# Patient Record
Sex: FEMALE | Race: WHITE | NOT HISPANIC OR LATINO | Employment: FULL TIME | URBAN - METROPOLITAN AREA
[De-identification: names, ages, dates, MRNs, and addresses within clinical notes are randomized per-mention and may not be internally consistent; named-entity substitution may affect disease eponyms.]

---

## 2017-01-17 ENCOUNTER — PRENATAL OFFICE VISIT (OUTPATIENT)
Dept: MIDWIFE SERVICES | Facility: CLINIC | Age: 37
End: 2017-01-17
Payer: COMMERCIAL

## 2017-01-17 VITALS
HEIGHT: 67 IN | BODY MASS INDEX: 31.12 KG/M2 | DIASTOLIC BLOOD PRESSURE: 74 MMHG | TEMPERATURE: 96.7 F | WEIGHT: 198.3 LBS | HEART RATE: 101 BPM | SYSTOLIC BLOOD PRESSURE: 122 MMHG

## 2017-01-17 DIAGNOSIS — O09.521 ELDERLY MULTIGRAVIDA IN FIRST TRIMESTER: Primary | ICD-10-CM

## 2017-01-17 LAB
GLUCOSE 1H P 50 G GLC PO SERPL-MCNC: 140 MG/DL (ref 60–129)
HGB BLD-MCNC: 12.1 G/DL (ref 11.7–15.7)

## 2017-01-17 PROCEDURE — 82950 GLUCOSE TEST: CPT | Performed by: ADVANCED PRACTICE MIDWIFE

## 2017-01-17 PROCEDURE — 99207 ZZC PRENATAL VISIT: CPT | Performed by: ADVANCED PRACTICE MIDWIFE

## 2017-01-17 PROCEDURE — 00000218 ZZHCL STATISTIC OBHBG - HEMOGLOBIN: Performed by: ADVANCED PRACTICE MIDWIFE

## 2017-01-17 PROCEDURE — 36415 COLL VENOUS BLD VENIPUNCTURE: CPT | Performed by: ADVANCED PRACTICE MIDWIFE

## 2017-01-17 PROCEDURE — 86780 TREPONEMA PALLIDUM: CPT | Performed by: ADVANCED PRACTICE MIDWIFE

## 2017-01-17 NOTE — NURSING NOTE
Syphilis is a sexually transmitted disease that can cause birth defects in the babies of untreated mothers. Every pregnant patient is tested for syphilis early in each pregnancy as part of the routine lab work. The [place] Department of [facility] has seen an increase in the rate of syphilis in [place]. The [facility] now recommends testing for syphilis 3 times during a pregnancy, the new prenatal visit, 28 weeks and when admitted for delivery. Patient accepts lab testing for syphilis.

## 2017-01-17 NOTE — PROGRESS NOTES
Feeling well, no concerns. Many questions answered today about CBE classes, TDAP, moles (will make appointment with FP clinic in [place]) and medication ok to take during pregnancy. Given handout on medication and 24 week handout with CBE options. Feeling baby move. No contractions, LOF or bleeding. GCT, hgb today. RTC in 4 weeks. MML

## 2017-01-18 LAB — T PALLIDUM IGG+IGM SER QL: NEGATIVE

## 2017-01-18 ASSESSMENT — PATIENT HEALTH QUESTIONNAIRE - PHQ9: SUM OF ALL RESPONSES TO PHQ QUESTIONS 1-9: 2

## 2017-01-20 DIAGNOSIS — O09.521 ELDERLY MULTIGRAVIDA IN FIRST TRIMESTER: Primary | ICD-10-CM

## 2017-01-20 PROCEDURE — 36415 COLL VENOUS BLD VENIPUNCTURE: CPT | Performed by: ADVANCED PRACTICE MIDWIFE

## 2017-01-20 PROCEDURE — 82952 GTT-ADDED SAMPLES: CPT | Performed by: ADVANCED PRACTICE MIDWIFE

## 2017-01-20 PROCEDURE — 82951 GLUCOSE TOLERANCE TEST (GTT): CPT | Performed by: ADVANCED PRACTICE MIDWIFE

## 2017-01-21 LAB
GLUCOSE 1H P 100 G GLC PO SERPL-MCNC: 151 MG/DL (ref 60–179)
GLUCOSE 2H P 100 G GLC PO SERPL-MCNC: 130 MG/DL (ref 60–154)
GLUCOSE 3H P 100 G GLC PO SERPL-MCNC: 80 MG/DL (ref 60–139)
GLUCOSE P FAST SERPL-MCNC: 72 MG/DL (ref 60–94)

## 2017-02-03 ENCOUNTER — OFFICE VISIT (OUTPATIENT)
Dept: FAMILY MEDICINE | Facility: CLINIC | Age: 37
End: 2017-02-03
Payer: COMMERCIAL

## 2017-02-03 DIAGNOSIS — D22.9 MULTIPLE BENIGN MELANOCYTIC NEVI: ICD-10-CM

## 2017-02-03 DIAGNOSIS — Z80.8 FAMILY HISTORY OF MALIGNANT MELANOMA OF SKIN: ICD-10-CM

## 2017-02-03 DIAGNOSIS — L91.8 SKIN TAG: ICD-10-CM

## 2017-02-03 DIAGNOSIS — Z80.8 FAMILY HISTORY OF BASAL CELL CARCINOMA: ICD-10-CM

## 2017-02-03 DIAGNOSIS — D22.5 COMPOUND NEVUS OF CHEST: Primary | ICD-10-CM

## 2017-02-03 PROCEDURE — 99213 OFFICE O/P EST LOW 20 MIN: CPT | Performed by: FAMILY MEDICINE

## 2017-02-03 NOTE — PROGRESS NOTES
"[facility] - PRIMARY CARE SKIN    CC : Lesion(s)  SUBJECTIVE:                                                    Sakshi Parish is a 36 year old female who presents to clinic today because of mole changes on the trunk. Moles have changed during pregnancy, currently 26 weeks pregnant. Pregnancy has been otherwise uneventful. No bleeding of moles. Slight itchiness of one mole, but she reports itchiness whenever a new mole develops.    Mole on right chest had a \"black, charcoal-stephen, burnt spot\", but the spot has now fallen off.    Mole on right lower chest has developed a raised knob from a flat lesion previously.    Personal history of skin cancer : NO.  Family history of skin cancer : YES - melanoma in mother and basal cell carcinoma in maternal uncle.    Occupation : (indoor).    Refer to electronic medical record (EMR) for past medical history and medications.    INTEGUMENTARY/SKIN: POSITIVE for mole changes  ROS : 14 point review of systems was negative except the symptoms listed above in the HPI.    This document serves as a record of the services and decisions personally performed and made by Jessica Meeks MD. It was created on her behalf by Richy Gillespie, a trained medical scribe.  The creation of this document is based on the scribe's personal observations and the provider's statements to the medical scribe.  Richy Gillespie, February 3, 2017 3:17 PM      OBJECTIVE:                                                    GENERAL: healthy, alert and no distress  SKIN: Mendez Skin Type - I.  Face, Neck and Trunk were examined. The dermatoscope was used to help evaluate pigmented lesions.  Skin Pertinent Findings:  Right lateral chest wall : 6 mm x 3 mm in size, raised, smooth, brown lesion consistent with benign compound nevus. Dermoscopy - no suspicious characteristics.    Right lower chest wall : 3 mm in size, raised, brown, benign-appearing skin tag.    Back : 5 mm benign brown macule on the left lateral " "back    Diagnostic Test Results:  none           ASSESSMENT:                                                      Encounter Diagnoses   Name Primary?     Compound nevus of chest Yes     Multiple benign melanocytic nevi      Skin tag      Family history of malignant melanoma of skin      Family history of basal cell carcinoma            PLAN:                                                    Patient Instructions   FUTURE APPOINTMENTS  Follow up for an annual full-body skin cancer screening.    TIPS FOR AVOIDING SKIN CANCER AND PREMATURE SKIN AGING  DOs    Wear a wide-brimmed hat and sunglasses.     Wear sun-protective clothing.    [employer] and other [employer] make sun protective clothing that is stylish, comfortable and cool.    [employer] and other [employer] make UV arm sleeves suitable for golfing, gardening and other activities.      Wear sunscreen on your face every day, even in the winter. (UVA \"aging rays\" penetrates window glass and is just as strong in the winter as in the summer) Sunscreen with SPF > 30 is recommended.    Wear sunscreen on your body and re-apply every 2 hours when exposed to sun. Sunscreen with SPF > 50 is recommended.    You should use about 3 tablespoons of sunscreen to protect your whole body. Thus a typical eight ounce bottle of sunscreen should last 4 applications. Remember, that the SPF rating is compromised if you don t apply enough. Most people only apply 1/2 - 1/3 of the amount they need. Also don t forget areas such as your ears, feet, upper back and harder to reach places. Keep in mind that these amounts should be increased for larger body sizes.    Note that spray sunscreens are only for touch-up application, not as a base layer. Also, use spray sunscreens with caution around small children due to inhalation risk.    Product Recommendations:    Look for broad spectrum sunscreen (blocks both UVA and UVB).    Look for titanium dioxide and/or zinc oxide in the active " "ingredients, which are physical blockers as opposed to chemical blockers. Chemical-free sunscreens should not irritate the skin.    Good examples include: Blue Lizard, EltaMD, Vanicream, Solbar, CeraVe.     For sensitive skin, consider : SkinMedica Essential Defense Mineral Shield Broad Spectrum SPF 35  (You can also find these on amazon.com)      Avoid combination products that include both sunscreen and insect repellant, as sunscreen should be applied every 2 hours, but insect repellant should not be applied as frequently.    Avoid products that include oxybenzone or retinyl palmitate.    For more information:  http://www.skincancer.org/prevention/sun-protection/sunscreen/sunscreens-safe-and-effective    DON'Ts    All tanning damages the skin. Aim for ivory skin year round and you will have less trouble with your skin in years to come. There is no merit in getting \"a base tan\" before a warm weather vacation, as any tanning indicates your body's response to sun damage.    Never use tanning beds. Tanning beds are associated with much higher risks of skin cancer.    Avoid mid-day sunshine (10 AM to 3 PM), if possible.    Stop smoking. Smokers have higher rates of skin cancer and also have premature skin wrinkling.          PROCEDURES:                                                    None.    TT : 20 minutes.  CT : 15 minutes.      The information in this document, created by the medical scribe for me, accurately reflects the services I personally performed and the decisions made by me. I have reviewed and approved this document for accuracy prior to leaving the patient care area.  Jessica Meeks MD February 3, 2017 3:17 PM  [facility] - PRIMARY CARE SKIN  "

## 2017-02-03 NOTE — MR AVS SNAPSHOT
"              After Visit Summary   2/3/2017    Sakshi Parish    MRN: [id number]           Patient Information     Date Of Birth          1980        Visit Information        Provider Department      2/3/2017 3:20 PM Ledy Meeks MD [facility] - Primary Care Skin        Today's Diagnoses     Compound nevus of chest    -  1     Multiple benign melanocytic nevi         Skin tag         Family history of malignant melanoma of skin         Family history of basal cell carcinoma           Care Instructions    FUTURE APPOINTMENTS  Follow up for an annual full-body skin cancer screening.    TIPS FOR AVOIDING SKIN CANCER AND PREMATURE SKIN AGING  DOs    Wear a wide-brimmed hat and sunglasses.     Wear sun-protective clothing.    "[employer]" and other companies make sun protective clothing that is stylish, comfortable and cool.    [employer] and other companies make UV arm sleeves suitable for golfing, gardening and other activities.      Wear sunscreen on your face every day, even in the winter. (UVA \"aging rays\" penetrates window glass and is just as strong in the winter as in the summer) Sunscreen with SPF > 30 is recommended.    Wear sunscreen on your body and re-apply every 2 hours when exposed to sun. Sunscreen with SPF > 50 is recommended.    You should use about 3 tablespoons of sunscreen to protect your whole body. Thus a typical eight ounce bottle of sunscreen should last 4 applications. Remember, that the SPF rating is compromised if you don t apply enough. Most people only apply 1/2 - 1/3 of the amount they need. Also don t forget areas such as your ears, feet, upper back and harder to reach places. Keep in mind that these amounts should be increased for larger body sizes.    Note that spray sunscreens are only for touch-up application, not as a base layer. Also, use spray sunscreens with caution around small children due to inhalation risk.    Product " "Recommendations:    Look for broad spectrum sunscreen (blocks both UVA and UVB).    Look for titanium dioxide and/or zinc oxide in the active ingredients, which are physical blockers as opposed to chemical blockers. Chemical-free sunscreens should not irritate the skin.    Good examples include: Blue Lizard, EltaMD, Vanicream, Solbar, CeraVe.     For sensitive skin, consider : SkinMedica Essential Defense Mineral Shield Broad Spectrum SPF 35  (You can also find these on amazon.com)      Avoid combination products that include both sunscreen and insect repellant, as sunscreen should be applied every 2 hours, but insect repellant should not be applied as frequently.    Avoid products that include oxybenzone or retinyl palmitate.    For more information:  http://www.skincancer.org/prevention/sun-protection/sunscreen/sunscreens-safe-and-effective    DON'Ts    All tanning damages the skin. Aim for ivory skin year round and you will have less trouble with your skin in years to come. There is no merit in getting \"a base tan\" before a warm weather vacation, as any tanning indicates your body's response to sun damage.    Never use tanning beds. Tanning beds are associated with much higher risks of skin cancer.    Avoid mid-day sunshine (10 AM to 3 PM), if possible.    Stop smoking. Smokers have higher rates of skin cancer and also have premature skin wrinkling.        Follow-ups after your visit        Your next 10 appointments already scheduled     Feb 23, 2017  7:30 AM   Esteban Rucker with YUMIKO Davalos CNM   [facility] ([facility]    [address] [place] [id number] [phone]              Who to contact     If you have questions or need follow up information about today's clinic visit or your schedule please contact [facility] - PRIMARY CARE SKIN directly at [phone].  Normal or non-critical lab and imaging results will be communicated " to you by Zifyhart, letter or phone within 4 business days after the clinic has received the results. If you do not hear from us within 7 days, please contact the clinic through LapSpace or phone. If you have a critical or abnormal lab result, we will notify you by phone as soon as possible.  Submit refill requests through LapSpace or call your pharmacy and they will forward the refill request to us. Please allow 3 business days for your refill to be completed.          Additional Information About Your Visit        [facility] Information     LapSpace gives you secure access to your electronic health record. If you see a primary care provider, you can also send messages to your care team and make appointments. If you have questions, please call your primary care clinic.  If you do not have a primary care provider, please call [phone] and they will assist you.        Care EveryWhere ID     This is your Care EveryWhere ID. This could be used by other organizations to access your [place] medical records  [id number]        Your Vitals Were     Last Period                   07/28/2016 (Approximate)            Blood Pressure from Last 3 Encounters:   01/17/17 122/74   12/01/16 136/81   09/27/16 128/80    Weight from Last 3 Encounters:   01/17/17 198 lb 4.8 oz (89.948 kg)   12/01/16 193 lb (87.544 kg)   09/27/16 192 lb 1.6 oz (87.136 kg)              Today, you had the following     No orders found for display       Primary Care Provider Office Phone #    [place] [facility] [phone]       No address on file        Thank you!     Thank you for choosing [facility] PRIMARY CARE [facility]  for your care. Our goal is always to provide you with excellent care. Hearing back from our patients is one way we can continue to improve our services. Please take a few minutes to complete the written survey that you may receive in the mail after your visit with us. Thank you!             Your Updated Medication List - Protect  others around you: Learn how to safely use, store and throw away your medicines at www.disposemymeds.org.          This list is accurate as of: 2/3/17  3:27 PM.  Always use your most recent med list.                   Brand Name Dispense Instructions for use    fish oil-omega-3 fatty acids 1000 MG capsule      Take 2 g by mouth daily       fluticasone 50 MCG/ACT spray    FLONASE    1 Package    Spray 1-2 sprays into both nostrils daily       Multi-vitamin Tabs tablet      Take 1 tablet by mouth daily

## 2017-02-03 NOTE — PATIENT INSTRUCTIONS
"FUTURE APPOINTMENTS  Follow up for an annual full-body skin cancer screening.    TIPS FOR AVOIDING SKIN CANCER AND PREMATURE SKIN AGING  DOs    Wear a wide-brimmed hat and sunglasses.     Wear sun-protective clothing.    [employer] and other [employer] make sun protective clothing that is stylish, comfortable and cool.    [employer] and other [employer] make UV arm sleeves suitable for golfing, gardening and other activities.      Wear sunscreen on your face every day, even in the winter. (UVA \"aging rays\" penetrates window glass and is just as strong in the winter as in the summer) Sunscreen with SPF > 30 is recommended.    Wear sunscreen on your body and re-apply every 2 hours when exposed to sun. Sunscreen with SPF > 50 is recommended.    You should use about 3 tablespoons of sunscreen to protect your whole body. Thus a typical eight ounce bottle of sunscreen should last 4 applications. Remember, that the SPF rating is compromised if you don t apply enough. Most people only apply 1/2 - 1/3 of the amount they need. Also don t forget areas such as your ears, feet, upper back and harder to reach places. Keep in mind that these amounts should be increased for larger body sizes.    Note that spray sunscreens are only for touch-up application, not as a base layer. Also, use spray sunscreens with caution around small children due to inhalation risk.    Product Recommendations:    Look for broad spectrum sunscreen (blocks both UVA and UVB).    Look for titanium dioxide and/or zinc oxide in the active ingredients, which are physical blockers as opposed to chemical blockers. Chemical-free sunscreens should not irritate the skin.    Good examples include: Blue Lizard, EltaMD, Vanicream, Solbar, CeraVe.     For sensitive skin, consider : SkinMedica Essential Defense Mineral Shield Broad Spectrum SPF 35  (You can also find these on amazon.com)      Avoid combination products that include both sunscreen and insect " "repellant, as sunscreen should be applied every 2 hours, but insect repellant should not be applied as frequently.    Avoid products that include oxybenzone or retinyl palmitate.    For more information:  http://www.skincancer.org/prevention/sun-protection/sunscreen/sunscreens-safe-and-effective    DON'Ts    All tanning damages the skin. Aim for ivory skin year round and you will have less trouble with your skin in years to come. There is no merit in getting \"a base tan\" before a warm weather vacation, as any tanning indicates your body's response to sun damage.    Never use tanning beds. Tanning beds are associated with much higher risks of skin cancer.    Avoid mid-day sunshine (10 AM to 3 PM), if possible.    Stop smoking. Smokers have higher rates of skin cancer and also have premature skin wrinkling.  "

## 2017-02-23 ENCOUNTER — PRENATAL OFFICE VISIT (OUTPATIENT)
Dept: MIDWIFE SERVICES | Facility: CLINIC | Age: 37
End: 2017-02-23
Payer: COMMERCIAL

## 2017-02-23 VITALS
SYSTOLIC BLOOD PRESSURE: 102 MMHG | WEIGHT: 198.9 LBS | HEART RATE: 91 BPM | BODY MASS INDEX: 31.15 KG/M2 | TEMPERATURE: 97.1 F | OXYGEN SATURATION: 97 % | DIASTOLIC BLOOD PRESSURE: 60 MMHG

## 2017-02-23 DIAGNOSIS — Z23 NEED FOR TDAP VACCINATION: ICD-10-CM

## 2017-02-23 DIAGNOSIS — O99.891 BACK PAIN IN PREGNANCY: ICD-10-CM

## 2017-02-23 DIAGNOSIS — O09.521 ELDERLY MULTIGRAVIDA IN FIRST TRIMESTER: Primary | ICD-10-CM

## 2017-02-23 DIAGNOSIS — M54.9 BACK PAIN IN PREGNANCY: ICD-10-CM

## 2017-02-23 PROCEDURE — 90471 IMMUNIZATION ADMIN: CPT | Performed by: ADVANCED PRACTICE MIDWIFE

## 2017-02-23 PROCEDURE — 99207 ZZC PRENATAL VISIT: CPT | Performed by: ADVANCED PRACTICE MIDWIFE

## 2017-02-23 PROCEDURE — 90715 TDAP VACCINE 7 YRS/> IM: CPT | Performed by: ADVANCED PRACTICE MIDWIFE

## 2017-02-23 NOTE — PROGRESS NOTES
Sakshi is a 36 year old  @ 30+0 who presents for a scheduled  appointment.  States that she is feeling well.  Good fetal movement.  Denies vaginal leaking, bleeding, or discharge.  No uterine contractions.  FHR 130s.  FH 30cm.  She is feeling some low/mid back pain that she characterizes as muscular.  Discussed comfort measures and provided rx for a maternity support belt.  Is excited about her pregnancy and has necessary baby care items.  Planning on breastfeeding.  TDAP given today.  Will return to clinic in two weeks for next appointment or sooner if needed.     SHANICE García, RN, SNM

## 2017-02-23 NOTE — PROGRESS NOTES
Pt here with . Feeling well today - has noticed increasing low/mid back pain. Discussed etiology and comfort measures, rx given for support belt. Baby is active. No contractions, LOF or bleeding. TDAP today. RTC in 2 weeks. MML

## 2017-02-23 NOTE — MR AVS SNAPSHOT
After Visit Summary   2/23/2017    Sakshi Parish    MRN: [id number]           Patient Information     Date Of Birth          1980        Visit Information        Provider Department      2/23/2017 7:30 AM Neris Prado APRN CNM [facility]        Today's Diagnoses     Elderly multigravida in first trimester    -  1    Need for Tdap vaccination        Back pain in pregnancy           Follow-ups after your visit        Your next 10 appointments already scheduled     Mar 10, 2017  3:30 PM CST   Esteban OB Short with YUMIKO Gill CNM   [facility] ([facility])    [address] [place] [id number] [phone]              Who to contact     If you have questions or need follow up information about today's clinic visit or your schedule please contact [facility] directly at [phone].  Normal or non-critical lab and imaging results will be communicated to you by PeerApphart, letter or phone within 4 business days after the clinic has received the results. If you do not hear from us within 7 days, please contact the clinic through PeerApphart or phone. If you have a critical or abnormal lab result, we will notify you by phone as soon as possible.  Submit refill requests through Zooomr or call your pharmacy and they will forward the refill request to us. Please allow 3 business days for your refill to be completed.          Additional Information About Your Visit        MyChart Information     Zooomr gives you secure access to your electronic health record. If you see a primary care provider, you can also send messages to your care team and make appointments. If you have questions, please call your primary care clinic.  If you do not have a primary care provider, please call [phone] and they will assist you.        Care EveryWhere ID     This is your Care EveryWhere ID. This could be used by  other organizations to access your [place] medical records  [id number]        Your Vitals Were     Pulse Temperature Last Period Pulse Oximetry BMI (Body Mass Index)       91 97.1  F (36.2  C) (Oral) 07/28/2016 (Approximate) 97% 31.15 kg/m2        Blood Pressure from Last 3 Encounters:   02/23/17 102/60   01/17/17 122/74   12/01/16 136/81    Weight from Last 3 Encounters:   02/23/17 198 lb 14.4 oz (90.2 kg)   01/17/17 198 lb 4.8 oz (89.9 kg)   12/01/16 193 lb (87.5 kg)              We Performed the Following     ADMIN 1st VACCINE     TDAP (ADACEL AGES 11-64)          Today's Medication Changes          These changes are accurate as of: 2/23/17  9:53 AM.  If you have any questions, ask your nurse or doctor.               Start taking these medicines.        Dose/Directions    order for DME   Used for:  Back pain in pregnancy   Started by:  Neris Prado APRN CNM        Equipment being ordered: Maternity support belt   Quantity:  1 Device   Refills:  0            Where to get your medicines      Some of these will need a paper prescription and others can be bought over the counter.  Ask your nurse if you have questions.     Bring a paper prescription for each of these medications     order for DME                Primary Care Provider Office Phone #    [facility] [phone]       No address on file        Thank you!     Thank you for choosing [facility]  for your care. Our goal is always to provide you with excellent care. Hearing back from our patients is one way we can continue to improve our services. Please take a few minutes to complete the written survey that you may receive in the mail after your visit with us. Thank you!             Your Updated Medication List - Protect others around you: Learn how to safely use, store and throw away your medicines at www.disposemymeds.org.          This list is accurate as of: 2/23/17  9:53 AM.  Always use your most recent med list.                    Brand Name Dispense Instructions for use    fish oil-omega-3 fatty acids 1000 MG capsule      Take 2 g by mouth daily       fluticasone 50 MCG/ACT spray    FLONASE    1 Package    Spray 1-2 sprays into both nostrils daily       Multi-vitamin Tabs tablet      Take 1 tablet by mouth daily       order for DME     1 Device    Equipment being ordered: Maternity support belt

## 2017-03-06 ENCOUNTER — TELEPHONE (OUTPATIENT)
Dept: FAMILY MEDICINE | Facility: CLINIC | Age: 37
End: 2017-03-06

## 2017-03-06 NOTE — TELEPHONE ENCOUNTER
Summary:    Patient is due/failing the following:   PAP    Type of outreach:  Sent letter.  Action needed: Patient needs office visit for pap smear.    If need for provider review:    Please indicate OV, lab, MTM, or nurse appt if needed.  Indicate fasting or not fasting.                                                                                                                                          Starla Allan MA  [facility]

## 2017-03-10 ENCOUNTER — PRENATAL OFFICE VISIT (OUTPATIENT)
Dept: MIDWIFE SERVICES | Facility: CLINIC | Age: 37
End: 2017-03-10
Payer: COMMERCIAL

## 2017-03-10 VITALS
TEMPERATURE: 97.6 F | HEART RATE: 87 BPM | DIASTOLIC BLOOD PRESSURE: 75 MMHG | SYSTOLIC BLOOD PRESSURE: 123 MMHG | OXYGEN SATURATION: 97 % | WEIGHT: 200.8 LBS | HEIGHT: 67 IN | BODY MASS INDEX: 31.52 KG/M2

## 2017-03-10 DIAGNOSIS — G44.219 EPISODIC TENSION-TYPE HEADACHE, NOT INTRACTABLE: Primary | ICD-10-CM

## 2017-03-10 PROCEDURE — 99207 ZZC PRENATAL VISIT: CPT | Performed by: ADVANCED PRACTICE MIDWIFE

## 2017-03-10 RX ORDER — CYCLOBENZAPRINE HCL 10 MG
10 TABLET ORAL 3 TIMES DAILY PRN
Qty: 30 TABLET | Refills: 1 | Status: SHIPPED | OUTPATIENT
Start: 2017-03-10 | End: 2017-03-10

## 2017-03-10 NOTE — MR AVS SNAPSHOT
"              After Visit Summary   3/10/2017    Sakshi Parish    MRN: [id number]           Patient Information     Date Of Birth          1980        Visit Information        Provider Department      3/10/2017 3:30 PM Capri Vazquez APRN CNM [facility] [place]        Today's Diagnoses     Episodic tension-type headache, not intractable    -  1       Follow-ups after your visit        Who to contact     If you have questions or need follow up information about today's clinic visit or your schedule please contact [facility] directly at [phone].  Normal or non-critical lab and imaging results will be communicated to you by iTwixiehart, letter or phone within 4 business days after the clinic has received the results. If you do not hear from us within 7 days, please contact the clinic through EUDOWEBt or phone. If you have a critical or abnormal lab result, we will notify you by phone as soon as possible.  Submit refill requests through [employer] or call your pharmacy and they will forward the refill request to us. Please allow 3 business days for your refill to be completed.          Additional Information About Your Visit        MyChart Information     [employer] gives you secure access to your electronic health record. If you see a primary care provider, you can also send messages to your care team and make appointments. If you have questions, please call your primary care clinic.  If you do not have a primary care provider, please call [phone] and they will assist you.        Care EveryWhere ID     This is your Care EveryWhere ID. This could be used by other organizations to access your [place] medical records  [id number]        Your Vitals Were     Pulse Temperature Height Last Period Pulse Oximetry BMI (Body Mass Index)    87 97.6  F (36.4  C) (Oral) 5' 7\" (1.702 m) 07/28/2016 (Approximate) 97% 31.45 kg/m2       Blood Pressure from Last 3 Encounters:   03/10/17 123/75 "   02/23/17 102/60   01/17/17 122/74    Weight from Last 3 Encounters:   03/10/17 200 lb 12.8 oz (91.1 kg)   02/23/17 198 lb 14.4 oz (90.2 kg)   01/17/17 198 lb 4.8 oz (89.9 kg)              Today, you had the following     No orders found for display         Today's Medication Changes          These changes are accurate as of: 3/10/17  3:53 PM.  If you have any questions, ask your nurse or doctor.               Stop taking these medicines if you haven't already. Please contact your care team if you have questions.     Multi-vitamin Tabs tablet   Stopped by:  Capri Vazquez APRN CNM                    Primary Care Provider Office Phone #    [facility] [phone]       No address on file        Thank you!     Thank you for [place] [facility] [place]. Our goal is always to provide you with excellent care. Hearing back from our patients is one way we can continue to improve our services. Please take a few minutes to complete the written survey that you may receive in the mail after your visit with us. Thank you!             Your Updated Medication List - Protect others around you: Learn how to safely use, store and throw away your medicines at www.disposemymeds.org.          This list is accurate as of: 3/10/17  3:53 PM.  Always use your most recent med list.                   Brand Name Dispense Instructions for use    fish oil-omega-3 fatty acids 1000 MG capsule      Take 2 g by mouth daily       fluticasone 50 MCG/ACT spray    FLONASE    1 Package    Spray 1-2 sprays into both nostrils daily       order for DME     1 Device    Equipment being ordered: Maternity support belt       PRENATAL VITAMIN PO

## 2017-03-10 NOTE — PROGRESS NOTES
Feeling well, no c/o.  Has not gotten pregnancy support belt but plans to this week, still has some back pain.  TUMS seems to be managing her heartburn.  RTC 2 wks JR

## 2017-03-31 ENCOUNTER — PRENATAL OFFICE VISIT (OUTPATIENT)
Dept: MIDWIFE SERVICES | Facility: CLINIC | Age: 37
End: 2017-03-31
Payer: COMMERCIAL

## 2017-03-31 VITALS
HEART RATE: 102 BPM | TEMPERATURE: 97.6 F | DIASTOLIC BLOOD PRESSURE: 78 MMHG | WEIGHT: 197 LBS | SYSTOLIC BLOOD PRESSURE: 121 MMHG | BODY MASS INDEX: 30.85 KG/M2

## 2017-03-31 DIAGNOSIS — Z34.93 PRENATAL CARE IN THIRD TRIMESTER: Primary | ICD-10-CM

## 2017-03-31 PROCEDURE — 99207 ZZC PRENATAL VISIT: CPT | Performed by: ADVANCED PRACTICE MIDWIFE

## 2017-03-31 NOTE — PROGRESS NOTES
Feeling well.  Baby is active. Denies any leaking of fluid, vaginal bleeding, regular uterine contractions, or headaches or other concerns.  Discussed birth control - they will think about it.  They are not sure what they want.  Plan on breastfeeding.  Discussed support at the hospital.  Discussed GBS at next visit.    Reviewed to call [phone] for contractions, loss of fluid, vaginal bleeding, decreased fetal movement or any other questions or concerns.    RTC in 1 weeks.  Nikky Pereira, JEFF, APRN, CNM

## 2017-03-31 NOTE — MR AVS SNAPSHOT
After Visit Summary   3/31/2017    Sakshi Parish    MRN: [id number]           Patient Information     Date Of Birth          1980        Visit Information        Provider Department      3/31/2017 1:00 PM Nikky Pereira CNM [facility]        Today's Diagnoses     Prenatal care in third trimester    -  1       Follow-ups after your visit        Follow-up notes from your care team     Return in about 1 week (around 4/7/2017) for Prenatal care with GARY.      Who to contact     If you have questions or need follow up information about today's clinic visit or your schedule please contact [facility] directly at [phone].  Normal or non-critical lab and imaging results will be communicated to you by MyChart, letter or phone within 4 business days after the clinic has received the results. If you do not hear from us within 7 days, please contact the clinic through [employer] or phone. If you have a critical or abnormal lab result, we will notify you by phone as soon as possible.  Submit refill requests through [employer] or call your pharmacy and they will forward the refill request to us. Please allow 3 business days for your refill to be completed.          Additional Information About Your Visit        MyChart Information     [employer] gives you secure access to your electronic health record. If you see a primary care provider, you can also send messages to your care team and make appointments. If you have questions, please call your primary care clinic.  If you do not have a primary care provider, please call [phone] and they will assist you.        Care EveryWhere ID     This is your Care EveryWhere ID. This could be used by other organizations to access your [place] medical records  [id number]        Your Vitals Were     Pulse Temperature Last Period BMI (Body Mass Index)          102 97.6  F (36.4  C) (Oral) 07/28/2016 (Approximate) 30.85 kg/m2          Blood Pressure from Last 3 Encounters:   03/31/17 121/78   03/10/17 123/75   02/23/17 102/60    Weight from Last 3 Encounters:   03/31/17 197 lb (89.4 kg)   03/10/17 200 lb 12.8 oz (91.1 kg)   02/23/17 198 lb 14.4 oz (90.2 kg)              Today, you had the following     No orders found for display       Primary Care Provider Office Phone #    [facility] [phone]       No address on file        Thank you!     Thank you for choosing [facility]  for your care. Our goal is always to provide you with excellent care. Hearing back from our patients is one way we can continue to improve our services. Please take a few minutes to complete the written survey that you may receive in the mail after your visit with us. Thank you!             Your Updated Medication List - Protect others around you: Learn how to safely use, store and throw away your medicines at www.disposemymeds.org.          This list is accurate as of: 3/31/17  1:49 PM.  Always use your most recent med list.                   Brand Name Dispense Instructions for use    fish oil-omega-3 fatty acids 1000 MG capsule      Take 2 g by mouth daily       fluticasone 50 MCG/ACT spray    FLONASE    1 Package    Spray 1-2 sprays into both nostrils daily       order for DME     1 Device    Equipment being ordered: Maternity support belt       PRENATAL VITAMIN PO

## 2017-04-06 ENCOUNTER — PRENATAL OFFICE VISIT (OUTPATIENT)
Dept: MIDWIFE SERVICES | Facility: CLINIC | Age: 37
End: 2017-04-06
Payer: COMMERCIAL

## 2017-04-06 VITALS
HEIGHT: 67 IN | BODY MASS INDEX: 31.53 KG/M2 | HEART RATE: 120 BPM | DIASTOLIC BLOOD PRESSURE: 61 MMHG | WEIGHT: 200.9 LBS | SYSTOLIC BLOOD PRESSURE: 124 MMHG | TEMPERATURE: 98.2 F

## 2017-04-06 DIAGNOSIS — O09.521 ELDERLY MULTIGRAVIDA IN FIRST TRIMESTER: Primary | ICD-10-CM

## 2017-04-06 LAB — HGB BLD-MCNC: 13 G/DL (ref 11.7–15.7)

## 2017-04-06 PROCEDURE — 36416 COLLJ CAPILLARY BLOOD SPEC: CPT | Performed by: ADVANCED PRACTICE MIDWIFE

## 2017-04-06 PROCEDURE — 99207 ZZC PRENATAL VISIT: CPT | Performed by: ADVANCED PRACTICE MIDWIFE

## 2017-04-06 PROCEDURE — 86803 HEPATITIS C AB TEST: CPT | Performed by: ADVANCED PRACTICE MIDWIFE

## 2017-04-06 PROCEDURE — 87653 STREP B DNA AMP PROBE: CPT | Performed by: ADVANCED PRACTICE MIDWIFE

## 2017-04-06 PROCEDURE — 00000218 ZZHCL STATISTIC OBHBG - HEMOGLOBIN: Performed by: ADVANCED PRACTICE MIDWIFE

## 2017-04-06 NOTE — MR AVS SNAPSHOT
After Visit Summary   4/6/2017    Sakshi Parish    MRN: [id number]           Patient Information     Date Of Birth          1980        Visit Information        Provider Department      4/6/2017 3:30 PM Neris Prado APRN [place] [facility]        Today's Diagnoses     Elderly multigravida in first trimester    -  1       Follow-ups after your visit        Your next 10 appointments already scheduled     Apr 12, 2017  4:30 PM CDT   MyChart OB Short with YUMIKO Mckeon CNM   [facility] [place]    [address] [place] [id number] [phone]            Apr 21, 2017  3:30 PM CDT   MyChart OB Short with YUMIKO Young CNM   [facility] [place]    [address] [place] [id number] [phone]              Who to contact     If you have questions or need follow up information about today's clinic visit or your schedule please contact [facility] directly at [phone].  Normal or non-critical lab and imaging results will be communicated to you by MyChart, letter or phone within 4 business days after the clinic has received the results. If you do not hear from us within 7 days, please contact the clinic through MyChart or phone. If you have a critical or abnormal lab result, we will notify you by phone as soon as possible.  Submit refill requests through [employer] or call your pharmacy and they will forward the refill request to us. Please allow 3 business days for your refill to be completed.          Additional Information About Your Visit        MyChart Information     [employer] gives you secure access to your electronic health record. If you see a primary care provider, you can also send messages to your care team and make appointments. If you have questions, please call your primary care clinic.  If you do not  "have a primary care provider, please call [phone] and they will assist you.        Care EveryWhere ID     This is your Care EveryWhere ID. This could be used by other organizations to access your [place] medical records  [id number]        Your Vitals Were     Pulse Temperature Height Last Period BMI (Body Mass Index)       120 98.2  F (36.8  C) (Oral) 5' 7\" (1.702 m) 07/28/2016 (Approximate) 31.47 kg/m2        Blood Pressure from Last 3 Encounters:   04/06/17 124/61   03/31/17 121/78   03/10/17 123/75    Weight from Last 3 Encounters:   04/06/17 200 lb 14.4 oz (91.1 kg)   03/31/17 197 lb (89.4 kg)   03/10/17 200 lb 12.8 oz (91.1 kg)              We Performed the Following     Group B strep PCR     Hepatitis C antibody     OB hemoglobin        Primary Care Provider Office Phone #    [facility] [phone]       No address on file        Thank you!     Thank you for choosing [facility]  for your care. Our goal is always to provide you with excellent care. Hearing back from our patients is one way we can continue to improve our services. Please take a few minutes to complete the written survey that you may receive in the mail after your visit with us. Thank you!             Your Updated Medication List - Protect others around you: Learn how to safely use, store and throw away your medicines at www.disposemymeds.org.          This list is accurate as of: 4/6/17  4:22 PM.  Always use your most recent med list.                   Brand Name Dispense Instructions for use    fish oil-omega-3 fatty acids 1000 MG capsule      Take 2 g by mouth daily       fluticasone 50 MCG/ACT spray    FLONASE    1 Package    Spray 1-2 sprays into both nostrils daily       order for DME     1 Device    Equipment being ordered: Maternity support belt       PRENATAL VITAMIN PO            "

## 2017-04-06 NOTE — PROGRESS NOTES
Feeling well, no concerns. Baby is active. No regular contractions, LOF or bleeding. Would like water birth as an option - will draw Hep C today. Please sign agreement at next visit. Looking into peds clinics, planning to breastfeed. Reviewed progesterone only birth control options - patient will consider. GBS, hgb and Hep C today. RTC weekly, already scheduled. MML

## 2017-04-07 ASSESSMENT — PATIENT HEALTH QUESTIONNAIRE - PHQ9: SUM OF ALL RESPONSES TO PHQ QUESTIONS 1-9: 2

## 2017-04-08 LAB
GP B STREP DNA SPEC QL NAA+PROBE: NORMAL
SPECIMEN SOURCE: NORMAL

## 2017-04-09 LAB — HCV AB SERPL QL IA: NORMAL

## 2017-04-12 ENCOUNTER — PRENATAL OFFICE VISIT (OUTPATIENT)
Dept: MIDWIFE SERVICES | Facility: CLINIC | Age: 37
End: 2017-04-12
Payer: COMMERCIAL

## 2017-04-12 VITALS
BODY MASS INDEX: 31.73 KG/M2 | HEART RATE: 89 BPM | WEIGHT: 202.6 LBS | SYSTOLIC BLOOD PRESSURE: 130 MMHG | TEMPERATURE: 98.2 F | DIASTOLIC BLOOD PRESSURE: 78 MMHG

## 2017-04-12 DIAGNOSIS — O09.521 ELDERLY MULTIGRAVIDA IN FIRST TRIMESTER: ICD-10-CM

## 2017-04-12 PROCEDURE — 99207 ZZC PRENATAL VISIT: CPT | Performed by: ADVANCED PRACTICE MIDWIFE

## 2017-04-12 NOTE — MR AVS SNAPSHOT
After Visit Summary   4/12/2017    Sakshi Parish    MRN: [id number]           Patient Information     Date Of Birth          1980        Visit Information        Provider Department      4/12/2017 4:30 PM Colleen Dean APRN CNM [facility]        Today's Diagnoses     Elderly multigravida in first trimester           Follow-ups after your visit        Follow-up notes from your care team     Return in about 1 week (around 4/19/2017) for pv.      Your next 10 appointments already scheduled     Apr 21, 2017  3:30 PM CDT   MyCs[facility]at OB Short with YUMIKO Young CNM   [facility] [place]    [address] [place] [address] [address]  [facility] [id number] [phone]            Apr 27, 2017  4:00 PM CDT   MyChart OB Short with YUMIKO Davalos CNM   [facility] [place]    [address] [place] [address] [address]  [facility] [id number] [phone]              Who to contact     If you have questions or need follow up information about today's clinic visit or your schedule please contact [facility] [place] at [phone].  Normal or non-critical lab and imaging results will be communicated to you by MyChart, letter or phone within 4 business days after the clinic has received the results. If you do not hear from us within 7 days, please contact the clinic through MyChart or phone. If you have a critical or abnormal lab result, we will notify you by phone as soon as possible.  Submit refill requests through [employer] or call your pharmacy and they will forward the refill request to us. Please allow 3 business days for your refill to be completed.          Additional Information About Your Visit        MyChart Information     [employer] gives you secure access to your electronic health record. If you see a primary care provider, you can also send messages to your care team  and make appointments. If you have questions, please call your primary care clinic.  If you do not have a primary care provider, please call [phone] and they will assist you.        Care EveryWhere ID     This is your Care EveryWhere ID. This could be used by other organizations to access your [place] medical records  [id number]        Your Vitals Were     Pulse Temperature Last Period BMI (Body Mass Index)          89 98.2  F (36.8  C) (Oral) 07/28/2016 (Approximate) 31.73 kg/m2         Blood Pressure from Last 3 Encounters:   04/12/17 130/78   04/06/17 124/61   03/31/17 121/78    Weight from Last 3 Encounters:   04/12/17 202 lb 9.6 oz (91.9 kg)   04/06/17 200 lb 14.4 oz (91.1 kg)   03/31/17 197 lb (89.4 kg)              Today, you had the following     No orders found for display       Primary Care Provider Office Phone #    [facility] [phone]       No address on file        Thank you!     Thank you for [place] [facility]  [place]. Our goal is always to provide you with excellent care. Hearing back from our patients is one way we can continue to improve our services. Please take a few minutes to complete the written survey that you may receive in the mail after your visit with us. Thank you!             Your Updated Medication List - Protect others around you: Learn how to safely use, store and throw away your medicines at www.disposemymeds.org.          This list is accurate as of: 4/12/17  4:32 PM.  Always use your most recent med list.                   Brand Name Dispense Instructions for use    fish oil-omega-3 fatty acids 1000 MG capsule      Take 2 g by mouth daily       fluticasone 50 MCG/ACT spray    FLONASE    1 Package    Spray 1-2 sprays into both nostrils daily       order for DME     1 Device    Equipment being ordered: Maternity support belt       PRENATAL VITAMIN PO

## 2017-04-12 NOTE — PROGRESS NOTES
36w6d  Reviewed labs GBS negative, hep C negative.  Pt did not get Waterbirth consent for review so will give now and PLEASE SIGN NEXT VISIT. Labor signs reviewed knows when to call. rtc in 1 week jeremiah

## 2017-04-16 ENCOUNTER — TELEPHONE (OUTPATIENT)
Dept: OBGYN | Facility: CLINIC | Age: 37
End: 2017-04-16

## 2017-04-16 ENCOUNTER — TELEPHONE (OUTPATIENT)
Dept: NURSING | Facility: CLINIC | Age: 37
End: 2017-04-16

## 2017-04-17 ENCOUNTER — HOSPITAL ENCOUNTER (INPATIENT)
Facility: CLINIC | Age: 37
LOS: 2 days | Discharge: HOME HEALTH | End: 2017-04-19
Attending: ADVANCED PRACTICE MIDWIFE | Admitting: ADVANCED PRACTICE MIDWIFE
Payer: COMMERCIAL

## 2017-04-17 PROBLEM — Z36.89 ENCOUNTER FOR TRIAGE IN PREGNANT PATIENT: Status: ACTIVE | Noted: 2017-04-17

## 2017-04-17 LAB
A1 MICROGLOB PLACENTAL VAG QL: POSITIVE
ABO + RH BLD: NORMAL
ABO + RH BLD: NORMAL
BASOPHILS # BLD AUTO: 0 10E9/L (ref 0–0.2)
BASOPHILS NFR BLD AUTO: 0.1 %
DIFFERENTIAL METHOD BLD: ABNORMAL
EOSINOPHIL # BLD AUTO: 0 10E9/L (ref 0–0.7)
EOSINOPHIL NFR BLD AUTO: 0.2 %
ERYTHROCYTE [DISTWIDTH] IN BLOOD BY AUTOMATED COUNT: 13.3 % (ref 10–15)
HCT VFR BLD AUTO: 40.9 % (ref 35–47)
HGB BLD-MCNC: 13.7 G/DL (ref 11.7–15.7)
IMM GRANULOCYTES # BLD: 0 10E9/L (ref 0–0.4)
IMM GRANULOCYTES NFR BLD: 0.3 %
LYMPHOCYTES # BLD AUTO: 1.8 10E9/L (ref 0.8–5.3)
LYMPHOCYTES NFR BLD AUTO: 15.8 %
MCH RBC QN AUTO: 30 PG (ref 26.5–33)
MCHC RBC AUTO-ENTMCNC: 33.5 G/DL (ref 31.5–36.5)
MCV RBC AUTO: 90 FL (ref 78–100)
MONOCYTES # BLD AUTO: 0.6 10E9/L (ref 0–1.3)
MONOCYTES NFR BLD AUTO: 5.2 %
NEUTROPHILS # BLD AUTO: 9.1 10E9/L (ref 1.6–8.3)
NEUTROPHILS NFR BLD AUTO: 78.4 %
NRBC # BLD AUTO: 0 10*3/UL
NRBC BLD AUTO-RTO: 0 /100
PLATELET # BLD AUTO: 246 10E9/L (ref 150–450)
RBC # BLD AUTO: 4.57 10E12/L (ref 3.8–5.2)
SPECIMEN EXP DATE BLD: NORMAL
WBC # BLD AUTO: 11.5 10E9/L (ref 4–11)

## 2017-04-17 PROCEDURE — 86901 BLOOD TYPING SEROLOGIC RH(D): CPT | Performed by: ADVANCED PRACTICE MIDWIFE

## 2017-04-17 PROCEDURE — 12000030 ZZH R&B OB INTERMEDIATE UMMC

## 2017-04-17 PROCEDURE — 84112 EVAL AMNIOTIC FLUID PROTEIN: CPT | Performed by: INTERNAL MEDICINE

## 2017-04-17 PROCEDURE — 59025 FETAL NON-STRESS TEST: CPT

## 2017-04-17 PROCEDURE — 99215 OFFICE O/P EST HI 40 MIN: CPT | Mod: 25

## 2017-04-17 PROCEDURE — 36415 COLL VENOUS BLD VENIPUNCTURE: CPT | Performed by: ADVANCED PRACTICE MIDWIFE

## 2017-04-17 PROCEDURE — 85025 COMPLETE CBC W/AUTO DIFF WBC: CPT | Performed by: ADVANCED PRACTICE MIDWIFE

## 2017-04-17 PROCEDURE — 86900 BLOOD TYPING SEROLOGIC ABO: CPT | Performed by: ADVANCED PRACTICE MIDWIFE

## 2017-04-17 PROCEDURE — 59400 OBSTETRICAL CARE: CPT | Performed by: ADVANCED PRACTICE MIDWIFE

## 2017-04-17 PROCEDURE — 72200001 ZZH LABOR CARE VAGINAL DELIVERY SINGLE

## 2017-04-17 PROCEDURE — 25000128 H RX IP 250 OP 636

## 2017-04-17 PROCEDURE — 25000132 ZZH RX MED GY IP 250 OP 250 PS 637: Performed by: ADVANCED PRACTICE MIDWIFE

## 2017-04-17 PROCEDURE — 86780 TREPONEMA PALLIDUM: CPT | Performed by: ADVANCED PRACTICE MIDWIFE

## 2017-04-17 RX ORDER — OXYCODONE HYDROCHLORIDE 5 MG/1
5-10 TABLET ORAL
Status: DISCONTINUED | OUTPATIENT
Start: 2017-04-17 | End: 2017-04-19 | Stop reason: HOSPADM

## 2017-04-17 RX ORDER — OXYCODONE AND ACETAMINOPHEN 5; 325 MG/1; MG/1
1 TABLET ORAL
Status: DISCONTINUED | OUTPATIENT
Start: 2017-04-17 | End: 2017-04-17

## 2017-04-17 RX ORDER — ACETAMINOPHEN 325 MG/1
650 TABLET ORAL EVERY 4 HOURS PRN
Status: DISCONTINUED | OUTPATIENT
Start: 2017-04-17 | End: 2017-04-17

## 2017-04-17 RX ORDER — OXYTOCIN/0.9 % SODIUM CHLORIDE 30/500 ML
100 PLASTIC BAG, INJECTION (ML) INTRAVENOUS CONTINUOUS
Status: DISCONTINUED | OUTPATIENT
Start: 2017-04-17 | End: 2017-04-19 | Stop reason: HOSPADM

## 2017-04-17 RX ORDER — BISACODYL 10 MG
10 SUPPOSITORY, RECTAL RECTAL DAILY PRN
Status: DISCONTINUED | OUTPATIENT
Start: 2017-04-19 | End: 2017-04-19 | Stop reason: HOSPADM

## 2017-04-17 RX ORDER — OXYTOCIN/0.9 % SODIUM CHLORIDE 30/500 ML
340 PLASTIC BAG, INJECTION (ML) INTRAVENOUS CONTINUOUS PRN
Status: DISCONTINUED | OUTPATIENT
Start: 2017-04-17 | End: 2017-04-19 | Stop reason: HOSPADM

## 2017-04-17 RX ORDER — FENTANYL CITRATE 50 UG/ML
50-100 INJECTION, SOLUTION INTRAMUSCULAR; INTRAVENOUS
Status: DISCONTINUED | OUTPATIENT
Start: 2017-04-17 | End: 2017-04-17

## 2017-04-17 RX ORDER — MISOPROSTOL 200 UG/1
400 TABLET ORAL
Status: DISCONTINUED | OUTPATIENT
Start: 2017-04-17 | End: 2017-04-19 | Stop reason: HOSPADM

## 2017-04-17 RX ORDER — OXYTOCIN 10 [USP'U]/ML
10 INJECTION, SOLUTION INTRAMUSCULAR; INTRAVENOUS
Status: DISCONTINUED | OUTPATIENT
Start: 2017-04-17 | End: 2017-04-19 | Stop reason: HOSPADM

## 2017-04-17 RX ORDER — NALOXONE HYDROCHLORIDE 0.4 MG/ML
.1-.4 INJECTION, SOLUTION INTRAMUSCULAR; INTRAVENOUS; SUBCUTANEOUS
Status: DISCONTINUED | OUTPATIENT
Start: 2017-04-17 | End: 2017-04-19 | Stop reason: HOSPADM

## 2017-04-17 RX ORDER — OXYTOCIN 10 [USP'U]/ML
INJECTION, SOLUTION INTRAMUSCULAR; INTRAVENOUS
Status: COMPLETED
Start: 2017-04-17 | End: 2017-04-17

## 2017-04-17 RX ORDER — ACETAMINOPHEN 325 MG/1
650 TABLET ORAL EVERY 4 HOURS PRN
Status: DISCONTINUED | OUTPATIENT
Start: 2017-04-17 | End: 2017-04-19 | Stop reason: HOSPADM

## 2017-04-17 RX ORDER — CARBOPROST TROMETHAMINE 250 UG/ML
250 INJECTION, SOLUTION INTRAMUSCULAR
Status: DISCONTINUED | OUTPATIENT
Start: 2017-04-17 | End: 2017-04-17

## 2017-04-17 RX ORDER — IBUPROFEN 800 MG/1
800 TABLET, FILM COATED ORAL
Status: DISCONTINUED | OUTPATIENT
Start: 2017-04-17 | End: 2017-04-17

## 2017-04-17 RX ORDER — SODIUM CHLORIDE, SODIUM LACTATE, POTASSIUM CHLORIDE, CALCIUM CHLORIDE 600; 310; 30; 20 MG/100ML; MG/100ML; MG/100ML; MG/100ML
INJECTION, SOLUTION INTRAVENOUS CONTINUOUS
Status: DISCONTINUED | OUTPATIENT
Start: 2017-04-17 | End: 2017-04-17

## 2017-04-17 RX ORDER — AMOXICILLIN 250 MG
1-2 CAPSULE ORAL 2 TIMES DAILY
Status: DISCONTINUED | OUTPATIENT
Start: 2017-04-17 | End: 2017-04-19 | Stop reason: HOSPADM

## 2017-04-17 RX ORDER — NALOXONE HYDROCHLORIDE 0.4 MG/ML
.1-.4 INJECTION, SOLUTION INTRAMUSCULAR; INTRAVENOUS; SUBCUTANEOUS
Status: DISCONTINUED | OUTPATIENT
Start: 2017-04-17 | End: 2017-04-17

## 2017-04-17 RX ORDER — METHYLERGONOVINE MALEATE 0.2 MG/ML
200 INJECTION INTRAVENOUS
Status: DISCONTINUED | OUTPATIENT
Start: 2017-04-17 | End: 2017-04-17

## 2017-04-17 RX ORDER — LIDOCAINE HYDROCHLORIDE 10 MG/ML
INJECTION, SOLUTION EPIDURAL; INFILTRATION; INTRACAUDAL; PERINEURAL
Status: DISCONTINUED
Start: 2017-04-17 | End: 2017-04-17 | Stop reason: HOSPADM

## 2017-04-17 RX ORDER — OXYTOCIN/0.9 % SODIUM CHLORIDE 30/500 ML
100-340 PLASTIC BAG, INJECTION (ML) INTRAVENOUS CONTINUOUS PRN
Status: DISCONTINUED | OUTPATIENT
Start: 2017-04-17 | End: 2017-04-17

## 2017-04-17 RX ORDER — IBUPROFEN 400 MG/1
400-800 TABLET, FILM COATED ORAL EVERY 6 HOURS PRN
Status: DISCONTINUED | OUTPATIENT
Start: 2017-04-17 | End: 2017-04-19 | Stop reason: HOSPADM

## 2017-04-17 RX ORDER — LANOLIN 100 %
OINTMENT (GRAM) TOPICAL
Status: DISCONTINUED | OUTPATIENT
Start: 2017-04-17 | End: 2017-04-19 | Stop reason: HOSPADM

## 2017-04-17 RX ORDER — ONDANSETRON 2 MG/ML
4 INJECTION INTRAMUSCULAR; INTRAVENOUS EVERY 6 HOURS PRN
Status: DISCONTINUED | OUTPATIENT
Start: 2017-04-17 | End: 2017-04-17

## 2017-04-17 RX ORDER — OXYTOCIN 10 [USP'U]/ML
10 INJECTION, SOLUTION INTRAMUSCULAR; INTRAVENOUS
Status: COMPLETED | OUTPATIENT
Start: 2017-04-17 | End: 2017-04-17

## 2017-04-17 RX ORDER — HYDROCORTISONE 2.5 %
CREAM (GRAM) TOPICAL 3 TIMES DAILY PRN
Status: DISCONTINUED | OUTPATIENT
Start: 2017-04-17 | End: 2017-04-19 | Stop reason: HOSPADM

## 2017-04-17 RX ADMIN — OXYTOCIN 10 UNITS: 10 INJECTION, SOLUTION INTRAMUSCULAR; INTRAVENOUS at 19:18

## 2017-04-17 RX ADMIN — OXYTOCIN 10 UNITS: 10 INJECTION INTRAVENOUS at 19:18

## 2017-04-17 RX ADMIN — IBUPROFEN 800 MG: 400 TABLET ORAL at 19:45

## 2017-04-17 NOTE — TELEPHONE ENCOUNTER
"Call Type: Triage Call    Presenting Problem: \"I am 37 + nweeks pregnant and I think my water  broke, around 9 PM.\" Pt. says that she is had a spot of blood, too.  Pt. also has tightening of her abdomen. Alma Mcwilliams CNM, was  then paged, to call pt., at: [phone], at: [phone], via smart web.  Triage Note:  Guideline Title: Pregnancy: Signs of Labor, 37 Weeks or Greater  Recommended Disposition: Call Provider Immediately  Original Inclination: Wanted to speak with a nurse  Override Disposition:  Intended Action: Call PCP/HCP  Physician Contacted: No  Sudden gush or trickle of fluid from the vagina ?  YES  First childbirth with regular contractions for 1 hour and contractions are  feeling stronger and closer together. ? NO  New or worsening signs and symptoms that may indicate shock ? NO  Feeling of baby coming or wanting to push (urge to bear down) ? NO  Umbilical cord or any part of the baby (head, bottom, arm or leg) at the opening  of the vagina ? NO  Gestation 20 to 37 weeks ? NO  Gush or leakage of green or green-tinged or port-wine colored fluid (reddish and  watery) from the vagina ? NO  Previous childbirth AND moderate intensity contractions less than 5 minutes apart  for 1 hour or history of rapid delivery (less than 6 hours of labor) ? NO  Decreased fetal movement (less than 10 kicks/movements within two hours or a  significant change in usual pattern compared to previous days) ? NO  No relief between contractions ? NO  Continuous bright red vaginal bleeding for more than 15 minutes (more than  spotting) ? NO  Physician Instructions:  Care Advice: Lie on left side, if possible.  CAUTIONS  SYMPTOM / CONDITION MANAGEMENT  Follow your provider's instructions for the management of these signs and  symptoms.  See another provider immediately if unable to talk with your provider  within 1 hour. Follow the directions from your provider's on call resource  if you are unable to speak to your provider directly. "  You may be directed  to go to the hospital's Labor & Delivery department for evaluation. Another  adult should drive.  May have clear liquids (such as water, clear fruit juices without pulp,  soda, tea or coffee without dairy or non-dairy creamer, clear broth or  bouillon, oral hydration solution, or plain gelatin, fruit ices/popsicles,  hard candy) but do not eat solid foods before talking with your provider.  Call  if any of these occur: profuse bright red vaginal bleeding  continuous (without relaxation) abdominal pain  the umbilical cord or any fetal part in vagina  bag of froeman coming through vagina  feeling of wanting to push or have a bowel movement.

## 2017-04-17 NOTE — PROGRESS NOTES
S: Natalya says contractions are much stronger.  She is sitting on the birth ball with her  massaging her back.  She ordered lunch, but was not able to eat.      O:  Blood pressure 119/72, temperature 98.2  F (36.8  C), temperature source Oral, last menstrual period 2016, not currently breastfeeding.  General appearance: uncomfortable with contractions    CONTRACTIONS: Contractions every 2 minutes.  Palpate: moderate  FETAL HEART TONES: baseline 135 with moderate FHR variability and    accelerations yes. Decelerations no.    NST: REACTIVE  CST: NOT DONE  ROM: clear fluid  PELVIC EXAM:deferred  Fetal Position:  Cephalic by BSUS done upon admission  Bloody show: No  Pitocin- none,  Antibiotics- none  Cervical ripening: N/A  ASSESSMENT:  ==============  IUP @ 37w4d early labor   Fetal Heart rate tracing Category category one  GBS- negative  AMA, Maternal obesity    PLAN:  ===========  comfort measures prn   Pain medication as needed/desired  Anticipate   reevaluate in 2-4 hours/PRN     Nikky Pereira CNM

## 2017-04-17 NOTE — TELEPHONE ENCOUNTER
Call back to Natalya.  She reports that she continues to leak clear, sometimes pinkish fluid.  Baby is active.  She is having some mild contractions that seem to be getting stronger.  We discussed risk of infection and possible need for induction, augmentation.  She verbalized understanding.  She is going to get ready and come in.  She plans to be in within the next couple of hours.  L&D notified.

## 2017-04-17 NOTE — IP AVS SNAPSHOT
[facility]    [address] [facility] [address]    Phone:  [phone]                                       After Visit Summary   4/17/2017    Sakshi Parish    MRN: [id number]           After Visit Summary Signature Page     I have received my discharge instructions, and my questions have been answered. I have discussed any challenges I see with this plan with the nurse or doctor.    ..........................................................................................................................................  Patient/Patient Representative Signature      ..........................................................................................................................................  Patient Representative Print Name and Relationship to Patient    ..................................................               ................................................  Date                                            Time    ..........................................................................................................................................  Reviewed by Signature/Title    ...................................................              ..............................................  Date                                                            Time

## 2017-04-17 NOTE — IP AVS SNAPSHOT
MRN:[id number]                      After Visit Summary   4/17/2017    Sakshi Parish    MRN: [id number]           Thank you!     Thank you for choosing [facility] for your care. Our goal is always to provide you with excellent care. Hearing back from our patients is one way we can continue to improve our services. Please take a few minutes to complete the written survey that you may receive in the mail after you visit with us. Thank you!        Patient Information     Date Of Birth          1980        Designated Caregiver       Most Recent Value    Caregiver    Will someone help with your care after discharge? no      About your hospital stay     You were admitted on:  April 17, 2017 You last received care in the:  [facility]    You were discharged on:  April 19, 2017       Who to Call     For medical emergencies, please call 911.  For non-urgent questions about your medical care, please call your primary care provider or clinic, [phone]          Attending Provider     Provider Specialty    Nikky Pereira CNM [facility]       Primary Care Provider Office Phone #    [facility] [phone]       No address on file        Your next 10 appointments already scheduled     Apr 21, 2017  3:30 PM CDT   MyChart OB Short with YUMIKO Young CNM   [facility] ([facility])    [address] [place] [place] [id number] [phone]            Apr 27, 2017  4:00 PM CDT   MyChart OB Short with YUMIKO Davalos CNM   [facility] ([facility])    [address] [place] [id number] [phone]              Further instructions from your care team       Postpartum Vaginal Delivery Instructions    Activity       Ask family and friends for help when you need it.    Do not place anything in your vagina for 6 weeks.    You are not restricted on other  activities, but take it easy for a few weeks to allow your body to recover from delivery.  You are able to do any activities you feel up to that point.    No driving until you have stopped taking your pain medications (usually two weeks after delivery).     Call your health care provider if you have any of these symptoms:       Increased pain, swelling, redness, or fluid around your stiches from an episiotomy or perineal tear.    A fever above 100.4 F (38 C) with or without chills when placing a thermometer under your tongue.    You soak a sanitary pad with blood within 1 hour, or you see blood clots larger than a golf ball.    Bleeding that lasts more than 6 weeks.    Vaginal discharge that smells bad.    Severe pain, cramping or tenderness in your lower belly area.    A need to urinate more frequently (use the toilet more often), more urgently (use the toilet very quickly), or it burns when you urinate.    Nausea and vomiting.    Redness, swelling or pain around a vein in your leg.    Problems breastfeeding or a red or painful area on your breast.    Chest pain and cough or are gasping for air.    Problems coping with sadness, anxiety, or depression.  If you have any concerns about hurting yourself or the baby, call your provider immediately.     You have questions or concerns after you return home.     Keep your hands clean:  Always wash your hands before touching your perineal area and stitches.  This helps reduce your risk of infection.  If your hands aren't dirty, you may use an alcohol hand-rub to clean your hands. Keep your nails clean and short.        Pending Results     No orders found from 4/15/2017 to 4/18/2017.            Statement of Approval     Ordered          04/19/17 1156  I have reviewed and agree with all the recommendations and orders detailed in this document.  EFFECTIVE NOW     Approved and electronically signed by:  Neris Prado APRN CNM             Admission Information     Date &  Time Provider Department Dept. Phone    4/17/2017 Nikky Pereira, CNKEITH [facility] [phone]      Your Vitals Were     Blood Pressure Pulse Temperature Respirations Last Period       117/65 85 98  F (36.7  C) (Oral) 16 07/28/2016 (Approximate)       MyChart Information     [employer] gives you secure access to your electronic health record. If you see a primary care provider, you can also send messages to your care team and make appointments. If you have questions, please call your primary care clinic.  If you do not have a primary care provider, please call [phone] and they will assist you.        Care EveryWhere ID     This is your Care EveryWhere ID. This could be used by other organizations to access your [facility] medical records  [id number]           Review of your medicines      START taking        Dose / Directions    ibuprofen 600 MG tablet   Commonly known as:  ADVIL/MOTRIN   Used for:  Routine postpartum follow-up        Dose:  600 mg   Take 1 tablet (600 mg) by mouth every 6 hours as needed for moderate pain   Quantity:  100 tablet   Refills:  0       senna 8.6 MG tablet   Commonly known as:  SENOKOT   Used for:  Routine postpartum follow-up        Dose:  1 tablet   Take 1 tablet by mouth daily   Quantity:  100 tablet   Refills:  0         CONTINUE these medicines which have NOT CHANGED        Dose / Directions    fish oil-omega-3 fatty acids 1000 MG capsule        Dose:  2 g   Take 2 g by mouth daily   Refills:  0       fluticasone 50 MCG/ACT spray   Commonly known as:  FLONASE   Used for:  Allergic rhinitis, cause unspecified        Dose:  1-2 spray   Spray 1-2 sprays into both nostrils daily   Quantity:  1 Package   Refills:  2       order for DME   Used for:  Back pain in pregnancy        Equipment being ordered: Maternity support belt   Quantity:  1 Device   Refills:  0       PRENATAL VITAMIN PO        Refills:  0       VITAMIN D (CHOLECALCIFEROL) PO        Dose:  1000 Units   Take 1,000  Units by mouth daily   Refills:  0            Where to get your medicines      These medications were sent to [facility] [place], [place] - [address]  [address], [facility] [address]     Phone:  [phone]     ibuprofen 600 MG tablet    senna 8.6 MG tablet                Protect others around you: Learn how to safely use, store and throw away your medicines at www.disposemymeds.org.             Medication List: This is a list of all your medications and when to take them. Check marks below indicate your daily home schedule. Keep this list as a reference.      Medications           Morning Afternoon Evening Bedtime As Needed    fish oil-omega-3 fatty acids 1000 MG capsule   Take 2 g by mouth daily                                fluticasone 50 MCG/ACT spray   Commonly known as:  FLONASE   Spray 1-2 sprays into both nostrils daily                                ibuprofen 600 MG tablet   Commonly known as:  ADVIL/MOTRIN   Take 1 tablet (600 mg) by mouth every 6 hours as needed for moderate pain   Last time this was given:  800 mg on 4/19/2017  8:13 AM                                order for DME   Equipment being ordered: Maternity support belt                                PRENATAL VITAMIN PO                                senna 8.6 MG tablet   Commonly known as:  SENOKOT   Take 1 tablet by mouth daily                                VITAMIN D (CHOLECALCIFEROL) PO   Take 1,000 Units by mouth daily

## 2017-04-17 NOTE — PLAN OF CARE
Data: Patient presented to [facility] at 1105.   Reason for maternal/fetal assessment per patient is Rule out rupture of membranes  .  Patient is a . Prenatal record reviewed.      Obstetric History       T0      TAB0   SAB0   E0   M0   L0       # Outcome Date GA Lbr Santiago/2nd Weight Sex Delivery Anes PTL Lv   1 Current               . Medical history:   Past Medical History:   Diagnosis Date     ADD (attention deficit disorder)      Family history of malignant melanoma of skin 2/3/2017     Goiter 2015   . Gestational Age 37w4d. VSS. Fetal movement present. Patient reports SROM of clear fluid last evening at 2100, she had been in contact with [facility] by phone, and since her contractions have been 5 minutes apart for a couple hours she decided to come in to triage.  She denies pelvic pressure, UTI symptoms, GI problems, bloody show, vaginal bleeding, edema, headache, visual disturbances, epigastric or URQ pain, abdominal pain.  Supportive  Duc present.  Action: Verbal consent for EFM. Triage assessment completed. EFM applied for NST. Reactive NST obtained,  Amnisure ordered and completed, results positive.  received orders for admission from Nikyk Lino [facility].  Response: Patient verbalized agreement with plan. Patient transferred to room 481 ambulatory, oriented to room and call light. Continue to care for patient.

## 2017-04-17 NOTE — TELEPHONE ENCOUNTER
"Returned call to Natalya. Reports small gush of fluid at 0900 that is clear. Has had to change her pad 3x since, all clear but now reports some scant bloody show. Baby has been active since ROM. Reviewed chart and is GBS negative. Denies contractions. Does feel painless \"tightenings\" that she reports are irregular and she has not been timing. Patient knows she can come to the birthplace at anytime. Prefers to stay home and wait for labor and will try get some sleep. Plans low intervention birth and to use comfort measures to cope with labor and possible waterbirth. Encouraged to rest now. Recommend that she call and come into the birth place when contractions are every 5 minutes and lasting one minute or by 0900 for amnisure and possible IOL if not in labor by then. She agrees with plan. Personal pager number given for patient to contact [facility] for any questions or concerns. Birthplace notified about patient possible arrival.     Alma Mcwilliams CNM   "

## 2017-04-17 NOTE — H&P
Sakshi Parish is a 36 year old female,  ,   partnered,     Patient's last menstrual period was 2016 (approximate)., Estimated Date of Delivery: May 4, 2017 is calculated from lmp and verified with U/S     Pt is admitted to the Birthplace on 2017 at 11:51 AM     in early labor.  Membranes are ruptured since 2100 on 17 and verified with positive amnisure.      HPI: Noticed clear water leaking since about 9:00pm last night.  Some contractions during the night which have gotten stronger this morning.      PRENATAL COURSE  Prenatal care began at 18 wks gestation for a total of 8 prenatal visits.  Total wt gain 10 lbs  Prenatal vital signs WNL  Prenatal course was essentially uncomplicated    HISTORIES  No Known Allergies  Past Medical History:   Diagnosis Date     ADD (attention deficit disorder)      Family history of malignant melanoma of skin 2/3/2017     Goiter 2015     Past Surgical History:   Procedure Laterality Date     CHOLECYSTECTOMY       Family History   Problem Relation Age of Onset     Hypertension Maternal Grandfather      CEREBROVASCULAR DISEASE Maternal Grandfather      Hypertension Paternal Grandfather      Ovarian Cancer Maternal Grandmother           Skin Cancer Mother      Hypothyroidism Mother      Family History Negative Father      Ovarian Cancer Maternal Aunt      66-     Skin Cancer Maternal Aunt      Cancer - colorectal No family hx of      Breast Cancer No family hx of      Social History   Substance Use Topics     Smoking status: Never Smoker     Smokeless tobacco: Never Used     Alcohol use 0.0 oz/week     0 Standard drinks or equivalent per week      Comment: occ     Obstetric History       T0      TAB0   SAB0   E0   M0   L0       # Outcome Date GA Lbr Santiago/2nd Weight Sex Delivery Anes PTL Lv   1 Current                   LABS:     Lab Results   Component Value Date    ABO A 2016       Lab Results    Component Value Date    RH  Pos 2016     Rhogam not indicated  Rubella immune   Treponema Pallidum Antibody  Negative  HIV    Non-reactive  Lab Results   Component Value Date    HGB 13.0 2017      Lab Results   Component Value Date    HEPBANG Nonreactive 2016     Lab Results   Component Value Date    GBS  2017     Negative  No GBS DNA detected, presumed negative for GBS or number of bacteria may be   below the limit of detection of the assay.   Assay performed on incubated broth culture of specimen using [employer] real-time   PCR.         ROS  Pt denies significant constitutional symptoms including fever and/or malaise.  Pt denies significant respiratory, cardiovacular, GI, or muscular/skeletal complaints.      PHYSICAL EXAM:  Temp 98.3  F (36.8  C) (Oral)  LMP 2016 (Approximate)  General appearance healthy, alert, active, no distress, cooperative and smiling   Neuro:  denies headache and visual disturbances  Psych: Mentation normal and bright   Legs: 2+/2+, no clonus, no edema       Abdomen: gravid, single vertex fetus, non-tender, EFW 6 lbs. Pt is amarilys every 2-3 minutes, lasting 60 seconds and palpates mild    FETAL HEART TONES: baseline 135 with moderate FHRV variability and accelerations.  No decelerations present.     PELVIC EXAM: Deferred due to ruptured, early labor  BLOODY SHOW:: no  Membranes as listed above  Cephalic by Leabramds and BSUS    ASSESSMENT:  IUP @ 37 wks gestation  in early labor and ruptured  NST  reactive   Parity: Primip  GBS negative and membranes ruptured for 14 hours  AMA  Maternal Obesity    PLAN:  Admit - see IP orders  Discussed pitocin augmentation due to risk of infection.  They would like to wait.  Discussed comfort measures    Blood tests ordered  Pain medication as needed/desired.  Plan to reassess in 2 hours or as needed.    Anticipate     Nikky Pereira, JEFF, APRN, CNM

## 2017-04-17 NOTE — PROGRESS NOTES
S: Accompanied by partner, Duc, says contractions feel stronger.      O:  Blood pressure 119/72, temperature 98.2  F (36.8  C), temperature source Oral, last menstrual period 2016, not currently breastfeeding.  General appearance:  serious    CONTRACTIONS: Contractions every 3 minutes.  Palpate: moderate  FETAL HEART TONES: baseline 135 with moderate FHR variability and    accelerations yes. Decelerations no.    NST: REACTIVE  CST: NOT DONE and NEGATIVE  ROM: clear fluid  PELVIC EXAM:deferred  Fetal Position:  Cephalic by BSUS  Bloody show: No  Pitocin- none,  Antibiotics- PCN  Cervical ripening: N/A  ASSESSMENT:  ==============  IUP @ 37w4d early labor, ruptured    Fetal Heart rate tracing Category category one  GBS- negative  AMA, maternal obesity   PLAN:  ===========  comfort measures prn   Pain medication as needed/desired  Anticipate   reevaluate in 2-4 hours/PRN     Nikky Pereira CNM

## 2017-04-18 LAB
HGB BLD-MCNC: 12 G/DL (ref 11.7–15.7)
T PALLIDUM IGG+IGM SER QL: NORMAL

## 2017-04-18 PROCEDURE — 36415 COLL VENOUS BLD VENIPUNCTURE: CPT | Performed by: ADVANCED PRACTICE MIDWIFE

## 2017-04-18 PROCEDURE — 12000028 ZZH R&B OB UMMC

## 2017-04-18 PROCEDURE — 25000132 ZZH RX MED GY IP 250 OP 250 PS 637: Performed by: ADVANCED PRACTICE MIDWIFE

## 2017-04-18 PROCEDURE — 85018 HEMOGLOBIN: CPT | Performed by: ADVANCED PRACTICE MIDWIFE

## 2017-04-18 RX ORDER — SENNOSIDES A AND B 8.6 MG/1
1 TABLET, FILM COATED ORAL DAILY
Qty: 100 TABLET | Refills: 0 | Status: SHIPPED | OUTPATIENT
Start: 2017-04-18 | End: 2017-06-02

## 2017-04-18 RX ORDER — IBUPROFEN 600 MG/1
600 TABLET, FILM COATED ORAL EVERY 6 HOURS PRN
Qty: 100 TABLET | Refills: 0 | Status: SHIPPED | OUTPATIENT
Start: 2017-04-18 | End: 2019-09-09

## 2017-04-18 RX ADMIN — SENNOSIDES AND DOCUSATE SODIUM 2 TABLET: 8.6; 5 TABLET ORAL at 08:33

## 2017-04-18 RX ADMIN — ACETAMINOPHEN 650 MG: 325 TABLET, FILM COATED ORAL at 22:31

## 2017-04-18 RX ADMIN — IBUPROFEN 800 MG: 400 TABLET ORAL at 01:45

## 2017-04-18 RX ADMIN — IBUPROFEN 800 MG: 400 TABLET ORAL at 18:51

## 2017-04-18 RX ADMIN — ACETAMINOPHEN 650 MG: 325 TABLET, FILM COATED ORAL at 14:06

## 2017-04-18 RX ADMIN — ACETAMINOPHEN 650 MG: 325 TABLET, FILM COATED ORAL at 00:00

## 2017-04-18 RX ADMIN — SENNOSIDES AND DOCUSATE SODIUM 1 TABLET: 8.6; 5 TABLET ORAL at 20:57

## 2017-04-18 RX ADMIN — IBUPROFEN 800 MG: 400 TABLET ORAL at 08:34

## 2017-04-18 NOTE — PLAN OF CARE
Problem: Goal Outcome Summary  Goal: Goal Outcome Summary  Outcome: Improving  Pt vitals & PP assessments are stable. Voiding without difficulties. IBU relieved her discomfort. Positive bonding with baby observed.

## 2017-04-18 NOTE — PROGRESS NOTES
[facility], [place]    Post-Partum Progress Note    Assessment & Plan   Assessment:  Post-partum day #1  Normal spontaneous vaginal delivery    Doing well.  Normal healing laceration.  No excessive bleeding  Pain well-controlled.  Tolerating physical activity well.  Breastfeeding well.   No questions or concerns today     Plan:  Ambulation encouraged  Breast feeding strategies discussed  Lactation consultation  Reportable signs and symptoms dicussed with the patient  Anticipate discharge tomorrow  Discharge instructions given   Plans to return to clinic at 6 weeks. Most likely will get Nexplanon or use pills     Lucy Gamboa     Interval History   Doing well.  Pain is well-controlled.  No fevers.  No history of foul-smelling vaginal discharge.  Good appetite.  Denies chest pain, shortness of breath, nausea or vomiting.  Vaginal bleeding is similar to a heavy menstrual flow.  Ambulatory.  Breastfeeding well.    Medications     oxytocin in 0.9% NaCl       oxytocin in 0.9% NaCl       NO Rho (D) immune globulin (RhoGam) needed - mother Rh POSITIVE       - MEDICATION INSTRUCTIONS -       - MEDICATION INSTRUCTIONS -         senna-docusate  1-2 tablet Oral BID       Physical Exam   Temp: 98.2  F (36.8  C) Temp src: Oral BP: (P) 118/74 Pulse: (P) 85   Resp: (P) 16        There were no vitals filed for this visit.  Vital Signs with Ranges  Temp:  [97.4  F (36.3  C)-98.3  F (36.8  C)] 98.2  F (36.8  C)  Pulse:  [80-85] (P) 85  Resp:  [16] (P) 16  BP: (102-122)/(53-72) (P) 118/74  I/O last 3 completed shifts:  In: -   Out: 261 [Blood:261]    Uterine fundus is firm, non-tender and at the level of the umbilicus    Data   Recent Labs   Lab Test  04/17/17   1216  09/27/16   0948   ABO  A  A   RH   Pos   Pos   AS   --   Neg     Recent Labs   Lab Test  04/18/17   0700  04/17/17   1216   HGB  12.0  13.7     Recent Labs   Lab Test  09/27/16   0948   RUQIGG  23     Lucy Gamboa [facility]

## 2017-04-18 NOTE — L&D DELIVERY NOTE
Delivery Summary    [id number]   Age: 36 year old YOB: 1980     Elsa called last night with possible rupture of membranes without any contractions.  She chose to stay home and try to sleep.  She declined induction for augmentation.  She came in in the morning with regular contractions.  When she started feeling very uncomfortable, she asked to be checked and was 5 cm.  She used nitrous oxide for comfort.  She soon felt an urge to push.  We tried to help her breathe through her contractions until she was complete.  She progressed rapidly.  She pushed with great strength with the calm support of Duc.  When their son was born, she reached down so that she could pull him up to her chest.  Beautiful birth!      Delivery Note  IUP at 37 weeks gestation delivered on 2017.     delivery of a viable Male infant.  Apgars of 8 at 1 minute and 9 at 5 minutes.  Labor was spontaneous.  Medications administered  in labor:  Pain Rx nitrous oxide; Antibiotics No  Perineum: 1st degree, not repaired  Placenta-mechanism: spontaneous, intact,  with a 3 vessel cord. IM oxytocin was given.  Estimated Blood Loss was see RN QBL.  Complications of pregnancy, labor and delivery: ROM > 18 hours  Birth attendants: Nikky Pereira, JEFF, APRN, CNM              Labor Event Times    Labor onset date:  17 Onset time:  12:00 AM CDT   Dilation complete date:  17 Complete time:   6:15 PM   Start pushing date/time:  2017 1815            Labor Events     labor?:  No    steroids:  None   Labor Type:  Spontaneous      Rupture date/time:     Rupture type:  Spontaneous rupture of membranes occuring during spontaneous labor or augmentation   Fluid color:  Clear   Fluid odor:  Normal      1:1 continuous labor support provided by?:  RN, provider Labor partogram used?:  no         Delivery/Placenta Date and Time    Delivery Date:  17 Delivery Time:   7:00 PM   Placenta  Date/Time:  2017  7:17 PM   Oxytocin given at the time of delivery:  after delivery of placenta      Vaginal Counts    Initial count performed by 2 team members:   Two Team Members   Franklin Lino          Needles Suture [place] [employer]   Initial counts 2  5    Added to count       Final counts 2  5       Placed during labor Accounted for at the end of labor   NA    NA    NA       Final count performed by 2 team members:   Two Team Members   Josselin Lino         Final count correct?:  Yes         Apgars    Living status:  Yes    1 Minute 5 Minute 10 Minute 15 Minute 20 Minute   Skin color: 1  1       Heart rate: 2  2       Reflex irritability: 2  2       Muscle tone: 1  2       Respiratory effort: 2  2       Total: 8  9          Apgars assigned by:  JALYN RAMSAY RN      Cord     Complications:  Nuchal    Gases Sent?:  No         Inlet Resuscitation    Methods:  None       Care at Delivery:  Parents desired no intervention or stimulation at birth. Infant placed on mom's abdomen. Cry by 10-15 seconds of birth, tone initially fair,but good before 5 min.      Skin to Skin and Feeding Plan    Skin to skin initiation date/time: 17 1900   Skin to skin with:  Mother   Skin to skin end date/time:        Labor Events and Shoulder Dystocia    Fetal Tracing Prior to Delivery:  Category 1   Shoulder dystocia present?:  Neg            Delivery (Maternal) (Provider to Complete) (316419)    Episiotomy:  None   Perineal lacerations:  1st Repaired?:  No         Mother's Information  Mother: ManolocherriSakshi #[id number]    Start of Mother's Information     IO Blood Loss  17 0000 - 17 1933    Mom's I/O Activity            End of Mother's Information  Mother: Sakshi Parish #[id number]            Delivery - Provider to Complete (515896)    CNM Care:  Exclusive CNM care in labor   Delivery Type (Choose the 1 that will go to the Birth  History):  Vaginal, Spontaneous Delivery                           Placenta    Delayed Cord Clamping:  Done   Date/Time:  4/17/2017  7:17 PM   Removal:  Spontaneous   Disposition:  Hospital disposal      Anesthesia    Method:  Nitrous Oxide         Presentation and Position    Presentation:  Vertex   Position:  Left Occiput Anterior                    Nikky MJd Pereira CNM

## 2017-04-18 NOTE — DOWNTIME EVENT NOTE
The EMR was down for 10 hours on 4/18/2017.    Elizabeth Vogel  was responsible for completing the paper charting during this time period.     The following information was re-entered into the system by MONTANA GARZA: Flowsheet data and MAR    The following information will remain in the paper chart: Flowsheets, notes    MONTANA GARZA  4/18/2017

## 2017-04-19 VITALS
SYSTOLIC BLOOD PRESSURE: 117 MMHG | TEMPERATURE: 98 F | RESPIRATION RATE: 16 BRPM | HEART RATE: 85 BPM | DIASTOLIC BLOOD PRESSURE: 65 MMHG

## 2017-04-19 PROCEDURE — 25000132 ZZH RX MED GY IP 250 OP 250 PS 637: Performed by: ADVANCED PRACTICE MIDWIFE

## 2017-04-19 RX ADMIN — ACETAMINOPHEN 650 MG: 325 TABLET, FILM COATED ORAL at 06:19

## 2017-04-19 RX ADMIN — IBUPROFEN 800 MG: 400 TABLET ORAL at 08:13

## 2017-04-19 RX ADMIN — SENNOSIDES AND DOCUSATE SODIUM 2 TABLET: 8.6; 5 TABLET ORAL at 08:13

## 2017-04-19 RX ADMIN — IBUPROFEN 800 MG: 400 TABLET ORAL at 02:01

## 2017-04-19 NOTE — PLAN OF CARE
Problem: Postpartum, Vaginal Delivery (Adult)  Goal: Signs and Symptoms of Listed Potential Problems Will be Absent or Manageable (Postpartum, Vaginal Delivery)  Signs and symptoms of listed potential problems will be absent or manageable by discharge/transition of care (reference Postpartum, Vaginal Delivery (Adult) CPG).   Outcome: Therapy, progress toward functional goals as expected  Reviewed and stable 4/18/17, CAB RN

## 2017-04-19 NOTE — PLAN OF CARE
Problem: Goal Outcome Summary  Goal: Goal Outcome Summary  Outcome: Therapy, progress toward functional goals as expected  VSS throughout shift. Patient breastfeeding on cue with no assistance. Complaints of slight cramping after breastfeeding. PRN meds given and relieves discomfort. Patient ambulating to the bathroom well. Postpartum checks are WNL. Mother providing skin to skin and appears to be bonding well with baby.

## 2017-04-19 NOTE — PLAN OF CARE
Problem: Goal Outcome Summary  Goal: Goal Outcome Summary  Outcome: Therapy, progress toward functional goals as expected  Pt offers minimal complaints of uterine pain for which she is taking ibuprofen and tylenol.  Up in room without difficulty.  Bonding well with her baby.  Nursing more independently as the night went on not requesting further assistance.  VSS and afebrile. Beginning to watch  and postpartum videos.  Stable.  Resume postpartum recovery process.

## 2017-04-19 NOTE — LACTATION NOTE
"This note was copied from a baby's chart.  S: \" I am not sure how things are going, he seems to have trouble staying on the breast, he is off and on, off and on and I am just not sure how breastfeeding is really going\".    O: Baby samantha Mendoza delivered via  at 37 wks gestation at 3232 gr, he is today at 3104 gr, he is at 94% of BW. Baby is feeding on demand. Baby is juandice.  This is mother's first baby, she has a hx of AMA and a goiter but reports her thyroid levels have remained normal.     Breast exam of the mother: tiny abrasion on the right nipple, left nipple \"sensitive\" per mother. Breasts average size soft. Colostrum dripping with massage.     Oral exam of the baby: ankyloglossia    A: Upon LC visit mother and baby were skin to skin, baby was in cc on the right side, latch had become shallow per mother. Mother demonstrated hand massage and expression and LC demonstrated how to spoon feed the 1 1/2 ml back to baby, father practiced. LC demonstrated how to use the feeding cup and encouraged parents to use the feeding cup over the next few days once she starts to collect greater volumes of colostrum. LC encouraged mother to start pumping at home three times a day (37 wks) and work with infant's provider about a feeding plan once baby has been seen by provider. LC provided education on the role of the tongue in breast feeding. LC witnessed mother using PERFECT technique in bringing baby to the breast. Baby obtains a good latch then comes off the breast after a few sucks, LC witnessed mother over and over using good technique but baby will not maintain a good latch.  LC provided contact for discharge.     P: Support the mother, assist with feedings as needed.   "

## 2017-04-19 NOTE — DISCHARGE SUMMARY
Post Partum Note    Sakshi Parish      MRN#: [id number]  Age: 36 year old      YOB: 1980      Post-partum day #2    SIGNIFICANT PROBLEMS:  Patient Active Problem List    Diagnosis Date Noted     Encounter for triage in pregnant patient 2017     Priority: Medium     Normal labor and delivery 2017     Priority: Medium     Need for Tdap vaccination 2017     Priority: Medium     TDAP GIVEN  [place] T[place] BENEDICT 2017           Family history of malignant melanoma of skin 2017     Priority: Medium     Family history of basal cell carcinoma 2017     Priority: Medium     Elderly multigravida in first trimester 10/27/2016     Priority: Medium     FOB -     First trimester screen - WNL  Dating ultrasound agrees with LMP dating    Offered MSAFP at NOB visit (18 weeks) - patient will discuss with  and ask at [facility] if she desires this part of screening           Screening for cervical cancer 2016     Priority: Medium     will complete it in follow up       Goiter 2015     Priority: Medium     CARDIOVASCULAR SCREENING; LDL GOAL LESS THAN 160 10/31/2010     Priority: Medium       INTERVAL HISTORY:  /65  Pulse 85  Temp 98  F (36.7  C) (Oral)  Resp 16  LMP 2016 (Approximate)  Breastfeeding? Unknown    Pt stable, baby is rooming in  Breast feeding status:initiated  Complications since 2 hours post delivery: None  Patient is tolerating acitivity well, voiding without difficulty, cramping is minimal and is relieved by Ibuprophen, lochia is decreasing and patient denies clots.  Perineal pain is is minimal and is relieved by Ibuprophen.  The perineum is intact    Normal postpartum exam     Postpartum hemoglobin   Hemoglobin   Date Value Ref Range Status   2017 12.0 11.7 - 15.7 g/dL Final     Blood type   Lab Results   Component Value Date    ABO A 2017       Lab Results   Component Value Date    RH  Pos 2017      Rubella Immune     ASSESSMENT/PLAN:  Stable Post-partum day #2  Complications:none  Plan d/c home today  RTC 6 weeks  Teaching done: Warning Signs/When to Call: Excessive Bleeding, Infection, PP Depression, RTC Clinic for PP Appointment and PNV    Postpartum warning s/s reviewed, including bleeding/clots, fever, mastitis, or depression    Birthcontrol planned:Did not discuss today  Current Discharge Medication List      START taking these medications    Details   senna (SENOKOT) 8.6 MG tablet Take 1 tablet by mouth daily  Qty: 100 tablet, Refills: 0    Associated Diagnoses: Routine postpartum follow-up      ibuprofen (ADVIL/MOTRIN) 600 MG tablet Take 1 tablet (600 mg) by mouth every 6 hours as needed for moderate pain  Qty: 100 tablet, Refills: 0    Associated Diagnoses: Routine postpartum follow-up         CONTINUE these medications which have NOT CHANGED    Details   VITAMIN D, CHOLECALCIFEROL, PO Take 1,000 Units by mouth daily      Prenatal Vit-Fe Fumarate-FA (PRENATAL VITAMIN PO)       fish oil-omega-3 fatty acids (FISH OIL) 1000 MG capsule Take 2 g by mouth daily      order for DME Equipment being ordered: Maternity support belt  Qty: 1 Device, Refills: 0    Associated Diagnoses: Back pain in pregnancy      fluticasone (FLONASE) 50 MCG/ACT nasal spray Spray 1-2 sprays into both nostrils daily  Qty: 1 Package, Refills: 2    Associated Diagnoses: Allergic rhinitis, cause unspecified         Neris Prado CNM

## 2017-04-19 NOTE — PLAN OF CARE
Problem: Goal Outcome Summary  Goal: Goal Outcome Summary  Outcome: Adequate for Discharge Date Met:  04/19/17  Pt discharged to home with baby. D/c instructions & prescriptions given/reviewed. ID bands double checked. Pt verbalized understanding her plan. Instructed to F/U at clinic within 6 weeks for PP check.

## 2017-06-02 ENCOUNTER — PRENATAL OFFICE VISIT (OUTPATIENT)
Dept: MIDWIFE SERVICES | Facility: CLINIC | Age: 37
End: 2017-06-02
Payer: COMMERCIAL

## 2017-06-02 VITALS
WEIGHT: 183.5 LBS | SYSTOLIC BLOOD PRESSURE: 108 MMHG | BODY MASS INDEX: 28.8 KG/M2 | HEART RATE: 82 BPM | DIASTOLIC BLOOD PRESSURE: 76 MMHG | HEIGHT: 67 IN

## 2017-06-02 DIAGNOSIS — Z30.017 NEXPLANON INSERTION: ICD-10-CM

## 2017-06-02 PROCEDURE — 99207 ZZC POST PARTUM EXAM: CPT | Performed by: ADVANCED PRACTICE MIDWIFE

## 2017-06-02 PROCEDURE — 11981 INSERTION DRUG DLVR IMPLANT: CPT | Performed by: ADVANCED PRACTICE MIDWIFE

## 2017-06-02 PROCEDURE — 87624 HPV HI-RISK TYP POOLED RSLT: CPT | Performed by: ADVANCED PRACTICE MIDWIFE

## 2017-06-02 PROCEDURE — G0145 SCR C/V CYTO,THINLAYER,RESCR: HCPCS | Performed by: ADVANCED PRACTICE MIDWIFE

## 2017-06-02 NOTE — MR AVS SNAPSHOT
"              After Visit Summary   6/2/2017    Sakshi Parish    MRN: [id number]           Patient Information     Date Of Birth          1980        Visit Information        Provider Department      6/2/2017 1:00 PM Neris Prado APRN CNM [facility] [place]        Today's Diagnoses     Routine postpartum follow-up    -  1    Nexplanon insertion           Follow-ups after your visit        Who to contact     If you have questions or need follow up information about today's clinic visit or your schedule please contact [facility] [place] directly at [phone].  Normal or non-critical lab and imaging results will be communicated to you by Agradishart, letter or phone within 4 business days after the clinic has received the results. If you do not hear from us within 7 days, please contact the clinic through Agradishart or phone. If you have a critical or abnormal lab result, we will notify you by phone as soon as possible.  Submit refill requests through Mirriad or call your pharmacy and they will forward the refill request to us. Please allow 3 business days for your refill to be completed.          Additional Information About Your Visit        MyChart Information     Mirriad gives you secure access to your electronic health record. If you see a primary care provider, you can also send messages to your care team and make appointments. If you have questions, please call your primary care clinic.  If you do not have a primary care provider, please call [phone] and they will assist you.        Care EveryWhere ID     This is your Care EveryWhere ID. This could be used by other organizations to access your [place] medical records  [id number]        Your Vitals Were     Pulse Height Breastfeeding? BMI (Body Mass Index)          82 5' 7\" (1.702 m) Yes 28.74 kg/m2         Blood Pressure from Last 3 Encounters:   06/02/17 108/76   04/19/17 117/65   04/12/17 130/78    Weight from Last 3 " Encounters:   06/02/17 183 lb 8 oz (83.2 kg)   04/12/17 202 lb 9.6 oz (91.9 kg)   04/06/17 200 lb 14.4 oz (91.1 kg)              We Performed the Following     HPV High Risk Types DNA Cervical     INSERTION NON-BIODEGRADABLE DRUG DELIVERY IMPLANT     Pap imaged thin layer screen with HPV - recommended age 30 - 65 years (select HPV order below)          Today's Medication Changes          These changes are accurate as of: 6/2/17  4:41 PM.  If you have any questions, ask your nurse or doctor.               Start taking these medicines.        Dose/Directions    etonogestrel 68 MG Impl   Commonly known as:  IMPLANON/NEXPLANON   Used for:  Nexplanon insertion   Started by:  Neris Prado APRN CNM        Dose:  1 each   1 each (68 mg) by Subdermal route once for 1 dose   Quantity:  1 each   Refills:  0            Where to get your medicines      Some of these will need a paper prescription and others can be bought over the counter.  Ask your nurse if you have questions.     You don't need a prescription for these medications     etonogestrel 68 MG Impl                Primary Care Provider Office Phone #    [facility] [phone]       No address on file        Thank you!     Thank you for [place] [facility] [place]. Our goal is always to provide you with excellent care. Hearing back from our patients is one way we can continue to improve our services. Please take a few minutes to complete the written survey that you may receive in the mail after your visit with us. Thank you!             Your Updated Medication List - Protect others around you: Learn how to safely use, store and throw away your medicines at www.disposemymeds.org.          This list is accurate as of: 6/2/17  4:41 PM.  Always use your most recent med list.                   Brand Name Dispense Instructions for use    etonogestrel 68 MG Impl    IMPLANON/NEXPLANON    1 each    1 each (68 mg) by Subdermal route once  for 1 dose       fish oil-omega-3 fatty acids 1000 MG capsule      Take 2 g by mouth daily       fluticasone 50 MCG/ACT spray    FLONASE    1 Package    Spray 1-2 sprays into both nostrils daily       ibuprofen 600 MG tablet    ADVIL/MOTRIN    100 tablet    Take 1 tablet (600 mg) by mouth every 6 hours as needed for moderate pain       order for DME     1 Device    Equipment being ordered: Maternity support belt       PRENATAL VITAMIN PO          VITAMIN D (CHOLECALCIFEROL) PO      Take 1,000 Units by mouth daily

## 2017-06-02 NOTE — PROGRESS NOTES
"SUBJECTIVE:   Sakshi Parish is here for her 6-week postpartum checkup.     HPI: Pt reports recovery is going well. No ongoing pain or discomfort. No concerns.     DELIVERY DATE: 17   of a viable boy, weight 7 pounds 2 oz., with no complications.  FEEDING METHOD:    CONTRACEPTION PLANNED: Nexplanon  She  has not had intercourse since delivery and complains of No discomfort.  HX OF DEPRESSION:No  HX OF ABUSE:No  OTHER HPI: She has no signs of infection, bleeding or other complications. Bleeding stopped, epis/lac healing well.         EXAM:  /76  Pulse 82  Ht 5' 7\" (1.702 m)  Wt 183 lb 8 oz (83.2 kg)  Breastfeeding? Yes  BMI 28.74 kg/m2  GENERAL APPEARANCE: healthy, alert and no distress  NECK: thyroid normal to palpation  BREAST: soft, nontender, nipples intact, lactating   ABDOMEN: soft, nontender, diastasis recti closing  MS: extremities normal- no gross deformities noted  PSYCH: mentation appears normal and affect normal/bright  PELVIC EXAM:  Vulva: BUS WNL, no lesions noted  Vagina: Discharge normal and physiologic, no lesions, well rugated, good tone  Cervix: Smooth, pink, no visible lesions, neg CMT  Uterus: Normal size and position, non-tender, mobile  Ovaries: No masses palpable, non-tender, mobile  Rectal exam: deferred    ASSESSMENT:   Normal postpartum exam after .    PLAN:  Birth Control as ordered. Fertility reviewed.   Return as needed or at time interval for next routine pap, pelvic, or breast exam.  Encourage Kegals and abdominal exercise. Slow, steady weight loss.  Continue a multi vitamin supplement, especially if breastfeeding.  Pap smear was obtained.  GC/CHLAMYDIA CULTURE OBTAINED:NO   Post partum Hgb was not obtained.    NEXPLANON INSERTION PROCEDURE    Sakshi Parish is a 36 year old  who presents for Nexplanon insertion. The  patient's last menstrual period was - na (she had a baby 6 weeks ago).  The patient is currently using " abstinence  for contraception.     Tests:  No results found for this or any previous visit (from the past 24 hour(s)).    A complete discussion of the risks and benefits of nexplanon use and the details of the insertion procedure was held with the patient.  All questions were answered.  A consent form was signed.      Prior to the beginning of the procedure the team paused to verify the patient's identity, as well as the procedure to be performed and the correct side/site.  All equipment required was ready and available. The patient was positioned appropriately.     Preprocedure medications: 1% plain lidocaine, 1-2 ml  Nexplanon Lot # [id number]    Patients allergies were confirmed.  The patient was placed in the supine position with her left (non-dominant) arm flexed at the elbow, externally rotated, and placed with her wrist parallel to her ear.  The insertion site was identified 6-8 cm above the elbow crease at the inner aspect overlying the bicepital groove.  The insertion site was marked with the end of the betadine swab.  The insertion area was cleaned with betadine swabs and anesthetized with 2 cc of 1% lidocaine without epinephrine. The Nexplanon was removed from its blister.  With the shield on, the jordan was visible inside the needle tip. The needle shield was removed.  Counter-traction was applied to the skin at the marked needle insertion site. The tip of the needle was inserted at the site at a slight angle. The applicator was then lowered to a horizontal position. The needle was inserted to its full length, keeping the needle parallel to the surface of the skin and the skin tented. The applicator button was pressed and the the needle retracted within the device leaving the Nexplanon jordan under the skin.  The 4 cm jordan was palpated under the skin. The patient also palpated the jordan. A pressure bandage was applied with sterile gauze. The patient was instructed to remove the bangage in several hours and replace  with a band-aid.    The user card was filled out and given to the patient to keep.  The Patient Chart Label was completed and sent for scanning.    PLAN:   The patient was asked to contact the clinic for any fever/chills/severe pelvic or abdominal pain or heavy bleeding.     FOLLOW-UP:  She was asked to follow up for any problems.     Neris Prado CNM

## 2017-06-02 NOTE — NURSING NOTE
"Chief Complaint   Patient presents with     Post Partum Exam     post ob     Contraception     Nexplanon insertion, NDC: 2769-4762-01, LOT: [id number], EXP: 2018       Initial /76  Pulse 82  Ht 5' 7\" (1.702 m)  Wt 183 lb 8 oz (83.2 kg)  Breastfeeding? Yes  BMI 28.74 kg/m2 Estimated body mass index is 28.74 kg/(m^2) as calculated from the following:    Height as of this encounter: 5' 7\" (1.702 m).    Weight as of this encounter: 183 lb 8 oz (83.2 kg).  BP completed using cuff size: regular        The following HM Due: pap smear      The following patient reported/Care Every where data was sent to:  P ABSTRACT QUALITY INITIATIVES [[id number]]       patient has appointment for today  Nivia Carcamo               "

## 2017-06-03 ASSESSMENT — PATIENT HEALTH QUESTIONNAIRE - PHQ9: SUM OF ALL RESPONSES TO PHQ QUESTIONS 1-9: 2

## 2017-06-06 LAB
COPATH REPORT: NORMAL
PAP: NORMAL

## 2017-06-08 LAB
FINAL DIAGNOSIS: NORMAL
HPV HR 12 DNA CVX QL NAA+PROBE: NEGATIVE
HPV16 DNA SPEC QL NAA+PROBE: NEGATIVE
HPV18 DNA SPEC QL NAA+PROBE: NEGATIVE
SPECIMEN DESCRIPTION: NORMAL

## 2019-09-09 ENCOUNTER — OFFICE VISIT (OUTPATIENT)
Dept: MIDWIFE SERVICES | Facility: CLINIC | Age: 39
End: 2019-09-09
Payer: COMMERCIAL

## 2019-09-09 VITALS
HEART RATE: 81 BPM | BODY MASS INDEX: 30.54 KG/M2 | SYSTOLIC BLOOD PRESSURE: 122 MMHG | DIASTOLIC BLOOD PRESSURE: 70 MMHG | WEIGHT: 195 LBS

## 2019-09-09 DIAGNOSIS — Z00.00 WELL WOMAN EXAM WITHOUT GYNECOLOGICAL EXAM: Primary | ICD-10-CM

## 2019-09-09 PROCEDURE — 11982 REMOVE DRUG IMPLANT DEVICE: CPT | Performed by: ADVANCED PRACTICE MIDWIFE

## 2019-09-09 PROCEDURE — 99395 PREV VISIT EST AGE 18-39: CPT | Mod: 25 | Performed by: ADVANCED PRACTICE MIDWIFE

## 2019-09-09 NOTE — PROGRESS NOTES
Sakshi is a 38 year old  female who presents for annual exam.     Menses are irregular and normal and crampy lasting 5-7 days.  Menses flow: normal.  Patient's last menstrual period was 2019.. Using none for contraception.  She is currently considering pregnancy.  Besides routine health maintenance,  she would like to discuss getting pregnant. Would like Nexplanon removed today in anticipation of achieving pregnant in the near future. She is already and has been taking a prenatal vitamin since her previous pregnancy.   GYNECOLOGIC HISTORY:  Menarche: 13  Age at first intercourse: 18 Number of lifetime partners: >6  Sakshi is sexually active with male partner(s) and is currently in monogamous relationship.    History sexually transmitted infections:No STD history  STI testing offered?  Declined  CORONA exposure: No  History of abnormal Pap smear: NO - age 30- 65 PAP every 3 years recommended  Family history of breast CA: No  Family history of uterine/ovarian CA: Yes (Please explain): mat gma. Mat aunt    Family history of colon CA: No    HEALTH MAINTENANCE:  Cholesterol:   Cholesterol   Date Value Ref Range Status   2015 187 <200 mg/dL Final     Comment:     LDL Cholesterol is the primary guide to therapy.   The NCEP recommends further evaluation of: patients with cholesterol greater   than 200 mg/dL if additional risk factors are present, cholesterol greater   than   240 mg/dL, triglycerides greater than 150 mg/dL, or HDL less than 40 mg/dL.     2014 164 <200 mg/dL Final     Comment:     LDL Cholesterol is the primary guide to therapy.   The NCEP recommends further evaluation of: patients with cholesterol greater   than 200 mg/dL if additional risk factors are present, cholesterol greater   than   240 mg/dL, triglycerides greater than 150 mg/dL, or HDL less than 40 mg/dL.    History of abnormal lipids: No  Mammo: NA . History of abnormal Mammo: Not applicable.  Regular Self Breast Exams:  occ  Calcium/Vitamin D intake: source:  dairy Adequate? Yes  TSH:   TSH   Date Value Ref Range Status   2016 0.74 0.40 - 4.00 mU/L Final     Lab Results   Component Value Date    PAP NIL 2017    PAP NIL 2013    PAP NIL 2012       HISTORY:  OB History    Para Term  AB Living   1 1 1 0 0 1   SAB TAB Ectopic Multiple Live Births   0 0 0 0 1      # Outcome Date GA Lbr Santiago/2nd Weight Sex Delivery Anes PTL Lv   1 Term 17 37w4d 18:15 / 00:45 3.232 kg (7 lb 2 oz) M Vag-Spont Nitrous N ANASTASIA      Complications: Prolonged PROM (>18 hours)      Name: JAYLAN MOYER      Apgar1: 8  Apgar5: 9     Past Medical History:   Diagnosis Date     ADD (attention deficit disorder)      Family history of malignant melanoma of skin 2/3/2017     Goiter 2015     Past Surgical History:   Procedure Laterality Date     CHOLECYSTECTOMY       Family History   Problem Relation Age of Onset     Hypertension Maternal Grandfather      Cerebrovascular Disease Maternal Grandfather      Hypertension Paternal Grandfather      Ovarian Cancer Maternal Grandmother              Skin Cancer Mother      Hypothyroidism Mother      Family History Negative Father      Ovarian Cancer Maternal Aunt         66-     Skin Cancer Maternal Aunt      Cancer - colorectal No family hx of      Breast Cancer No family hx of      Social History     Socioeconomic History     Marital status:      Spouse name: Landon     Number of children: 0     Years of education: Not on file     Highest education level: Not on file   Occupational History     Occupation: IT     Employer: [employer]   Social Needs     Financial resource strain: Not on file     Food insecurity:     Worry: Not on file     Inability: Not on file     Transportation needs:     Medical: Not on file     Non-medical: Not on file   Tobacco Use     Smoking status: Never Smoker     Smokeless tobacco: Never Used   Substance and Sexual Activity      Alcohol use: Yes     Alcohol/week: 0.0 oz     Comment: occ     Drug use: No     Sexual activity: Yes     Partners: Male   Lifestyle     Physical activity:     Days per week: Not on file     Minutes per session: Not on file     Stress: Not on file   Relationships     Social connections:     Talks on phone: Not on file     Gets together: Not on file     Attends Gnosticist service: Not on file     Active member of club or organization: Not on file     Attends meetings of clubs or organizations: Not on file     Relationship status: Not on file     Intimate partner violence:     Fear of current or ex partner: Not on file     Emotionally abused: Not on file     Physically abused: Not on file     Forced sexual activity: Not on file   Other Topics Concern     Parent/sibling w/ CABG, MI or angioplasty before 65F 55M? No   Social History Narrative     Not on file       Current Outpatient Medications:      etonogestrel (IMPLANON/NEXPLANON) 68 MG IMPL, 1 each (68 mg) by Subdermal route once for 1 dose, Disp: 1 each, Rfl: 0     fish oil-omega-3 fatty acids (FISH OIL) 1000 MG capsule, Take 2 g by mouth daily, Disp: , Rfl:      fluticasone (FLONASE) 50 MCG/ACT nasal spray, Spray 1-2 sprays into both nostrils daily, Disp: 1 Package, Rfl: 2     Prenatal Vit-Fe Fumarate-FA (PRENATAL VITAMIN PO), , Disp: , Rfl:      VITAMIN D, CHOLECALCIFEROL, PO, Take 1,000 Units by mouth daily, Disp: , Rfl:    No Known Allergies    Past medical, surgical, social and family history were reviewed and updated in EPIC.    ROS:   C:     NEGATIVE for fever, chills, change in weight  I:       NEGATIVE for worrisome rashes, moles or lesions  E:     NEGATIVE for vision changes or irritation  E/M: NEGATIVE for ear, mouth and throat problems  R:     NEGATIVE for significant cough or SOB  CV:   NEGATIVE for chest pain, palpitations or peripheral edema  GI:     NEGATIVE for nausea, abdominal pain, heartburn, or change in bowel habits  :   NEGATIVE for  frequency, dysuria, hematuria, vaginal discharge, or irregular bleeding  M:     NEGATIVE for significant arthralgias or myalgia  N:      NEGATIVE for weakness, dizziness or paresthesias  E:      NEGATIVE for temperature intolerance, skin/hair changes  P:      NEGATIVE for changes in mood or affect.    EXAM:  /70   Pulse 81   Wt 88.5 kg (195 lb)   LMP 07/21/2019   BMI 30.54 kg/m     BMI: Body mass index is 30.54 kg/m .  Constitutional: healthy, alert and no distress  Head: Normocephalic. No masses, lesions, tenderness or abnormalities  Neck: Neck supple. Trachea midline. No adenopathy. Thyroid symmetric, normal size.   Cardiovascular: RRR.   Respiratory: Negative.   Breast: Breasts reveal mild symmetric fibrocystic densities, but there are no dominant, discrete, fixed or suspicious masses found.  Gastrointestinal: Abdomen soft, non-tender, non-distended. No masses, organomegaly.  Musculoskeletal: extremities normal  Skin: no suspicious lesions or rashes  Psychiatric: Affect appropriate, cooperative,mentation appears normal.     COUNSELING:        Regular exercise       Healthy diet/nutrition       Family planning       Folic Acid Counseling   reports that she has never smoked. She has never used smokeless tobacco.    Body mass index is 30.54 kg/m .    Nexplanon Removal:   Is a pregnancy test required: No.  Was a consent obtained?  Yes    Sakshi Samuel Марина is here for removal of etonogestrel implant Nexplanon/Implanon    Indication: Would like to get pregnant      Preoperative Diagnosis: etonogestrel implant  Postoperative Diagnosis: etonogestrel implant removed    Technique: On the left arm  Skin prep Betadine  Anesthesia 1% lidocaine  Procedure: Small incision (<5mm) was made at distal end of palpable implant, curved hemostat or mosquito forceps was used to isolate the implant and bring it to the incision, the fibrous capsule containing the implant  was incised and the Implant was removed  intact.    EBL: minimal  Complications:  No  Tolerance:  Pt tolerated procedure well and was in stable condition.   Dressing:    A pressure bandage was placed for the next 12-24 hours.    Contraception was discussed and patient chose the following method none    ASSESSMENT/PLAN:  38 year old female with satisfactory annual exam  (Z30.432) Encounter for removal of intrauterine contraceptive device  (primary encounter diagnosis)  Comment:   Plan: REMOVAL NEXPLANON         Follow up: Pt was instructed to call if bleeding, severe pain or foul smell.  Reviewed AMA monitoring in pregnancy, pt will be 39 next week. Understands there is additional monitoring that occurs if a woman will be 40 during her pregnancy.    RTC with +UPT for RN teaching visit around 8 weeks gestation and CNM visit at 10 weeks gestation.   YUMIKO DavalosM

## 2019-10-23 ENCOUNTER — ALLIED HEALTH/NURSE VISIT (OUTPATIENT)
Dept: NURSING | Facility: CLINIC | Age: 39
End: 2019-10-23
Payer: COMMERCIAL

## 2019-10-23 DIAGNOSIS — Z23 NEED FOR PROPHYLACTIC VACCINATION AND INOCULATION AGAINST INFLUENZA: Primary | ICD-10-CM

## 2019-10-23 PROCEDURE — 90471 IMMUNIZATION ADMIN: CPT

## 2019-10-23 PROCEDURE — 99207 ZZC NO CHARGE NURSE ONLY: CPT

## 2019-10-23 PROCEDURE — 90686 IIV4 VACC NO PRSV 0.5 ML IM: CPT

## 2020-02-04 ENCOUNTER — OFFICE VISIT (OUTPATIENT)
Dept: FAMILY MEDICINE | Facility: CLINIC | Age: 40
End: 2020-02-04
Payer: COMMERCIAL

## 2020-02-04 VITALS
OXYGEN SATURATION: 95 % | TEMPERATURE: 99.1 F | WEIGHT: 193 LBS | DIASTOLIC BLOOD PRESSURE: 76 MMHG | HEIGHT: 67 IN | SYSTOLIC BLOOD PRESSURE: 110 MMHG | BODY MASS INDEX: 30.29 KG/M2 | HEART RATE: 114 BPM

## 2020-02-04 DIAGNOSIS — J02.9 ACUTE PHARYNGITIS, UNSPECIFIED ETIOLOGY: ICD-10-CM

## 2020-02-04 DIAGNOSIS — J02.9 SORE THROAT: Primary | ICD-10-CM

## 2020-02-04 LAB
DEPRECATED S PYO AG THROAT QL EIA: NORMAL
SPECIMEN SOURCE: NORMAL

## 2020-02-04 PROCEDURE — 99203 OFFICE O/P NEW LOW 30 MIN: CPT | Performed by: FAMILY MEDICINE

## 2020-02-04 PROCEDURE — 87081 CULTURE SCREEN ONLY: CPT | Performed by: FAMILY MEDICINE

## 2020-02-04 PROCEDURE — 87880 STREP A ASSAY W/OPTIC: CPT | Performed by: FAMILY MEDICINE

## 2020-02-04 ASSESSMENT — MIFFLIN-ST. JEOR: SCORE: 1583.07

## 2020-02-04 NOTE — PROGRESS NOTES
Subjective     Sakshi Parish is a 39 year old female who presents to clinic today for the following health issues:    HPI   Acute Illness   Acute illness concerns: sore throat, cough, headache  Onset: started     Fever: no    Chills/Sweats: YES, felt warm but no fevers    Headache (location?): YES    Sinus Pressure:no    Conjunctivitis:  no    Ear Pain: no    Rhinorrhea: YES    Congestion: YES    Sore Throat: YES     Cough: yes    Wheeze: no    Decreased Appetite: yes    Nausea: yes, slight    Vomiting: no    Diarrhea:  no    Dysuria/Freq.: no    Fatigue/Achiness: YES, very achy    Sick/Strep Exposure: YES, a couple of people around her at work have had strep     Therapies Tried and outcome:         Patient Active Problem List   Diagnosis     CARDIOVASCULAR SCREENING; LDL GOAL LESS THAN 160     Goiter     Screening for cervical cancer     Family history of malignant melanoma of skin     Family history of basal cell carcinoma     Need for Tdap vaccination     Encounter for triage in pregnant patient     Normal labor and delivery     Past Surgical History:   Procedure Laterality Date     CHOLECYSTECTOMY         Social History     Tobacco Use     Smoking status: Never Smoker     Smokeless tobacco: Never Used   Substance Use Topics     Alcohol use: Yes     Alcohol/week: 0.0 standard drinks     Comment: occ     Family History   Problem Relation Age of Onset     Hypertension Maternal Grandfather      Cerebrovascular Disease Maternal Grandfather      Hypertension Paternal Grandfather      Ovarian Cancer Maternal Grandmother              Skin Cancer Mother      Hypothyroidism Mother      Family History Negative Father      Ovarian Cancer Maternal Aunt         66-     Skin Cancer Maternal Aunt      Cancer - colorectal No family hx of      Breast Cancer No family hx of          Current Outpatient Medications   Medication Sig Dispense Refill     amoxicillin-clavulanate (AUGMENTIN) 096-336  "MG tablet Take 1 tablet by mouth 2 times daily for 10 days 20 tablet 0     fish oil-omega-3 fatty acids (FISH OIL) 1000 MG capsule Take 2 g by mouth daily       Prenatal Vit-Fe Fumarate-FA (PRENATAL VITAMIN PO)        VITAMIN D, CHOLECALCIFEROL, PO Take 1,000 Units by mouth daily       No Known Allergies      Reviewed and updated as needed this visit by Provider         Review of Systems   ROS COMP: INTEGUMENTARY/SKIN: NEGATIVE for worrisome rashes, moles or lesions  GI: NEGATIVE for abdominal pain, heartburn, or change in bowel habits      Objective    /76 (BP Location: Right arm, Cuff Size: Adult Regular)   Pulse 114   Temp 99.1  F (37.3  C) (Tympanic)   Ht 1.702 m (5' 7\")   Wt 87.5 kg (193 lb)   SpO2 95%   BMI 30.23 kg/m    Body mass index is 30.23 kg/m .  Physical Exam   GENERAL: healthy, alert and no distress  EYES: Eyes grossly normal to inspection, PERRL and conjunctivae and sclerae normal  HENT: ear canals and TM's normal, nose is dry.  No sinus tenderness.  Mouth: Bilateral posterior pharyngeal erythema with swelling over the tonsils and exudates bilaterally noted.  NECK: no bilateral mildly tender submandibular adenopathy  RESP: lungs clear to auscultation - no rales, rhonchi or wheezes  CV: regular rate and rhythm, normal S1 S2, no S3 or S4, no murmur, click or rub, no peripheral edema and peripheral pulses strong  ABDOMEN: soft, nontender, no hepatosplenomegaly, no masses and bowel sounds normal          Results for orders placed or performed in visit on 02/04/20   Rapid strep screen     Status: None   Result Value Ref Range    Specimen Description Throat     Rapid Strep A Screen       NEGATIVE: No Group A streptococcal antigen detected by immunoassay, await culture report.         Assessment & Plan     1. Sore throat  Streptococcal testing came back negative.  Clinical suspicion for bacterial pharyngitis is high.  Starting patient on antibiotic.  See below.  - Rapid strep screen  - Beta " "strep group A culture    2. Acute pharyngitis, unspecified etiology    - amoxicillin-clavulanate (AUGMENTIN) 875-125 MG tablet; Take 1 tablet by mouth 2 times daily for 10 days  Dispense: 20 tablet; Refill: 0  Instructed to stay hydrated.  Use ibuprofen for pain inflammation control.     BMI:   Estimated body mass index is 30.23 kg/m  as calculated from the following:    Height as of this encounter: 1.702 m (5' 7\").    Weight as of this encounter: 87.5 kg (193 lb).     Sabine Alaniz MD  [facility] [place]        "

## 2020-02-05 LAB
BACTERIA SPEC CULT: NORMAL
SPECIMEN SOURCE: NORMAL

## 2020-03-19 ENCOUNTER — PRENATAL OFFICE VISIT (OUTPATIENT)
Dept: NURSING | Facility: CLINIC | Age: 40
End: 2020-03-19
Payer: COMMERCIAL

## 2020-03-19 ENCOUNTER — TRANSCRIBE ORDERS (OUTPATIENT)
Dept: MATERNAL FETAL MEDICINE | Facility: CLINIC | Age: 40
End: 2020-03-19

## 2020-03-19 VITALS — BODY MASS INDEX: 28.25 KG/M2 | WEIGHT: 180 LBS | HEIGHT: 67 IN

## 2020-03-19 DIAGNOSIS — Z23 NEED FOR TDAP VACCINATION: ICD-10-CM

## 2020-03-19 DIAGNOSIS — O09.529 ELDERLY MULTIGRAVIDA, CURRENTLY PREGNANT: Primary | ICD-10-CM

## 2020-03-19 DIAGNOSIS — O26.90 PREGNANCY RELATED CONDITION, ANTEPARTUM: Primary | ICD-10-CM

## 2020-03-19 PROCEDURE — 99207 ZZC NO CHARGE NURSE ONLY: CPT

## 2020-03-19 SDOH — HEALTH STABILITY: MENTAL HEALTH
STRESS IS WHEN SOMEONE FEELS TENSE, NERVOUS, ANXIOUS, OR CAN'T SLEEP AT NIGHT BECAUSE THEIR MIND IS TROUBLED. HOW STRESSED ARE YOU?: NOT AT ALL

## 2020-03-19 SDOH — SOCIAL STABILITY: SOCIAL NETWORK: ARE YOU MARRIED, WIDOWED, DIVORCED, SEPARATED, NEVER MARRIED, OR LIVING WITH A PARTNER?: MARRIED

## 2020-03-19 SDOH — SOCIAL STABILITY: SOCIAL INSECURITY: WITHIN THE LAST YEAR, HAVE YOU BEEN HUMILIATED OR EMOTIONALLY ABUSED IN OTHER WAYS BY YOUR PARTNER OR EX-PARTNER?: NO

## 2020-03-19 SDOH — SOCIAL STABILITY: SOCIAL INSECURITY: WITHIN THE LAST YEAR, HAVE YOU BEEN AFRAID OF YOUR PARTNER OR EX-PARTNER?: NO

## 2020-03-19 SDOH — SOCIAL STABILITY: SOCIAL INSECURITY
WITHIN THE LAST YEAR, HAVE YOU BEEN KICKED, HIT, SLAPPED, OR OTHERWISE PHYSICALLY HURT BY YOUR PARTNER OR EX-PARTNER?: NO

## 2020-03-19 SDOH — SOCIAL STABILITY: SOCIAL INSECURITY
WITHIN THE LAST YEAR, HAVE TO BEEN RAPED OR FORCED TO HAVE ANY KIND OF SEXUAL ACTIVITY BY YOUR PARTNER OR EX-PARTNER?: NO

## 2020-03-19 ASSESSMENT — MIFFLIN-ST. JEOR: SCORE: 1524.1

## 2020-03-19 NOTE — PROGRESS NOTES
Important Information for Provider:     Intake over the phone, updated history. Patient's second pregnancy, AMA. Ordered NIPT. Handouts reviewed. Ultrasound and NOB scheduled for 4/03/2020. Future labs, TSH ordered. Strong family history of thyroid disease. Patient states that she was diagnosis with goiter/ enlarged thyroid gland at [facility] 10/07/2016, TSH levels were normal at that time      Caffeine intake/servings daily - 1  Calcium intake/servings daily - 3  Exercise 5 times weekly - describe ; walks dog, precautions given  Sunscreen used - Yes  Seatbelts used - Yes  Guns stored in the home - No  Self Breast Exam - Yes  Pap test up to date -  Yes  Eye exam up to date -  Yes  Dental exam up to date -  Yes    Immunizations reviewed and up to date - Yes  Abuse: Current or Past (Physical, Sexual or Emotional) - No  Do you feel safe in your environment - Yes  Do you cope well with stress - Yes  Do you suffer from insomnia - No         Prenatal OB Questionnaire  Patient supplied answers from flow sheet for:  Prenatal OB Questionnaire.  Past Medical History  Diabetes?: No  Hypertension : No  Heart disease, mitral valve prolapse or rheumatic fever?: No  An autoimmune disease such as lupus or rheumatoid arthritis?: No  Kidney disease or urinary tract infection?: No  Epilepsy, seizures or spells?: No  Migraine headaches?: No  A stroke or loss of function or sensation?: No  Any other neurological problems?: No  Have you ever been treated for depression?: No  Are you having problems with crying spells or loss of self-esteem?: No  Have you ever required psychiatric care?: No  Have you ever had hepatitis, liver disease or jaundice?: No  Have you been treated for blood clots in your veins, deep vein thrombosis, inflammation in the veins, thrombosis, phlebitis, pulmonary embolism or varicosities?: No  Have you had excessive bleeding after surgery or dental work?: No  Do you bleed more than other women after a cut or scratch?:  No  Do you have a history of anemia?: No  Have you ever had thyroid problems or taken thyroid medication?: No   Do you have any endocrine problems?: (!) Yes(goiter), enlarged thyroid 10/07/2016  Have you ever been in a major accident or suffered serious trauma?: No  Within the last year, has anyone hit, slapped, kicked or otherwise hurt you?: No  In the last year, has anyone forced you to have sex when you didn't want to?: No    Past Medical History 2   Have you ever received a blood transfusion?: No  Would you refuse a blood transfusion if a doctor judged it to be medically necessary?: No   If you answered Yes, would you rather die than receive a blood transfusion?: No  If you answered Yes, is this for Taoist reasons?: No  Does anyone in your home smoke?: No  Do you use tobacco products?: No  Do you drink beer, wine or hard liquor?: No  Do you use any of the following: marijuana, speed, cocaine, heroin, hallucinogens or other drugs?: No   Is your blood type Rh negative?: No  Have you ever had abnormal antibodies in your blood?: No  Have you ever had asthma?: No  Have you ever had tuberculosis?: No  Do you have any allergies to drugs or over-the-counter medications?: No  Allergies: Dust Mites, Aspartame, Ethanol, Venlafaxine, Hydrochloride, Sertraline: No  Have you had any breast problems?: No  Have you ever ?: (!) Yes  Have you had any gynecological surgical procedures such as cervical conization, a LEEP procedure, laser treatment, cryosurgery of the cervix or a dilation and curettage, etc?: No  Have you ever had any other surgical procedures?: No  Have you been hospitalized for a nonsurgical reason excluding normal delivery?: No  Have you ever had any anesthetic complications?: No  Have you ever had an abnormal pap smear?: No    Past Medical History (Continued)  Do you have a history of abnormalities of the uterus?: No  Did your mother take CORONA or any other hormones when she was pregnant with you?:  No  Did it take you more than a year to become pregnant?: No  Have you ever been evaluated or treated for infertility?: No  Is there a history of medical problems in your family, which you feel may be important to this pregnancy?: No  Do you have any other problems we have not asked about which you feel may be important to this pregnancy?: No    Symptoms since last menstrual period  Do you have any of the following symptoms: abdominal pain, blood in stools or urine, chest pain, shortness of breath, coughing or vomiting up blood, your heart racing or skipping beats, nausea and vomiting, pain on urination or vaginal discharge or bleed: (!) Yes  Will the patient be 35 years old or older at the time of delivery?: (!) Yes    Has the patient, baby's father or anyone in either family had:  Thalassemia (Italian, Greek, Mediterranean or  background only) and an MCV result less than 80?: No  Neural tube defect such as meningomyelocele, spina bifida or anencephaly?: No  Congenital heart defect?: No  Down's Syndrome?: No  Bob-Sachs disease (Baptist, Cajun, Belarusian-[place])?: No  Sickle cell disease or trait ()?: No  Hemophilia or other inherited problems of blood?: No  Muscular dystrophy?: No  Cystic fibrosis?: No  Roxy's chorea?: No  Mental retardation/autism?: No  If yes, was the person tested for fragile X?: No  Any other inherited genetic or chromosomal disorder?: No  Maternal metabolic disorder (e.g Insulin-dependent diabetes, PKU)?: No  A child with birth defects not listed above?: patient's maternal cousin(child) diagnosis with dwarfism  Recurrent pregnancy loss or stillbirth?: No   Has the patient had any medications/street drugs/alcohol since her last menstrual period?: No  Does the patient or baby's father have any other genetic risks?: No    Infection History   Do you object to being tested for Hepatitis B?: No  Do you object to being tested for HIV?: No   Do you feel that you are at high risk for  coming in contact with the AIDS virus?: No  Have you ever been treated for tuberculosis?: No  Have you ever had a positive skin test for tuberculosis?: No  Do you live with someone who has tuberculosis?: No  Have you ever been exposed to tuberculosis?: No  Do you have genital herpes?: No  Does your partner have genital herpes?: No  Have you had a viral illness since your last period?: No  Have you ever had gonorrhea, chlamydia, syphilis, venereal warts, trichomoniasis, pelvic inflammatory disease or any other sexually transmitted disease?: No  Do you know if you are a genital group B streptococcus carrier?: No  Have you had chicken pox/varicella?: (!) Yes   Have you been vaccinated against chicken Pox?: No  Have you had any other infectious diseases?: No      Allergies as of 3/19/2020:    Allergies as of 03/19/2020     (No Known Allergies)       Current medications are:  Current Outpatient Medications   Medication Sig Dispense Refill     fish oil-omega-3 fatty acids (FISH OIL) 1000 MG capsule Take 2 g by mouth daily       Prenatal Vit-Fe Fumarate-FA (PRENATAL VITAMIN PO)        VITAMIN D, CHOLECALCIFEROL, PO Take 1,000 Units by mouth daily           Early ultrasound screening tool:    Does patient have irregular periods?  No  Did patient use hormonal birth control in the three months prior to positive urine pregnancy test? No  Is the patient breastfeeding?  No  Is the patient 10 weeks or greater at time of education visit?  No

## 2020-04-02 ENCOUNTER — TELEPHONE (OUTPATIENT)
Dept: MIDWIFE SERVICES | Facility: CLINIC | Age: 40
End: 2020-04-02

## 2020-04-02 ENCOUNTER — NURSE TRIAGE (OUTPATIENT)
Dept: NURSING | Facility: CLINIC | Age: 40
End: 2020-04-02

## 2020-04-02 NOTE — TELEPHONE ENCOUNTER
Sakshi calls with concerns of dry cough present for over a week. She has a scheduled prenatal ultrasound and clinic visit tomorrow, 4/3/20, and was wondering if she should keep the appointment. FNA advised to call clinic in AM.    No fever. Temperature is 99F. Has post-nasal drip, suspects that she may have allergies. Cough is worse at night.    Per protocol, advised home care. Care advice reviewed, use cough drops (avoid Zinc) and intake of honey. Patient verbalizes understanding. Advised to call back with further questions/concerns.     Mini Lovett RN/[place] Nurse Advisor    COVID 19 Nurse Triage Plan/Patient Instructions    Please be aware that novel coronavirus (COVID-19) may be circulating in the community. If you develop symptoms such as fever, cough, or SOB or if you have concerns about the presence of another infection including coronavirus (COVID-19), please contact your health care provider or visit [email or web address].     Disposition/Instructions    Patient to stay at home and follow home care protocol based instructions.     Thank you for limiting contact with others, wearing a simple mask to cover your cough, practice good hand hygiene habits and accessing our virtual services where possible to limit the spread of this virus.    For more information about COVID19 and options for caring for yourself at home, please visit the CDC website at https://www.cdc.gov/coronavirus/2019-ncov/about/steps-when-sick.html  For more options for care at [facility], please visit our website at [email or web address]    For more information, please use the [place] Department of Health ([facility]) COVID-19 Hotlines (Interpreters available):     Health questions: Phone Number: [phone] or [phone] and Hours: 7 a.m. to 7 p.m.    Schools and  questions: Phone Number: [phone] or [phone] and Hours 7 a.m. to 7 p.m.      Mini Lovett RN/[place] Nurse  Advisor        Additional Information    Negative: Severe difficulty breathing (e.g., struggling for each breath, speaks in single words)    Negative: Bluish (or gray) lips or face now    Negative: [1] Rapid onset of cough AND [2] has hives    Negative: Coughing started suddenly after medicine, an allergic food or bee sting    Negative: [1] Difficulty breathing AND [2] exposure to flames, smoke, or fumes    Negative: [1] Stridor AND [2] difficulty breathing    Negative: Sounds like a life-threatening emergency to the triager    Negative: Chest pain  (Exception: MILD central chest pain, present only when coughing)    Negative: Difficulty breathing    Negative: Patient sounds very sick or weak to the triager    Negative: [1] Coughed up blood AND [2] > 1 tablespoon (15 ml) (Exception: blood-tinged sputum)    Negative: Fever > 103 F (39.4 C)    Negative: [1] Fever > 101 F (38.3 C) AND [2] age > 60    Negative: [1] Fever > 100.0 F (37.8 C) AND [2] bedridden (e.g., nursing home patient, CVA, chronic illness, recovering from surgery)    Negative: [1] Fever > 100.0 F (37.8 C) AND [2] diabetes mellitus or weak immune system (e.g., HIV positive, cancer chemo, splenectomy, chronic steroids)    Negative: Wheezing is present    Negative: [1] Ankle swelling AND [2] swelling is increasing    Negative: SEVERE coughing spells (e.g., whooping sound after coughing, vomiting after coughing)    Negative: [1] Continuous (nonstop) coughing interferes with work or school AND [2] no improvement using cough treatment per protocol    Negative: Fever present > 3 days (72 hours)    Negative: [1] Fever returns after gone for over 24 hours AND [2] symptoms worse or not improved    Negative: [1] Using nasal washes and pain medicine > 24 hours AND [2] sinus pain (around cheekbone or eye) persists    Negative: Earache is present    Negative: Cough has been present for > 3 weeks    Negative: [1] Nasal discharge AND [2] present > 10 days    Negative:  [1] Coughed up blood-tinged sputum AND [2] more than once    Negative: [1] Patient also has allergy symptoms (e.g., itchy eyes, clear nasal discharge, postnasal drip) AND [2] they are acting up    Negative: Taking an ACE Inhibitor medication  (e.g., benazepril/LOTENSIN, captopril/CAPOTEN, enalapril/VASOTEC, lisinopril/ZESTRIL)    Negative: Exposure to TB (Tuberculosis)    Negative: Cough    Cough with cold symptoms (e.g., runny nose, postnasal drip, throat clearing)    Protocols used: COUGH - ACUTE NON-PRODUCTIVE-A-AH

## 2020-04-02 NOTE — TELEPHONE ENCOUNTER
Clinic Action Needed: [facility] Triage Call  Presenting Problem:    Sakshi calls with concerns of dry cough present for over a week. She has a scheduled prenatal ultrasound and clinic visit tomorrow, 4/3/20, and was wondering if she should keep the appointment. PARAMJIT advised to call clinic in AM.    No fever. Temperature is 99F. Has post-nasal drip, suspects that she may have allergies. Cough is worse at night.    Per protocol, advised home care. Care advice reviewed, use cough drops (avoid Zinc) and intake of honey. Patient verbalizes understanding. Advised to call back with further questions/concerns.     Mini Lovett RN/[place] Nurse Advisor    COVID 19 Nurse Triage Plan/Patient Instructions    Please be aware that novel coronavirus (COVID-19) may be circulating in the community. If you develop symptoms such as fever, cough, or SOB or if you have concerns about the presence of another infection including coronavirus (COVID-19), please contact your health care provider or visit [email or web address].     Disposition/Instructions    Patient to stay at home and follow home care protocol based instructions.     Thank you for limiting contact with others, wearing a simple mask to cover your cough, practice good hand hygiene habits and accessing our virtual services where possible to limit the spread of this virus.    For more information about COVID19 and options for caring for yourself at home, please visit the CDC website at https://www.cdc.gov/coronavirus/2019-ncov/about/steps-when-sick.html  For more options for care at [facility], please visit our website at [email or web address]    For more information, please use the [place] Department of Health ([facility]) COVID-19 Hotlines (Interpreters available):     Health questions: Phone Number: [phone] or [phone] and Hours: 7 a.m. to 7 p.m.    Schools and  questions: Phone Number: [phone] or [phone] and Hours 7  a.m. to 7 p.m.      Mini Lovett RN/[place] Nurse Advisor    Guideline Used: COUGH - ACUTE NON-PRODUCTIVE-A-AH    Routed to: RN Pool    Please be sure to close this encounter once this patient's issue/question has been addressed.

## 2020-04-03 ENCOUNTER — VIRTUAL VISIT (OUTPATIENT)
Dept: FAMILY MEDICINE | Facility: OTHER | Age: 40
End: 2020-04-03

## 2020-04-03 NOTE — PROGRESS NOTES
"Date: 2020 10:13:42  Clinician: Lucy Villegas  Clinician NPI: [id number]  Patient: Sakshi Parish  Patient : 1980  Patient Address: [address], [place] [address]  Patient Phone: [phone]  Visit Protocol: URI  Patient Summary:  Sakshi is a 39 year old ( : 1980 ) female who initiated a Visit for COVID-19 (Coronavirus) evaluation and screening. When asked the question \"Please sign me up to receive news, health information and promotions from [employer].\", Sakshi responded \"Yes\".    Sakshi states her symptoms started gradually 3-6 days ago.   Her symptoms consist of nausea, a cough, malaise, and rhinitis. She is experiencing difficulty breathing due to nasal congestion but she is not short of breath.   Symptom details     Nasal secretions: The color of her mucus is clear.    Cough: Sakshi coughs a few times an hour and her cough is more bothersome at night. Phlegm does not come into her throat when she coughs. She believes her cough is caused by post-nasal drip.      Sakshi denies having diarrhea, vomiting, teeth pain, headache, fever, wheezing, sore throat, nasal congestion, ear pain, enlarged lymph nodes, chills, facial pain or pressure, and myalgias. She also denies double sickening (worsening symptoms after initial improvement), taking antibiotic medication for the symptoms, and having recent facial or sinus surgery in the past 60 days.   Precipitating events  She has not recently been exposed to someone with influenza. Sakshi has been in close contact with the following high risk individuals: pregnant women and children under the age of 5.   Pertinent COVID-19 (Coronavirus) information  Sakshi has not traveled internationally or to the areas where COVID-19 (Coronavirus) is widespread, including cruise ship travel in the last 14 days before the start of her symptoms.   Sakshi has not had a close contact with a laboratory-confirmed COVID-19 patient within " 14 days of symptom onset. She also has not had a close contact with a suspected COVID-19 patient within 14 days of symptom onset.   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 She does not provide direct patient care. She does not live with a healthcare worker.   Pertinent medical history  Sakshi does not get yeast infections when she takes antibiotics.   Sakshi does not need a return to work/school note.   Weight: 185 lbs   Sakshi does not smoke or use smokeless tobacco.   She is pregnant and denies breastfeeding.   Additional information as reported by the patient (free text): I marked yes for nausea, but as you'll note, I am pregnant in my 1st trimester and the nausea preceded the cough and I believe to be morning sickness.   Weight: 185 lbs    MEDICATIONS: No current medications, ALLERGIES: NKDA  Clinician Response:  Dear Sakshi,   Based on the information you have provided, you do have symptoms that are consistent with Coronavirus (COVID-19).  Please let your OB/GYN know that you may have COVID-19.   The coronavirus causes mild to severe respiratory illness with the most common symptoms including fever, cough and difficulty breathing. Unfortunately, many viruses cause similar symptoms and it can be difficult to distinguish between viruses, especially in mild cases, so we are presuming that anyone with cough or fever has coronavirus at this time.  Coronavirus/COVID-19 has reached the point of community spread in [place], meaning that we are finding the virus in people with no known exposure risk for amarilys the virus. Given the increasing commonness of coronavirus in the community we are no longer testing patients who are not critically ill.  If you are a health care worker, you should refer to your employee health office for instructions about testing and returning to work.  For everyone else who has cough or  fever, you should assume you are infected with coronavirus. Since you will not be tested but have symptoms that may be consistent with coronavirus, the CDC recommends you stay in self-isolation until these three things have happened:    You have had no fever for at least 72 hours (that is three full days of no fever without the use of medicine that reduces fevers)    AND   Other symptoms have improved (for example, when your cough or shortness of breath have improved)   AND   At least 7 days have passed since your symptoms first appeared.   How to Isolate:    Isolate yourself at home.   Do Not allow any visitors  Do Not go to work or school  Do Not go to [employer],  centers, shopping, or other public places.  Do Not shake hands.  Avoid close contact with others (hugging, kissing).   Protect Others:    Cover Your Mouth and Nose with a mask, disposable tissue or wash cloth to avoid spreading germs to others.  Wash your hands and face frequently with soap and water.   Managing Symptoms:    At this time, we primarily recommend Tylenol (Acetaminophen) for fever or pain. If you have liver or kidney problems, contact your primary care provider for instructions on use of tylenol. Adults can take 650 mg (two 325 mg pills) by mouth every 4-6 hours as needed OR 1,000 mg (two 500 mg pills) every 8 hours as needed. MAXIMUM DAILY DOSE: 3,000mg. For children, refer to dosing on bottle based on age or weight.   If you develop significant shortness of breath that prevents you from doing normal activities, please call 911 or proceed to the nearest emergency room and alert them immediately that you have been in self-isolation for possible coronavirus.   If you have a higher risk medical condition such as cancer, heart failure, end stage renal disease on dialysis or have a transplant, please reach out to your specialist's clinic to advise them of your [facility] visit should you not improve within the next two days.  For more  information about COVID19 and options for caring for yourself at home, please visit the CDC website at https://www.cdc.gov/coronavirus/2019-ncov/about/steps-when-sick.htmlFor more options for care at [facility], please visit our website at [email or web address]     Diagnosis: Cough  Diagnosis ICD: R05

## 2020-04-03 NOTE — TELEPHONE ENCOUNTER
Per screening protocol, if cough developed in past 14 days, pt to not come to clinic. TC to patient. Pt developed cough last weekend. Advised to reschedule appointments. Appts canceled.   Lisa Clarke RN-BSN

## 2020-04-13 ENCOUNTER — PRENATAL OFFICE VISIT (OUTPATIENT)
Dept: MIDWIFE SERVICES | Facility: CLINIC | Age: 40
End: 2020-04-13
Attending: ADVANCED PRACTICE MIDWIFE
Payer: COMMERCIAL

## 2020-04-13 ENCOUNTER — ANCILLARY PROCEDURE (OUTPATIENT)
Dept: ULTRASOUND IMAGING | Facility: CLINIC | Age: 40
End: 2020-04-13
Attending: ADVANCED PRACTICE MIDWIFE
Payer: COMMERCIAL

## 2020-04-13 VITALS
SYSTOLIC BLOOD PRESSURE: 112 MMHG | DIASTOLIC BLOOD PRESSURE: 65 MMHG | TEMPERATURE: 97.5 F | BODY MASS INDEX: 29.96 KG/M2 | HEART RATE: 74 BPM | WEIGHT: 190.9 LBS | HEIGHT: 67 IN

## 2020-04-13 DIAGNOSIS — O09.529 ELDERLY MULTIGRAVIDA, CURRENTLY PREGNANT: ICD-10-CM

## 2020-04-13 DIAGNOSIS — Z12.4 SCREENING FOR CERVICAL CANCER: ICD-10-CM

## 2020-04-13 DIAGNOSIS — O09.521 AMA (ADVANCED MATERNAL AGE) MULTIGRAVIDA 35+, FIRST TRIMESTER: Primary | ICD-10-CM

## 2020-04-13 LAB
ABO + RH BLD: NORMAL
ABO + RH BLD: NORMAL
ALBUMIN UR-MCNC: NEGATIVE MG/DL
APPEARANCE UR: CLEAR
BILIRUB UR QL STRIP: NEGATIVE
BLD GP AB SCN SERPL QL: NORMAL
BLOOD BANK CMNT PATIENT-IMP: NORMAL
COLOR UR AUTO: YELLOW
ERYTHROCYTE [DISTWIDTH] IN BLOOD BY AUTOMATED COUNT: 13.2 % (ref 10–15)
GLUCOSE UR STRIP-MCNC: NEGATIVE MG/DL
HCT VFR BLD AUTO: 43.1 % (ref 35–47)
HGB BLD-MCNC: 14.8 G/DL (ref 11.7–15.7)
HGB UR QL STRIP: NEGATIVE
KETONES UR STRIP-MCNC: NEGATIVE MG/DL
LEUKOCYTE ESTERASE UR QL STRIP: NEGATIVE
MCH RBC QN AUTO: 30.8 PG (ref 26.5–33)
MCHC RBC AUTO-ENTMCNC: 34.3 G/DL (ref 31.5–36.5)
MCV RBC AUTO: 90 FL (ref 78–100)
NITRATE UR QL: NEGATIVE
PH UR STRIP: 6 PH (ref 5–7)
PLATELET # BLD AUTO: 270 10E9/L (ref 150–450)
RBC # BLD AUTO: 4.81 10E12/L (ref 3.8–5.2)
SOURCE: NORMAL
SP GR UR STRIP: <=1.005 (ref 1–1.03)
SPECIMEN EXP DATE BLD: NORMAL
SPECIMEN SOURCE: ABNORMAL
UROBILINOGEN UR STRIP-ACNC: 0.2 EU/DL (ref 0.2–1)
WBC # BLD AUTO: 9.8 10E9/L (ref 4–11)
WET PREP SPEC: ABNORMAL

## 2020-04-13 PROCEDURE — 86901 BLOOD TYPING SEROLOGIC RH(D): CPT | Performed by: ADVANCED PRACTICE MIDWIFE

## 2020-04-13 PROCEDURE — 86780 TREPONEMA PALLIDUM: CPT | Performed by: ADVANCED PRACTICE MIDWIFE

## 2020-04-13 PROCEDURE — 99207 ZZC FIRST OB VISIT: CPT | Performed by: ADVANCED PRACTICE MIDWIFE

## 2020-04-13 PROCEDURE — 86850 RBC ANTIBODY SCREEN: CPT | Performed by: ADVANCED PRACTICE MIDWIFE

## 2020-04-13 PROCEDURE — 87491 CHLMYD TRACH DNA AMP PROBE: CPT | Performed by: ADVANCED PRACTICE MIDWIFE

## 2020-04-13 PROCEDURE — 86762 RUBELLA ANTIBODY: CPT | Performed by: ADVANCED PRACTICE MIDWIFE

## 2020-04-13 PROCEDURE — G0145 SCR C/V CYTO,THINLAYER,RESCR: HCPCS | Performed by: ADVANCED PRACTICE MIDWIFE

## 2020-04-13 PROCEDURE — 85027 COMPLETE CBC AUTOMATED: CPT | Performed by: ADVANCED PRACTICE MIDWIFE

## 2020-04-13 PROCEDURE — 87624 HPV HI-RISK TYP POOLED RSLT: CPT | Performed by: ADVANCED PRACTICE MIDWIFE

## 2020-04-13 PROCEDURE — 76815 OB US LIMITED FETUS(S): CPT | Performed by: OBSTETRICS & GYNECOLOGY

## 2020-04-13 PROCEDURE — 81003 URINALYSIS AUTO W/O SCOPE: CPT | Performed by: ADVANCED PRACTICE MIDWIFE

## 2020-04-13 PROCEDURE — 36415 COLL VENOUS BLD VENIPUNCTURE: CPT | Performed by: ADVANCED PRACTICE MIDWIFE

## 2020-04-13 PROCEDURE — 86900 BLOOD TYPING SEROLOGIC ABO: CPT | Performed by: ADVANCED PRACTICE MIDWIFE

## 2020-04-13 PROCEDURE — 87591 N.GONORRHOEAE DNA AMP PROB: CPT | Performed by: ADVANCED PRACTICE MIDWIFE

## 2020-04-13 PROCEDURE — 87086 URINE CULTURE/COLONY COUNT: CPT | Performed by: ADVANCED PRACTICE MIDWIFE

## 2020-04-13 PROCEDURE — 84443 ASSAY THYROID STIM HORMONE: CPT | Performed by: ADVANCED PRACTICE MIDWIFE

## 2020-04-13 PROCEDURE — 87389 HIV-1 AG W/HIV-1&-2 AB AG IA: CPT | Performed by: ADVANCED PRACTICE MIDWIFE

## 2020-04-13 PROCEDURE — 87210 SMEAR WET MOUNT SALINE/INK: CPT | Performed by: ADVANCED PRACTICE MIDWIFE

## 2020-04-13 PROCEDURE — 87340 HEPATITIS B SURFACE AG IA: CPT | Performed by: ADVANCED PRACTICE MIDWIFE

## 2020-04-13 SDOH — ECONOMIC STABILITY: TRANSPORTATION INSECURITY
IN THE PAST 12 MONTHS, HAS LACK OF TRANSPORTATION KEPT YOU FROM MEETINGS, WORK, OR FROM GETTING THINGS NEEDED FOR DAILY LIVING?: NO

## 2020-04-13 SDOH — ECONOMIC STABILITY: TRANSPORTATION INSECURITY
IN THE PAST 12 MONTHS, HAS THE LACK OF TRANSPORTATION KEPT YOU FROM MEDICAL APPOINTMENTS OR FROM GETTING MEDICATIONS?: NO

## 2020-04-13 SDOH — ECONOMIC STABILITY: FOOD INSECURITY: WITHIN THE PAST 12 MONTHS, THE FOOD YOU BOUGHT JUST DIDN'T LAST AND YOU DIDN'T HAVE MONEY TO GET MORE.: NEVER TRUE

## 2020-04-13 SDOH — ECONOMIC STABILITY: INCOME INSECURITY: HOW HARD IS IT FOR YOU TO PAY FOR THE VERY BASICS LIKE FOOD, HOUSING, MEDICAL CARE, AND HEATING?: NOT HARD AT ALL

## 2020-04-13 SDOH — ECONOMIC STABILITY: FOOD INSECURITY: WITHIN THE PAST 12 MONTHS, YOU WORRIED THAT YOUR FOOD WOULD RUN OUT BEFORE YOU GOT MONEY TO BUY MORE.: NEVER TRUE

## 2020-04-13 ASSESSMENT — MIFFLIN-ST. JEOR: SCORE: 1573.55

## 2020-04-13 ASSESSMENT — PATIENT HEALTH QUESTIONNAIRE - PHQ9: SUM OF ALL RESPONSES TO PHQ QUESTIONS 1-9: 3

## 2020-04-13 NOTE — PROGRESS NOTES
"Chief Complaint   Patient presents with     Prenatal Care     12+1.     Ultrasound       Initial /65 (BP Location: Left arm, Patient Position: Sitting, Cuff Size: Adult Regular)   Pulse 74   Temp 97.5  F (36.4  C) (Oral)   Ht 1.702 m (5' 7\")   Wt 86.6 kg (190 lb 14.4 oz)   LMP 2020   BMI 29.90 kg/m   Estimated body mass index is 29.9 kg/m  as calculated from the following:    Height as of this encounter: 1.702 m (5' 7\").    Weight as of this encounter: 86.6 kg (190 lb 14.4 oz).  BP completed using cuff size: large    Questioned patient about current smoking habits.  Pt. has never smoked.          The following HM Due: pap smear      The following patient reported/Care Every where data was sent to:  P ABSTRACT QUALITY INITIATIVES [55178]  n/a      patient has appointment for today and orders have been placed              "

## 2020-04-13 NOTE — PROGRESS NOTES
12w1d   Sakshi Parish is a 39 year old who presents to the clinic for an new ob visit. She is a previous CNM patient. Had her son with our group in 2017.   Estimated Date of Delivery: Oct 25, 2020 is calculated from Patient's last menstrual period was 01/19/2020. Had U/S at 12w1d that was only 6 days off.    She has not had bleeding since her LMP.   She has had mild nausea. Weigh loss has not occurred.   This was a planned pregnancy.   FOB is involved,   Landon   OTHER CONCERNS: none    INFECTION HISTORY  HIV: no  Hepatitis B: no  Hepatitis C: no  Syphilis:  no  Tuberculosis: no   PPD- no  Herpes self: no  Herpes partner:  no  Chlamydia:  no  Gonorrhea:  no  HPV: no  BV:  no  Trichomonis:  no  Chicken Pox:  YES -had disease twice  ====================================================  GENETIC SCREENING  Genetic screening reviewed. High Risk? AMA; distant relative with dwarfism  ====================================================  PERSONAL/SOCIAL HISTORY  Lives lives with their family.  Employment: Full time.  Her job involves light activity .  HX OF ABUSE: no  =====================================================   REVIEW OF SYSTEMS  CONSTITUTIONAL: NEGATIVE for fever, chills  EYES: NEGATIVE for vision changes   RESP: NEGATIVE for significant cough or SOB  BREAST: NEGATIVE for masses, tenderness or discharge  CV: NEGATIVE for chest pain, palpitations   GI: POSITIVE for heartburn or reflux and nausea  : NEGATIVE for frequency, dysuria, or hematuria  MUSCULOSKELETAL: NEGATIVE for significant arthralgias or myalgia  NEURO: NEGATIVE for weakness, dizziness or paresthesias or headache  ENDOCRINE: NEGATIVE for temperature intolerance, skin/hair changes  HEME: NEGATIVE for bleeding problems  PSYCHIATRIC: NEGATIVE for changes in mood or affect  ====================================================    PHYSICAL EXAM:  /65 (BP Location: Left arm, Patient Position: Sitting, Cuff Size: Adult Large)    "Pulse 74   Temp 97.5  F (36.4  C) (Oral)   Ht 1.702 m (5' 7\")   Wt 86.6 kg (190 lb 14.4 oz)   LMP 2020   BMI 29.90 kg/m    BMI- Body mass index is 29.9 kg/m .,     RECOMMENDED WEIGHT GAIN: 15-25 lbs.  PHQ9- Today's Depression Rating was No Value exists for the : HP#PHQ9  GENERAL:  Pleasant pregnant female, alert, well groomed.   SKIN:  Warm and dry, without lesions or rashes  HEAD: Symmetrical features.  EYES:  PERRLA,   MOUTH:  Buccal mucosa pink, moist without lesions.    NECK:  Thyroid without enlargement and nodules.  Lymph nodes not palpable.   LUNGS:  Clear to auscultation.  HEART:  RRR without murmur.  ABDOMEN: Soft without masses , tenderness or organomegaly.  No CVA tenderness. No scars noted.     MUSCULOSKELETAL:  Full range of motion  EXTREMITIES:  No edema. No significant varicosities.     GENITALIA:  BUS WNL, no lesions noted   VAGINA:  Pink, normal rugae and discharge white and thick    WET PREP:monilia  GC/CT: pending  =========================================  ASSESSMENT:  12w1d,   (O09.521) AMA (advanced maternal age) multigravida 35+, first trimester  (primary encounter diagnosis)  Plan: Wet prep, Neisseria gonorrhoeae PCR, Chlamydia         trachomatis PCR    (Z12.4) Screening for cervical cancer  Plan: HPV High Risk Types DNA Cervical, Pap imaged         thin layer screen with HPV - recommended age 30        - 65 years (select HPV order below)    PREGNANCY AT RISK? Yes, AMA, will be 39yo by delivery  ==========================================  PLAN:  Instructed on use of triage nurse line and contacting the on call CNM after hours for an urgent need such as fever, vagina bleeding, bladder or vaginal infection, rupture of membranes,  or term labor.    Discussed the indications, uses for and false positives for quad screen, nuchal translucency and fetal survey ultrasound at 18-20 weeks gestation. MFM referral already entered.  Instructed on best evidence for: " weight gain for her BMI for pregnancy; healthy diet and foods to avoid; exercise and activity during pregnancy;avoiding exposure to toxoplasmosis; and maintenance of a generally healthy lifestyle.   Discussed the harms, benefits, side effects and alternative therapies for current prescribed and OTC medications.    Cliff Maier CNM

## 2020-04-14 LAB
BACTERIA SPEC CULT: NO GROWTH
C TRACH DNA SPEC QL NAA+PROBE: NEGATIVE
HBV SURFACE AG SERPL QL IA: NONREACTIVE
HIV 1+2 AB+HIV1 P24 AG SERPL QL IA: NONREACTIVE
N GONORRHOEA DNA SPEC QL NAA+PROBE: NEGATIVE
RUBV IGG SERPL IA-ACNC: 25 IU/ML
SPECIMEN SOURCE: NORMAL
T PALLIDUM AB SER QL: NONREACTIVE
TSH SERPL DL<=0.005 MIU/L-ACNC: 0.43 MU/L (ref 0.4–4)

## 2020-04-16 LAB
COPATH REPORT: NORMAL
PAP: NORMAL

## 2020-04-17 ENCOUNTER — VIRTUAL VISIT (OUTPATIENT)
Dept: MATERNAL FETAL MEDICINE | Facility: CLINIC | Age: 40
End: 2020-04-17
Attending: ADVANCED PRACTICE MIDWIFE
Payer: COMMERCIAL

## 2020-04-17 DIAGNOSIS — O26.90 PREGNANCY RELATED CONDITION, ANTEPARTUM: ICD-10-CM

## 2020-04-17 DIAGNOSIS — O09.521 MULTIGRAVIDA OF ADVANCED MATERNAL AGE IN FIRST TRIMESTER: Primary | ICD-10-CM

## 2020-04-17 LAB
FINAL DIAGNOSIS: NORMAL
HPV HR 12 DNA CVX QL NAA+PROBE: NEGATIVE
HPV16 DNA SPEC QL NAA+PROBE: NEGATIVE
HPV18 DNA SPEC QL NAA+PROBE: NEGATIVE
SPECIMEN DESCRIPTION: NORMAL
SPECIMEN SOURCE CVX/VAG CYTO: NORMAL

## 2020-04-17 PROCEDURE — 40000072 ZZH STATISTIC GENETIC COUNSELING, < 16 MIN: Mod: TEL,ZF | Performed by: GENETIC COUNSELOR, MS

## 2020-04-17 NOTE — PROGRESS NOTES
[facility] Fetal Medicine [place]  Genetic Counseling Consult    Patient: Sakshi Parish YOB: 1980   Date of Service: 20      Sakshi was evaluated via a billable telephone visit at [facility] for genetic consultation given advanced maternal age.    The patient has been notified of the following:  This telephone visit will be conducted via a call between you and your physician/provider. We have found that certain health care needs can be provided without the need for a physical exam. This service lets us provide the care you need with a short phone conversation. If a prescription is necessary we can send it directly to your pharmacy. If lab work is needed we can place an order for that and you can then stop by our lab to have the test done at a later time.     If during the course of the call the provider feels a telephone visit is not appropriate, you will not be charged for this service.     Impression/Plan:   1.  Sakshi had a genetic counseling session only and a blood draw for NIPT (Innatal test through [employer]).  Results are expected within 7-10 days, and will be available in EPIC.  I informed Elsa that [employer] will mail an Innatal kit to her home, and she stated she would go to the [facility] to have her blood drawn. We will contact her to discuss the results, and a copy will be forwarded to the office of the referring OB provider. Elsa requested that I do NOT leave a detailed voicemail if she doesn't answer the phone.    2.  Maternal serum AFP (single marker screen) is recommended after 15 weeks to screen for open neural tube defects. A quad screen should not be performed.    3.  An 18-20 week comprehensive ultrasound is standard of care for all women 35 or older at delivery.    Pregnancy History:   /Parity:    Age at Delivery: 40 year old  PILLO: 10/25/2020, by Last Menstrual  "Period  Gestational Age: 12w5d    No significant complications or exposures were reported in the current pregnancy.    Sakshi cohen pregnancy history is significant for:  o 37w4d , PPROM, male     Medical History:   Sakshi cohen reported medical history is not expected to impact pregnancy management or risks to fetal development.       Family History:   A three-generation pedigree was obtained during Elsa's previous pregnancy by [facility], and is scanned under the  Media  tab.   The following significant findings were reported during today's visit by Sakshi:    Elsa has a female maternal first cousin once removed that was born with \"dwarfism\". Elsa did not know details about which kind of skeletal dysplasia this relative had. There are no other relatives in with this same diagnosis.   o We discussed that the most common kind of skeletal dysplasia is achondroplasia. Achondroplasia is a form of short-limbed dwarfism caused by mutations in the FGFR3 gene. This condition is characterized by the lack of proper ossification which converts cartilage into to bone, especially in the long bones of the arms and legs. Individuals with achondroplasia have short stature, limited range of motion in elbows, macrocephaly, short fingers, and frontal bossing. Other health problems include apnea, obesity, ear infections, and lordosis or kyphosis. In 80% of individuals with achondroplasia they have average-size parents due to a de manuel mutation. The remaining 20% will have one affected parent due to an autosomal dominant pattern of inheritance.     Elsa also informed me that her son is 3 years old and healthy.    Otherwise, the reported family history is negative for multiple miscarriages, stillbirths, birth defects, cognitive impairment, known genetic conditions, and consanguinity.       Carrier Screening:   The patient reports that she and the father of the pregnancy have  ancestry:      Cystic fibrosis is an " autosomal recessive genetic condition that occurs with increased frequency in individuals of  ancestry and carrier screening for this condition is available.  In addition,  screening in the [facility] includes cystic fibrosis.      Expanded carrier screening for mutations in a large panel of genes associated with autosomal recessive conditions including cystic fibrosis, spinal muscular atrophy, and others, is now available.      Carrier screening was not discussed today.       Risk Assessment for Chromosome Conditions:   We explained that the risk for fetal chromosome abnormalities increases with maternal age. We discussed specific features of common chromosome abnormalities, including Down syndrome, trisomy 13, trisomy 18, and sex chromosome trisomies.      - At age 39 at midtrimester, the risk to have a baby with Down syndrome is 1 in 98.     - At age 39 at midtrimester, the risk to have a baby with any chromosome abnormality is 1 in 51.          Testing Options:   We discussed the following options:   Non-invasive Prenatal Testing (NIPT)    Maternal plasma cell-free DNA testing; first trimester ultrasound with nuchal translucency and nasal bone assessment is recommended, when appropriate    Screens for fetal trisomy 21, trisomy 13, trisomy 18, and sex chromosome aneuploidy    Cannot screen for open neural tube defects; maternal serum AFP after 15 weeks is recommended       Genetic Amniocentesis    Invasive procedure typically performed in the second trimester by which amniotic fluid is obtained for the purpose of chromosome analysis and/or other prenatal genetic analysis    Diagnostic results; >99% sensitivity for fetal chromosome abnormalities    AFAFP measurement tests for open neural tube defects       Comprehensive (Level II) ultrasound: Detailed ultrasound performed between 18-22 weeks gestation to screen for major birth defects and markers for aneuploidy.      We reviewed the benefits  and limitations of this testing.  Screening tests provide a risk assessment specific to the pregnancy for certain fetal chromosome abnormalities, but cannot definitively diagnose or exclude a fetal chromosome abnormality.  Follow-up genetic counseling and consideration of diagnostic testing is recommended with any abnormal screening result.     Diagnostic tests carry inherent risks- including risk of miscarriage- that require careful consideration.  These tests can detect fetal chromosome abnormalities with greater than 99% certainty.  Results can be compromised by maternal cell contamination or mosaicism, and are limited by the resolution of cytogenetic G-banding technology.  There is no screening nor diagnostic test that can detect all forms of birth defects or mental disability.     It was a pleasure to be involved with Sakshi cohen [facility].     Phone Call Contact Time  Call Started at 9:31am  Call Ended at 10:01am      Anat Norman MS, [facility]  Genetic Counselor  Maternal Fetal Medicine  [facility]   Phone: [phone]  Pager: [phone]  Email: [email or web address]

## 2020-04-23 ENCOUNTER — HOSPITAL ENCOUNTER (OUTPATIENT)
Dept: LAB | Facility: CLINIC | Age: 40
End: 2020-04-23
Payer: COMMERCIAL

## 2020-04-23 ENCOUNTER — APPOINTMENT (OUTPATIENT)
Dept: LAB | Facility: CLINIC | Age: 40
End: 2020-04-23
Attending: OBSTETRICS & GYNECOLOGY
Payer: COMMERCIAL

## 2020-04-23 DIAGNOSIS — O09.521 MULTIGRAVIDA OF ADVANCED MATERNAL AGE IN FIRST TRIMESTER: ICD-10-CM

## 2020-04-28 ENCOUNTER — TELEPHONE (OUTPATIENT)
Dept: MATERNAL FETAL MEDICINE | Facility: CLINIC | Age: 40
End: 2020-04-28

## 2020-04-28 DIAGNOSIS — O26.90 PREGNANCY RELATED CONDITION, ANTEPARTUM: ICD-10-CM

## 2020-04-28 DIAGNOSIS — O09.521 MULTIGRAVIDA OF ADVANCED MATERNAL AGE IN FIRST TRIMESTER: Primary | ICD-10-CM

## 2020-04-28 NOTE — TELEPHONE ENCOUNTER
April 28, 2020   I spoke with Sakshi regarding her NIPT results.     Results indicate NO ANEUPLOIDY DETECTED for chromosomes 21, 18, 13, or sex chromosomes (XY).     This puts her current pregnancy at low risk for Down syndrome, trisomy 18, trisomy 13 and sex chromosome abnormalities. This test is reported to have the following sensitivities: Down syndrome- 99%, trisomy 18- 98%, and trisomy 13- 98%. Although these results are reassuring, this does not replace a standard chromosome analysis from a chorionic villus sampling or amniocentesis.     Level II ultrasound is recommended, given AMA.     MSAFP is the appropriate second trimester screening test for open neural tube defects; the maternal quad screen is not recommended.    Her results are available in her Epic chart for her primary OB to review.     Kathy Chung MS, [facility]  Genetic Counselor  Phone: [phone]  Pager: [phone]

## 2020-04-28 NOTE — TELEPHONE ENCOUNTER
April 28, 2020    Duplicate NIPT order requested in order to result appropriately. This was placed as previous order cancelled in error.     Kathy Chung MS, [facility]  Maternal Fetal Medicine  [facility]  Ph: [phone]  [email or web address]

## 2020-05-18 ENCOUNTER — PRENATAL OFFICE VISIT (OUTPATIENT)
Dept: MIDWIFE SERVICES | Facility: CLINIC | Age: 40
End: 2020-05-18
Payer: COMMERCIAL

## 2020-05-18 VITALS
SYSTOLIC BLOOD PRESSURE: 124 MMHG | WEIGHT: 195.4 LBS | DIASTOLIC BLOOD PRESSURE: 79 MMHG | HEART RATE: 97 BPM | BODY MASS INDEX: 30.6 KG/M2

## 2020-05-18 DIAGNOSIS — O09.529 AMA (ADVANCED MATERNAL AGE) MULTIGRAVIDA 35+, UNSPECIFIED TRIMESTER: Primary | ICD-10-CM

## 2020-05-18 PROCEDURE — 36415 COLL VENOUS BLD VENIPUNCTURE: CPT | Performed by: ADVANCED PRACTICE MIDWIFE

## 2020-05-18 PROCEDURE — 99000 SPECIMEN HANDLING OFFICE-LAB: CPT | Performed by: ADVANCED PRACTICE MIDWIFE

## 2020-05-18 PROCEDURE — 99207 ZZC PRENATAL VISIT: CPT | Performed by: ADVANCED PRACTICE MIDWIFE

## 2020-05-18 PROCEDURE — 82105 ALPHA-FETOPROTEIN SERUM: CPT | Mod: 90 | Performed by: ADVANCED PRACTICE MIDWIFE

## 2020-05-18 NOTE — PROGRESS NOTES
Pt here in clinic. Feeling well, having some heartburn. Discussed relief strategies and meds prn. Doesn't feel like she needs a daily medication just yet. Not feeling fetal movement yet. No contractions, LOF or bleeding. Would like MSAFP, order in for lab today. Order in for Level II US with MFM to be done 19-20 weeks and then will RTC for CNM visit at 24 weeks for GCT, hgb. MML

## 2020-05-21 LAB
# FETUSES US: NORMAL
# FETUSES: NORMAL
AFP ADJ MOM AMN: 1.45
AFP SERPL-MCNC: 46 NG/ML
AGE - REPORTED: 40.1 YR
CURRENT SMOKER: NO
CURRENT SMOKER: NO
DIABETES STATUS PATIENT: NO
FAMILY MEMBER DISEASES HX: NO
FAMILY MEMBER DISEASES HX: NO
GA METHOD: NORMAL
GA METHOD: NORMAL
GA: NORMAL WK
IDDM PATIENT QL: NO
INTEGRATED SCN PATIENT-IMP: NORMAL
LMP START DATE: NORMAL
MONOCHORIONIC TWINS: NO
SERVICE CMNT-IMP: NO
SPECIMEN DRAWN SERPL: NORMAL
VALPROIC/CARBAMAZEPINE STATUS: NO
WEIGHT UNITS: NORMAL

## 2020-05-26 ENCOUNTER — PRE VISIT (OUTPATIENT)
Dept: MATERNAL FETAL MEDICINE | Facility: CLINIC | Age: 40
End: 2020-05-26

## 2020-05-28 ENCOUNTER — OFFICE VISIT (OUTPATIENT)
Dept: MATERNAL FETAL MEDICINE | Facility: CLINIC | Age: 40
End: 2020-05-28
Attending: ADVANCED PRACTICE MIDWIFE
Payer: COMMERCIAL

## 2020-05-28 ENCOUNTER — HOSPITAL ENCOUNTER (OUTPATIENT)
Dept: ULTRASOUND IMAGING | Facility: CLINIC | Age: 40
End: 2020-05-28
Attending: ADVANCED PRACTICE MIDWIFE
Payer: COMMERCIAL

## 2020-05-28 DIAGNOSIS — O35.9XX0 SUSPECTED FETAL ANOMALY, ANTEPARTUM, SINGLE OR UNSPECIFIED FETUS: ICD-10-CM

## 2020-05-28 DIAGNOSIS — O09.522 MULTIGRAVIDA OF ADVANCED MATERNAL AGE IN SECOND TRIMESTER: Primary | ICD-10-CM

## 2020-05-28 DIAGNOSIS — O26.90 PREGNANCY RELATED CONDITION, ANTEPARTUM: ICD-10-CM

## 2020-05-28 PROCEDURE — 76811 OB US DETAILED SNGL FETUS: CPT

## 2020-05-29 NOTE — PROGRESS NOTES
"Please see \"Imaging\" tab under \"Chart Review\" for details of today's US.    Ann Miller, DO    "

## 2020-06-04 ENCOUNTER — HOSPITAL ENCOUNTER (OUTPATIENT)
Dept: CARDIOLOGY | Facility: CLINIC | Age: 40
Discharge: HOME | End: 2020-06-04
Attending: OBSTETRICS & GYNECOLOGY | Admitting: OBSTETRICS & GYNECOLOGY
Payer: COMMERCIAL

## 2020-06-04 DIAGNOSIS — O35.9XX0 SUSPECTED FETAL ANOMALY, ANTEPARTUM, SINGLE OR UNSPECIFIED FETUS: ICD-10-CM

## 2020-06-04 PROCEDURE — 76825 ECHO EXAM OF FETAL HEART: CPT

## 2020-06-04 NOTE — PROGRESS NOTES
Fetal Cardiology Consultation    Patient:  Sakshi Parish MRN:  [id number]   YOB: 1980 Age:  39 year old   Date of Visit:  2020 PCP:  Hussein ArriazaNicevillen   PILLO: 10/25/2020, by Last Menstrual Period EGA: 19w4d weeks     Dear Dr. Miller:    I had the pleasure of seeing Sakshi Parish at the [facility] in [place] on 2020 in consultation for fetal echocardiography results. She presented today by herself. As you know, she is a 39 year old female with suspected fetal cardiac anomaly on obstetrical ultrasound; additionally the father of this pregnancy has a bicuspid aortic valve.    The fetal echocardiogram was technically challenging. Likely normal fetal echocardiogram. Normal fetal cardiac anatomy. Normal right and left ventricular size and function without hypertrophy. No evidence of diastolic dysfunction. No pericardial effusion. No arrhythmia.     I reviewed and interpreted the fetal echocardiogram today. I discussed the normal results with Ms. Parish. While these results are normal, it is important to note that fetal echocardiography cannot exclude small atrial or ventricular septal defects, persistent ductus arteriosus, mild coarctation of the aorta, partial anomalous pulmonary venous return, minor anatomic valve anomalies, or coronary artery anomalies.     -- No additional fetal echocardiograms are recommended for this pregnancy.  -- A post-john paul transthoracic echocardiogram is recommended to exclude subtle anomalies, and screen for bicuspid aortic valve prior to discharge.    Thank you for allowing me to participate in Ms. Parish's care. Please don't hesitate to contact me or the Fetal Cardiology team at [facility] with any questions or concerns.     I spent a total of 10 minutes face-to-face with Ms. Parish during today's office visit. Over 50% of this time was spent counseling the patient  and/or coordinating care regarding the fetal echocardiography results.     Frantz Gilbert MD  Pediatric Cardiology  [facility]  Phone [phone]

## 2020-06-05 ENCOUNTER — TELEPHONE (OUTPATIENT)
Dept: MATERNAL FETAL MEDICINE | Facility: CLINIC | Age: 40
End: 2020-06-05

## 2020-06-05 NOTE — TELEPHONE ENCOUNTER
Called Elsa to discuss fetal echo results/RL2 appt scheduling, no answer. Left VM requesting call back to [facility] # for scheduling.     Update: Scheduled RL2 with Elsa per Dr. Miller recommendation

## 2020-06-18 ENCOUNTER — HOSPITAL ENCOUNTER (OUTPATIENT)
Dept: ULTRASOUND IMAGING | Facility: CLINIC | Age: 40
End: 2020-06-18
Attending: OBSTETRICS & GYNECOLOGY
Payer: COMMERCIAL

## 2020-06-18 ENCOUNTER — OFFICE VISIT (OUTPATIENT)
Dept: MATERNAL FETAL MEDICINE | Facility: CLINIC | Age: 40
End: 2020-06-18
Attending: OBSTETRICS & GYNECOLOGY
Payer: COMMERCIAL

## 2020-06-18 DIAGNOSIS — O35.9XX0 SUSPECTED FETAL ANOMALY, ANTEPARTUM, SINGLE OR UNSPECIFIED FETUS: ICD-10-CM

## 2020-06-18 DIAGNOSIS — O09.529 ANTEPARTUM MULTIGRAVIDA OF ADVANCED MATERNAL AGE: ICD-10-CM

## 2020-06-18 DIAGNOSIS — O35.9XX0 SUSPECTED FETAL ANOMALY, ANTEPARTUM, SINGLE OR UNSPECIFIED FETUS: Primary | ICD-10-CM

## 2020-06-18 PROCEDURE — 76816 OB US FOLLOW-UP PER FETUS: CPT

## 2020-06-18 NOTE — PROGRESS NOTES
"Please see \"Imaging\" tab under \"Chart Review\" for details of today's US at the [facility].    Kodi Martinez MD  Maternal-Fetal Medicine      "

## 2020-07-10 ENCOUNTER — PRENATAL OFFICE VISIT (OUTPATIENT)
Dept: MIDWIFE SERVICES | Facility: CLINIC | Age: 40
End: 2020-07-10
Payer: COMMERCIAL

## 2020-07-10 ENCOUNTER — APPOINTMENT (OUTPATIENT)
Dept: LAB | Facility: CLINIC | Age: 40
End: 2020-07-10
Payer: COMMERCIAL

## 2020-07-10 VITALS
SYSTOLIC BLOOD PRESSURE: 98 MMHG | OXYGEN SATURATION: 95 % | HEART RATE: 96 BPM | BODY MASS INDEX: 30.85 KG/M2 | WEIGHT: 197 LBS | DIASTOLIC BLOOD PRESSURE: 62 MMHG

## 2020-07-10 DIAGNOSIS — O09.529 AMA (ADVANCED MATERNAL AGE) MULTIGRAVIDA 35+, UNSPECIFIED TRIMESTER: ICD-10-CM

## 2020-07-10 DIAGNOSIS — O09.522 AMA (ADVANCED MATERNAL AGE) MULTIGRAVIDA 35+, SECOND TRIMESTER: Primary | ICD-10-CM

## 2020-07-10 LAB
GLUCOSE 1H P 50 G GLC PO SERPL-MCNC: 85 MG/DL (ref 60–129)
HGB BLD-MCNC: 11.8 G/DL (ref 11.7–15.7)

## 2020-07-10 PROCEDURE — 36415 COLL VENOUS BLD VENIPUNCTURE: CPT | Performed by: ADVANCED PRACTICE MIDWIFE

## 2020-07-10 PROCEDURE — 99207 ZZC PRENATAL VISIT: CPT | Performed by: ADVANCED PRACTICE MIDWIFE

## 2020-07-10 PROCEDURE — 82950 GLUCOSE TEST: CPT | Performed by: ADVANCED PRACTICE MIDWIFE

## 2020-07-10 PROCEDURE — 00000218 ZZHCL STATISTIC OBHBG - HEMOGLOBIN: Performed by: ADVANCED PRACTICE MIDWIFE

## 2020-07-10 NOTE — RESULT ENCOUNTER NOTE
Dear Sakshi,    Your test results are attached below. Great news. You passed your glucose test! Your hemoglobin is at a good level. If you have any questions, please contact me via [facility] or you can call our office at [phone].    Alma Chan CNM, APRN GARY, CNM

## 2020-07-10 NOTE — PROGRESS NOTES
24w5d  Feeling well. GCT and hgb today. Baby was active on exam. Reports good fetal movement. Denies leaking of fluid, vaginal bleeding, regular uterine contractions, headache or other concerns.  RTC in 4 wks. Discussed and recommended TDAP for next time. JITENDRA

## 2020-08-07 ENCOUNTER — PRENATAL OFFICE VISIT (OUTPATIENT)
Dept: MIDWIFE SERVICES | Facility: CLINIC | Age: 40
End: 2020-08-07
Payer: COMMERCIAL

## 2020-08-07 VITALS
DIASTOLIC BLOOD PRESSURE: 69 MMHG | SYSTOLIC BLOOD PRESSURE: 115 MMHG | BODY MASS INDEX: 30.2 KG/M2 | WEIGHT: 192.8 LBS | HEART RATE: 93 BPM

## 2020-08-07 DIAGNOSIS — Z23 NEED FOR TDAP VACCINATION: ICD-10-CM

## 2020-08-07 DIAGNOSIS — O09.523 MULTIGRAVIDA OF ADVANCED MATERNAL AGE IN THIRD TRIMESTER: Primary | ICD-10-CM

## 2020-08-07 PROCEDURE — 99207 ZZC PRENATAL VISIT: CPT | Performed by: ADVANCED PRACTICE MIDWIFE

## 2020-08-07 PROCEDURE — 90715 TDAP VACCINE 7 YRS/> IM: CPT | Performed by: ADVANCED PRACTICE MIDWIFE

## 2020-08-07 PROCEDURE — 90471 IMMUNIZATION ADMIN: CPT | Performed by: ADVANCED PRACTICE MIDWIFE

## 2020-08-07 NOTE — PROGRESS NOTES
28w5d  Elsa is feeling well. She has had weight loss of 5lb since last visit. No nausea or vomiting, no diarrhea. Eating well. She has an US scheduled with MFM already in three weeks to follow up on growth due to being AMA. Fundal height measuring S=D. Reports good fetal movement. Denies leaking of fluid, vaginal bleeding, regular uterine contractions, headache, visual changes, or other concerns. TDAP today. Okay to return to clinic in 3 weeks to align with MFM visit.     YUMIKO WayM

## 2020-08-27 ENCOUNTER — OFFICE VISIT (OUTPATIENT)
Dept: MATERNAL FETAL MEDICINE | Facility: CLINIC | Age: 40
End: 2020-08-27
Attending: OBSTETRICS & GYNECOLOGY
Payer: COMMERCIAL

## 2020-08-27 ENCOUNTER — HOSPITAL ENCOUNTER (OUTPATIENT)
Dept: ULTRASOUND IMAGING | Facility: CLINIC | Age: 40
End: 2020-08-27
Attending: OBSTETRICS & GYNECOLOGY
Payer: COMMERCIAL

## 2020-08-27 DIAGNOSIS — O09.529 ANTEPARTUM MULTIGRAVIDA OF ADVANCED MATERNAL AGE: Primary | ICD-10-CM

## 2020-08-27 DIAGNOSIS — O09.529 ANTEPARTUM MULTIGRAVIDA OF ADVANCED MATERNAL AGE: ICD-10-CM

## 2020-08-27 PROBLEM — O35.BXX0 FETAL CARDIAC ANOMALY AFFECTING PREGNANCY, ANTEPARTUM: Status: ACTIVE | Noted: 2020-08-27

## 2020-08-27 PROCEDURE — 76816 OB US FOLLOW-UP PER FETUS: CPT

## 2020-09-02 ENCOUNTER — PRENATAL OFFICE VISIT (OUTPATIENT)
Dept: MIDWIFE SERVICES | Facility: CLINIC | Age: 40
End: 2020-09-02
Payer: COMMERCIAL

## 2020-09-02 VITALS
SYSTOLIC BLOOD PRESSURE: 98 MMHG | HEART RATE: 101 BPM | DIASTOLIC BLOOD PRESSURE: 62 MMHG | WEIGHT: 198 LBS | HEIGHT: 67 IN | OXYGEN SATURATION: 98 % | BODY MASS INDEX: 31.08 KG/M2

## 2020-09-02 DIAGNOSIS — O09.523 MULTIGRAVIDA OF ADVANCED MATERNAL AGE IN THIRD TRIMESTER: Primary | ICD-10-CM

## 2020-09-02 PROCEDURE — 99207 ZZC PRENATAL VISIT: CPT | Performed by: ADVANCED PRACTICE MIDWIFE

## 2020-09-02 ASSESSMENT — MIFFLIN-ST. JEOR: SCORE: 1605.75

## 2020-09-02 NOTE — PROGRESS NOTES
32w3d  Elsa is feeling well. Reviewed indications for US at 36 weeks and BPPs due to AMA. She agrees with this plan. Reports good fetal movement. Denies leaking of fluid, vaginal bleeding, regular uterine contractions, headache, visual changes, or other concerns. RTC in 2 weeks. Planning to meet rest of CNM team.    YUMIKO WayM

## 2020-09-18 ENCOUNTER — PRENATAL OFFICE VISIT (OUTPATIENT)
Dept: MIDWIFE SERVICES | Facility: CLINIC | Age: 40
End: 2020-09-18
Payer: COMMERCIAL

## 2020-09-18 VITALS
DIASTOLIC BLOOD PRESSURE: 67 MMHG | SYSTOLIC BLOOD PRESSURE: 118 MMHG | HEART RATE: 84 BPM | TEMPERATURE: 98.1 F | BODY MASS INDEX: 30.9 KG/M2 | WEIGHT: 197.3 LBS

## 2020-09-18 DIAGNOSIS — Z23 NEED FOR PROPHYLACTIC VACCINATION AND INOCULATION AGAINST INFLUENZA: ICD-10-CM

## 2020-09-18 DIAGNOSIS — O09.523 AMA (ADVANCED MATERNAL AGE) MULTIGRAVIDA 35+, THIRD TRIMESTER: Primary | ICD-10-CM

## 2020-09-18 PROCEDURE — 99207 ZZC PRENATAL VISIT: CPT | Performed by: ADVANCED PRACTICE MIDWIFE

## 2020-09-18 PROCEDURE — 90471 IMMUNIZATION ADMIN: CPT | Performed by: ADVANCED PRACTICE MIDWIFE

## 2020-09-18 PROCEDURE — 90686 IIV4 VACC NO PRSV 0.5 ML IM: CPT | Performed by: ADVANCED PRACTICE MIDWIFE

## 2020-09-18 NOTE — PROGRESS NOTES
34w5d  Feeling well. Reports good fetal movement. Denies leaking of fluid, vaginal bleeding, regular uterine contractions, headache or other concerns.  RTC in 2 wks. Discussed GBS and hgb. Has MFM ultrasounds BPP scheduled, does not need BSUS if baby cephalic on biweekly BPPs.   J

## 2020-09-23 ENCOUNTER — COMMUNICATION - HEALTHEAST (OUTPATIENT)
Dept: HEALTH INFORMATION MANAGEMENT | Facility: CLINIC | Age: 40
End: 2020-09-23

## 2020-09-25 ENCOUNTER — HOSPITAL ENCOUNTER (OUTPATIENT)
Dept: ULTRASOUND IMAGING | Facility: CLINIC | Age: 40
End: 2020-09-25
Attending: OBSTETRICS & GYNECOLOGY
Payer: COMMERCIAL

## 2020-09-25 ENCOUNTER — OFFICE VISIT (OUTPATIENT)
Dept: MATERNAL FETAL MEDICINE | Facility: CLINIC | Age: 40
End: 2020-09-25
Attending: OBSTETRICS & GYNECOLOGY
Payer: COMMERCIAL

## 2020-09-25 DIAGNOSIS — O09.529 ANTEPARTUM MULTIGRAVIDA OF ADVANCED MATERNAL AGE: Primary | ICD-10-CM

## 2020-09-25 DIAGNOSIS — O09.529 ANTEPARTUM MULTIGRAVIDA OF ADVANCED MATERNAL AGE: ICD-10-CM

## 2020-09-25 PROCEDURE — 76816 OB US FOLLOW-UP PER FETUS: CPT

## 2020-09-25 NOTE — PROGRESS NOTES
The patient was seen for an ultrasound in the M[facility]Fetal Medicine Center at the [facility] today.  For a detailed report of the ultrasound examination, please see the ultrasound report which can be found under the imaging tab.      Merline Jara MD  Maternal-Fetal Medicine

## 2020-10-02 ENCOUNTER — HOSPITAL ENCOUNTER (OUTPATIENT)
Dept: ULTRASOUND IMAGING | Facility: CLINIC | Age: 40
End: 2020-10-02
Attending: OBSTETRICS & GYNECOLOGY
Payer: COMMERCIAL

## 2020-10-02 ENCOUNTER — OFFICE VISIT (OUTPATIENT)
Dept: MATERNAL FETAL MEDICINE | Facility: CLINIC | Age: 40
End: 2020-10-02
Attending: OBSTETRICS & GYNECOLOGY
Payer: COMMERCIAL

## 2020-10-02 ENCOUNTER — PRENATAL OFFICE VISIT (OUTPATIENT)
Dept: MIDWIFE SERVICES | Facility: CLINIC | Age: 40
End: 2020-10-02
Payer: COMMERCIAL

## 2020-10-02 VITALS
HEIGHT: 67 IN | DIASTOLIC BLOOD PRESSURE: 66 MMHG | WEIGHT: 199.4 LBS | BODY MASS INDEX: 31.3 KG/M2 | HEART RATE: 86 BPM | SYSTOLIC BLOOD PRESSURE: 112 MMHG

## 2020-10-02 DIAGNOSIS — O09.523 AMA (ADVANCED MATERNAL AGE) MULTIGRAVIDA 35+, THIRD TRIMESTER: Primary | ICD-10-CM

## 2020-10-02 DIAGNOSIS — O09.529 ANTEPARTUM MULTIGRAVIDA OF ADVANCED MATERNAL AGE: ICD-10-CM

## 2020-10-02 DIAGNOSIS — O09.529 ANTEPARTUM MULTIGRAVIDA OF ADVANCED MATERNAL AGE: Primary | ICD-10-CM

## 2020-10-02 LAB — HGB BLD-MCNC: 12.7 G/DL (ref 11.7–15.7)

## 2020-10-02 PROCEDURE — 76819 FETAL BIOPHYS PROFIL W/O NST: CPT

## 2020-10-02 PROCEDURE — 87653 STREP B DNA AMP PROBE: CPT | Performed by: ADVANCED PRACTICE MIDWIFE

## 2020-10-02 PROCEDURE — 99N1025 PR STATISTIC OBHBG - HEMOGLOBIN: Performed by: ADVANCED PRACTICE MIDWIFE

## 2020-10-02 PROCEDURE — 99207 PR NO CHARGE LOS: CPT | Performed by: OBSTETRICS & GYNECOLOGY

## 2020-10-02 PROCEDURE — 36415 COLL VENOUS BLD VENIPUNCTURE: CPT | Performed by: ADVANCED PRACTICE MIDWIFE

## 2020-10-02 PROCEDURE — 76819 FETAL BIOPHYS PROFIL W/O NST: CPT | Mod: 26 | Performed by: OBSTETRICS & GYNECOLOGY

## 2020-10-02 PROCEDURE — 86803 HEPATITIS C AB TEST: CPT | Performed by: ADVANCED PRACTICE MIDWIFE

## 2020-10-02 PROCEDURE — 99207 PR PRENATAL VISIT: CPT | Performed by: ADVANCED PRACTICE MIDWIFE

## 2020-10-02 RX ORDER — LACTOBACILLUS RHAMNOSUS GG 10B CELL
1 CAPSULE ORAL 2 TIMES DAILY
COMMUNITY

## 2020-10-02 ASSESSMENT — PATIENT HEALTH QUESTIONNAIRE - PHQ9
5. POOR APPETITE OR OVEREATING: NOT AT ALL
SUM OF ALL RESPONSES TO PHQ QUESTIONS 1-9: 6

## 2020-10-02 ASSESSMENT — ANXIETY QUESTIONNAIRES
1. FEELING NERVOUS, ANXIOUS, OR ON EDGE: NOT AT ALL
6. BECOMING EASILY ANNOYED OR IRRITABLE: NOT AT ALL
7. FEELING AFRAID AS IF SOMETHING AWFUL MIGHT HAPPEN: NOT AT ALL
5. BEING SO RESTLESS THAT IT IS HARD TO SIT STILL: NOT AT ALL
2. NOT BEING ABLE TO STOP OR CONTROL WORRYING: NOT AT ALL
3. WORRYING TOO MUCH ABOUT DIFFERENT THINGS: NOT AT ALL
GAD7 TOTAL SCORE: 0

## 2020-10-02 ASSESSMENT — MIFFLIN-ST. JEOR: SCORE: 1607.1

## 2020-10-02 NOTE — PROGRESS NOTES
36w5d  Feeling well. Has  surveillence after this appointment at [facility]. Ready for baby. Reports good fetal movement. Denies leaking of fluid, vaginal bleeding, regular uterine contractions, headache or other concerns.  RTC in 1 wk [facility]

## 2020-10-03 LAB
GP B STREP DNA SPEC QL NAA+PROBE: NEGATIVE
SPECIMEN SOURCE: NORMAL

## 2020-10-03 ASSESSMENT — ANXIETY QUESTIONNAIRES: GAD7 TOTAL SCORE: 0

## 2020-10-04 NOTE — RESULT ENCOUNTER NOTE
Deajacqui Cantor,    Your test results are attached below.  If you have any questions, please contact me via [employer] or you can call our office at [phone].    Alma Chan CNM, APRN GARY, CNKEITH

## 2020-10-05 LAB — HCV AB SERPL QL IA: NONREACTIVE

## 2020-10-06 NOTE — RESULT ENCOUNTER NOTE
Deajacuqi Cantor,    Your test results are attached below. If you have any questions, please contact me via britebill or you can call our office at [phone].    Alma Chan CNM, APRN GARY, CNKEITH

## 2020-10-08 ENCOUNTER — OFFICE VISIT (OUTPATIENT)
Dept: MATERNAL FETAL MEDICINE | Facility: CLINIC | Age: 40
End: 2020-10-08
Attending: OBSTETRICS & GYNECOLOGY
Payer: COMMERCIAL

## 2020-10-08 ENCOUNTER — HOSPITAL ENCOUNTER (OUTPATIENT)
Dept: ULTRASOUND IMAGING | Facility: CLINIC | Age: 40
End: 2020-10-08
Attending: OBSTETRICS & GYNECOLOGY
Payer: COMMERCIAL

## 2020-10-08 ENCOUNTER — PRENATAL OFFICE VISIT (OUTPATIENT)
Dept: MIDWIFE SERVICES | Facility: CLINIC | Age: 40
End: 2020-10-08
Payer: COMMERCIAL

## 2020-10-08 VITALS
DIASTOLIC BLOOD PRESSURE: 69 MMHG | BODY MASS INDEX: 31.17 KG/M2 | HEART RATE: 110 BPM | SYSTOLIC BLOOD PRESSURE: 113 MMHG | WEIGHT: 199 LBS | OXYGEN SATURATION: 96 %

## 2020-10-08 DIAGNOSIS — O09.529 ANTEPARTUM MULTIGRAVIDA OF ADVANCED MATERNAL AGE: Primary | ICD-10-CM

## 2020-10-08 DIAGNOSIS — Z34.83 ENCOUNTER FOR SUPERVISION OF OTHER NORMAL PREGNANCY, THIRD TRIMESTER: Primary | ICD-10-CM

## 2020-10-08 DIAGNOSIS — O09.529 ANTEPARTUM MULTIGRAVIDA OF ADVANCED MATERNAL AGE: ICD-10-CM

## 2020-10-08 PROCEDURE — 76819 FETAL BIOPHYS PROFIL W/O NST: CPT | Mod: 26 | Performed by: OBSTETRICS & GYNECOLOGY

## 2020-10-08 PROCEDURE — 99207 PR NO CHARGE LOS: CPT | Performed by: OBSTETRICS & GYNECOLOGY

## 2020-10-08 PROCEDURE — 76819 FETAL BIOPHYS PROFIL W/O NST: CPT

## 2020-10-08 PROCEDURE — 99207 PR PRENATAL VISIT: CPT | Performed by: ADVANCED PRACTICE MIDWIFE

## 2020-10-08 NOTE — PROGRESS NOTES
37w4d  Feeling well. Questions about COVID restrictions. Discussed recommendation for IOL at 39 wks. Hopefull she will labor spontaneously before then. Answered questions about IOL process.  Reports good fetal movement. Denies leaking of fluid, vaginal bleeding, regular uterine contractions, headache or other concerns.  RTC in 1 wk.

## 2020-10-08 NOTE — PROGRESS NOTES
"Please see \"Imaging\" tab under \"Chart Review\" for details of today's US at the [facility].    Kodi Martinez MD  Maternal-Fetal Medicine      "

## 2020-10-10 ENCOUNTER — TELEPHONE (OUTPATIENT)
Dept: MIDWIFE SERVICES | Facility: CLINIC | Age: 40
End: 2020-10-10

## 2020-10-10 ENCOUNTER — NURSE TRIAGE (OUTPATIENT)
Dept: NURSING | Facility: CLINIC | Age: 40
End: 2020-10-10

## 2020-10-10 DIAGNOSIS — O09.523 AMA (ADVANCED MATERNAL AGE) MULTIGRAVIDA 35+, THIRD TRIMESTER: ICD-10-CM

## 2020-10-10 NOTE — TELEPHONE ENCOUNTER
Elsa calling in to say that her water broke at about 5:30 am.  However, she is not having contractions and does not feel like she would like to come into L&D and just sit around.  She feels fine and feels as though the baby is moving around well.  Paged on call, Gaurang Sandra.  Gaurang stated that he would call Elsa when he arrived at the hospital.  Called Elsa back to let her know that Gaurang would telephone her.    Reason for Disposition    [1] Leakage of fluid from vagina AND [2] leakage started < 4 hours ago    Protocols used: PREGNANCY - LABOR-A-AH

## 2020-10-10 NOTE — TELEPHONE ENCOUNTER
Returning a call from the Midwife.    See previous message. She would like to speak to him.  I advised her that I would page him now for her and have him give her a call back.  She verbalized understanding.    Page to on call Gaurang VEGA APRN CNM.    Melodie Torres RN  [facility] Nurse Advisor  10/10/2020 at 6:52 PM      Additional Information    Nursing judgment    Protocols used: NO PROTOCOL AVAILABLE - INFORMATION ONLY-A-OH

## 2020-10-10 NOTE — TELEPHONE ENCOUNTER
Sakshi Frenchsdblade is 37w6d and feels like her water is leaking since 0530.    OBJECTIVE: GBS is negative.  No specific gush of fluid but wearing a pad and feels like a little bit at a time  No ctx or cramping per say.   Hx of PROM with last pregnancy and waited until IOL     ASSESSMENT: 37w6d    ROM suspected.  PLAN: Pt will do early labor at home for 12 hrs and if not in active labor this evening will come in for eval and if ROM assess for augmentation plan

## 2020-10-11 ENCOUNTER — HOSPITAL ENCOUNTER (INPATIENT)
Facility: CLINIC | Age: 40
LOS: 2 days | Discharge: HOME | End: 2020-10-13
Attending: ADVANCED PRACTICE MIDWIFE | Admitting: ADVANCED PRACTICE MIDWIFE
Payer: COMMERCIAL

## 2020-10-11 ENCOUNTER — NURSE TRIAGE (OUTPATIENT)
Dept: NURSING | Facility: CLINIC | Age: 40
End: 2020-10-11

## 2020-10-11 LAB
ABO + RH BLD: NORMAL
ABO + RH BLD: NORMAL
BASOPHILS # BLD AUTO: 0 10E9/L (ref 0–0.2)
BASOPHILS NFR BLD AUTO: 0.1 %
BLD GP AB SCN SERPL QL: NORMAL
BLOOD BANK CMNT PATIENT-IMP: NORMAL
DIFFERENTIAL METHOD BLD: NORMAL
EOSINOPHIL # BLD AUTO: 0 10E9/L (ref 0–0.7)
EOSINOPHIL NFR BLD AUTO: 0.2 %
ERYTHROCYTE [DISTWIDTH] IN BLOOD BY AUTOMATED COUNT: 13.9 % (ref 10–15)
HCT VFR BLD AUTO: 39 % (ref 35–47)
HGB BLD-MCNC: 13.1 G/DL (ref 11.7–15.7)
IMM GRANULOCYTES # BLD: 0.1 10E9/L (ref 0–0.4)
IMM GRANULOCYTES NFR BLD: 0.6 %
LABORATORY COMMENT REPORT: NORMAL
LYMPHOCYTES # BLD AUTO: 1.6 10E9/L (ref 0.8–5.3)
LYMPHOCYTES NFR BLD AUTO: 15.6 %
MCH RBC QN AUTO: 30.7 PG (ref 26.5–33)
MCHC RBC AUTO-ENTMCNC: 33.6 G/DL (ref 31.5–36.5)
MCV RBC AUTO: 91 FL (ref 78–100)
MONOCYTES # BLD AUTO: 0.7 10E9/L (ref 0–1.3)
MONOCYTES NFR BLD AUTO: 7.2 %
NEUTROPHILS # BLD AUTO: 7.6 10E9/L (ref 1.6–8.3)
NEUTROPHILS NFR BLD AUTO: 76.3 %
NRBC # BLD AUTO: 0 10*3/UL
NRBC BLD AUTO-RTO: 0 /100
PLATELET # BLD AUTO: 264 10E9/L (ref 150–450)
RBC # BLD AUTO: 4.27 10E12/L (ref 3.8–5.2)
RUPTURE OF FETAL MEMBRANES BY ROM PLUS: POSITIVE
SARS-COV-2 RNA SPEC QL NAA+PROBE: NEGATIVE
SARS-COV-2 RNA SPEC QL NAA+PROBE: NORMAL
SPECIMEN EXP DATE BLD: NORMAL
SPECIMEN SOURCE: NORMAL
SPECIMEN SOURCE: NORMAL
T PALLIDUM AB SER QL: NONREACTIVE
WBC # BLD AUTO: 9.9 10E9/L (ref 4–11)

## 2020-10-11 PROCEDURE — 120N000002 HC R&B MED SURG/OB UMMC

## 2020-10-11 PROCEDURE — 250N000009 HC RX 250

## 2020-10-11 PROCEDURE — 722N000001 HC LABOR CARE VAGINAL DELIVERY SINGLE

## 2020-10-11 PROCEDURE — U0003 INFECTIOUS AGENT DETECTION BY NUCLEIC ACID (DNA OR RNA); SEVERE ACUTE RESPIRATORY SYNDROME CORONAVIRUS 2 (SARS-COV-2) (CORONAVIRUS DISEASE [COVID-19]), AMPLIFIED PROBE TECHNIQUE, MAKING USE OF HIGH THROUGHPUT TECHNOLOGIES AS DESCRIBED BY CMS-2020-01-R: HCPCS | Performed by: ADVANCED PRACTICE MIDWIFE

## 2020-10-11 PROCEDURE — 250N000013 HC RX MED GY IP 250 OP 250 PS 637: Performed by: ADVANCED PRACTICE MIDWIFE

## 2020-10-11 PROCEDURE — G0463 HOSPITAL OUTPT CLINIC VISIT: HCPCS

## 2020-10-11 PROCEDURE — 86901 BLOOD TYPING SEROLOGIC RH(D): CPT | Performed by: ADVANCED PRACTICE MIDWIFE

## 2020-10-11 PROCEDURE — 258N000003 HC RX IP 258 OP 636

## 2020-10-11 PROCEDURE — 0HQ9XZZ REPAIR PERINEUM SKIN, EXTERNAL APPROACH: ICD-10-PCS | Performed by: ADVANCED PRACTICE MIDWIFE

## 2020-10-11 PROCEDURE — 86900 BLOOD TYPING SEROLOGIC ABO: CPT | Performed by: ADVANCED PRACTICE MIDWIFE

## 2020-10-11 PROCEDURE — 59400 OBSTETRICAL CARE: CPT | Performed by: ADVANCED PRACTICE MIDWIFE

## 2020-10-11 PROCEDURE — 86850 RBC ANTIBODY SCREEN: CPT | Performed by: ADVANCED PRACTICE MIDWIFE

## 2020-10-11 PROCEDURE — C9803 HOPD COVID-19 SPEC COLLECT: HCPCS

## 2020-10-11 PROCEDURE — 84112 EVAL AMNIOTIC FLUID PROTEIN: CPT | Performed by: ADVANCED PRACTICE MIDWIFE

## 2020-10-11 PROCEDURE — 85025 COMPLETE CBC W/AUTO DIFF WBC: CPT | Performed by: ADVANCED PRACTICE MIDWIFE

## 2020-10-11 PROCEDURE — 86780 TREPONEMA PALLIDUM: CPT | Performed by: ADVANCED PRACTICE MIDWIFE

## 2020-10-11 PROCEDURE — 250N000009 HC RX 250: Performed by: ADVANCED PRACTICE MIDWIFE

## 2020-10-11 RX ORDER — OXYTOCIN/0.9 % SODIUM CHLORIDE 30/500 ML
PLASTIC BAG, INJECTION (ML) INTRAVENOUS
Status: DISCONTINUED
Start: 2020-10-11 | End: 2020-10-11 | Stop reason: HOSPADM

## 2020-10-11 RX ORDER — OXYTOCIN 10 [USP'U]/ML
10 INJECTION, SOLUTION INTRAMUSCULAR; INTRAVENOUS
Status: DISCONTINUED | OUTPATIENT
Start: 2020-10-11 | End: 2020-10-13 | Stop reason: HOSPADM

## 2020-10-11 RX ORDER — ACETAMINOPHEN 325 MG/1
650 TABLET ORAL EVERY 4 HOURS PRN
Status: DISCONTINUED | OUTPATIENT
Start: 2020-10-11 | End: 2020-10-13 | Stop reason: HOSPADM

## 2020-10-11 RX ORDER — SODIUM CHLORIDE, SODIUM LACTATE, POTASSIUM CHLORIDE, CALCIUM CHLORIDE 600; 310; 30; 20 MG/100ML; MG/100ML; MG/100ML; MG/100ML
INJECTION, SOLUTION INTRAVENOUS CONTINUOUS
Status: DISCONTINUED | OUTPATIENT
Start: 2020-10-11 | End: 2020-10-11

## 2020-10-11 RX ORDER — IBUPROFEN 800 MG/1
800 TABLET, FILM COATED ORAL EVERY 6 HOURS PRN
Status: DISCONTINUED | OUTPATIENT
Start: 2020-10-11 | End: 2020-10-13 | Stop reason: HOSPADM

## 2020-10-11 RX ORDER — CARBOPROST TROMETHAMINE 250 UG/ML
250 INJECTION, SOLUTION INTRAMUSCULAR
Status: DISCONTINUED | OUTPATIENT
Start: 2020-10-11 | End: 2020-10-11

## 2020-10-11 RX ORDER — OXYCODONE AND ACETAMINOPHEN 5; 325 MG/1; MG/1
1 TABLET ORAL
Status: DISCONTINUED | OUTPATIENT
Start: 2020-10-11 | End: 2020-10-11

## 2020-10-11 RX ORDER — OXYTOCIN/0.9 % SODIUM CHLORIDE 30/500 ML
100 PLASTIC BAG, INJECTION (ML) INTRAVENOUS CONTINUOUS
Status: DISCONTINUED | OUTPATIENT
Start: 2020-10-11 | End: 2020-10-13 | Stop reason: HOSPADM

## 2020-10-11 RX ORDER — NALOXONE HYDROCHLORIDE 0.4 MG/ML
.1-.4 INJECTION, SOLUTION INTRAMUSCULAR; INTRAVENOUS; SUBCUTANEOUS
Status: DISCONTINUED | OUTPATIENT
Start: 2020-10-11 | End: 2020-10-13 | Stop reason: HOSPADM

## 2020-10-11 RX ORDER — MISOPROSTOL 200 UG/1
400 TABLET ORAL
Status: DISCONTINUED | OUTPATIENT
Start: 2020-10-11 | End: 2020-10-13 | Stop reason: HOSPADM

## 2020-10-11 RX ORDER — SODIUM CHLORIDE, SODIUM LACTATE, POTASSIUM CHLORIDE, CALCIUM CHLORIDE 600; 310; 30; 20 MG/100ML; MG/100ML; MG/100ML; MG/100ML
INJECTION, SOLUTION INTRAVENOUS
Status: COMPLETED
Start: 2020-10-11 | End: 2020-10-11

## 2020-10-11 RX ORDER — OXYTOCIN/0.9 % SODIUM CHLORIDE 30/500 ML
340 PLASTIC BAG, INJECTION (ML) INTRAVENOUS CONTINUOUS PRN
Status: DISCONTINUED | OUTPATIENT
Start: 2020-10-11 | End: 2020-10-13 | Stop reason: HOSPADM

## 2020-10-11 RX ORDER — MISOPROSTOL 200 UG/1
TABLET ORAL
Status: DISCONTINUED
Start: 2020-10-11 | End: 2020-10-11 | Stop reason: HOSPADM

## 2020-10-11 RX ORDER — AMOXICILLIN 250 MG
1 CAPSULE ORAL 2 TIMES DAILY
Status: DISCONTINUED | OUTPATIENT
Start: 2020-10-11 | End: 2020-10-13 | Stop reason: HOSPADM

## 2020-10-11 RX ORDER — METHYLERGONOVINE MALEATE 0.2 MG/ML
200 INJECTION INTRAVENOUS
Status: DISCONTINUED | OUTPATIENT
Start: 2020-10-11 | End: 2020-10-11

## 2020-10-11 RX ORDER — LIDOCAINE 40 MG/G
CREAM TOPICAL
Status: DISCONTINUED | OUTPATIENT
Start: 2020-10-11 | End: 2020-10-11

## 2020-10-11 RX ORDER — OXYTOCIN/0.9 % SODIUM CHLORIDE 30/500 ML
1-24 PLASTIC BAG, INJECTION (ML) INTRAVENOUS CONTINUOUS
Status: DISCONTINUED | OUTPATIENT
Start: 2020-10-11 | End: 2020-10-11

## 2020-10-11 RX ORDER — ACETAMINOPHEN 325 MG/1
650 TABLET ORAL EVERY 4 HOURS PRN
Status: DISCONTINUED | OUTPATIENT
Start: 2020-10-11 | End: 2020-10-11

## 2020-10-11 RX ORDER — MODIFIED LANOLIN
OINTMENT (GRAM) TOPICAL
Status: DISCONTINUED | OUTPATIENT
Start: 2020-10-11 | End: 2020-10-13 | Stop reason: HOSPADM

## 2020-10-11 RX ORDER — NALOXONE HYDROCHLORIDE 0.4 MG/ML
.1-.4 INJECTION, SOLUTION INTRAMUSCULAR; INTRAVENOUS; SUBCUTANEOUS
Status: DISCONTINUED | OUTPATIENT
Start: 2020-10-11 | End: 2020-10-11

## 2020-10-11 RX ORDER — AMOXICILLIN 250 MG
2 CAPSULE ORAL 2 TIMES DAILY
Status: DISCONTINUED | OUTPATIENT
Start: 2020-10-11 | End: 2020-10-13 | Stop reason: HOSPADM

## 2020-10-11 RX ORDER — OXYTOCIN/0.9 % SODIUM CHLORIDE 30/500 ML
100-340 PLASTIC BAG, INJECTION (ML) INTRAVENOUS CONTINUOUS PRN
Status: COMPLETED | OUTPATIENT
Start: 2020-10-11 | End: 2020-10-11

## 2020-10-11 RX ORDER — FENTANYL CITRATE 50 UG/ML
50-100 INJECTION, SOLUTION INTRAMUSCULAR; INTRAVENOUS
Status: DISCONTINUED | OUTPATIENT
Start: 2020-10-11 | End: 2020-10-11

## 2020-10-11 RX ORDER — BISACODYL 10 MG
10 SUPPOSITORY, RECTAL RECTAL DAILY PRN
Status: DISCONTINUED | OUTPATIENT
Start: 2020-10-13 | End: 2020-10-13 | Stop reason: HOSPADM

## 2020-10-11 RX ORDER — IBUPROFEN 800 MG/1
800 TABLET, FILM COATED ORAL
Status: DISCONTINUED | OUTPATIENT
Start: 2020-10-11 | End: 2020-10-11

## 2020-10-11 RX ORDER — OXYTOCIN 10 [USP'U]/ML
10 INJECTION, SOLUTION INTRAMUSCULAR; INTRAVENOUS
Status: DISCONTINUED | OUTPATIENT
Start: 2020-10-11 | End: 2020-10-11

## 2020-10-11 RX ORDER — OXYTOCIN 10 [USP'U]/ML
INJECTION, SOLUTION INTRAMUSCULAR; INTRAVENOUS
Status: DISCONTINUED
Start: 2020-10-11 | End: 2020-10-11 | Stop reason: HOSPADM

## 2020-10-11 RX ORDER — LIDOCAINE HYDROCHLORIDE 10 MG/ML
INJECTION, SOLUTION EPIDURAL; INFILTRATION; INTRACAUDAL; PERINEURAL
Status: COMPLETED
Start: 2020-10-11 | End: 2020-10-11

## 2020-10-11 RX ORDER — ONDANSETRON 2 MG/ML
4 INJECTION INTRAMUSCULAR; INTRAVENOUS EVERY 6 HOURS PRN
Status: DISCONTINUED | OUTPATIENT
Start: 2020-10-11 | End: 2020-10-11

## 2020-10-11 RX ORDER — HYDROCORTISONE 2.5 %
CREAM (GRAM) TOPICAL 3 TIMES DAILY PRN
Status: DISCONTINUED | OUTPATIENT
Start: 2020-10-11 | End: 2020-10-13 | Stop reason: HOSPADM

## 2020-10-11 RX ADMIN — IBUPROFEN 800 MG: 800 TABLET ORAL at 15:31

## 2020-10-11 RX ADMIN — IBUPROFEN 800 MG: 800 TABLET ORAL at 21:34

## 2020-10-11 RX ADMIN — OXYTOCIN-SODIUM CHLORIDE 0.9% IV SOLN 30 UNIT/500ML 340 ML/HR: 30-0.9/5 SOLUTION at 14:51

## 2020-10-11 RX ADMIN — ACETAMINOPHEN 650 MG: 325 TABLET, FILM COATED ORAL at 16:41

## 2020-10-11 RX ADMIN — ACETAMINOPHEN 650 MG: 325 TABLET, FILM COATED ORAL at 21:34

## 2020-10-11 RX ADMIN — Medication 2 MILLI-UNITS/MIN: at 10:50

## 2020-10-11 RX ADMIN — SODIUM CHLORIDE, SODIUM LACTATE, POTASSIUM CHLORIDE, CALCIUM CHLORIDE: 600; 310; 30; 20 INJECTION, SOLUTION INTRAVENOUS at 10:51

## 2020-10-11 RX ADMIN — LIDOCAINE HYDROCHLORIDE: 10 INJECTION, SOLUTION EPIDURAL; INFILTRATION; INTRACAUDAL; PERINEURAL at 15:07

## 2020-10-11 RX ADMIN — SODIUM CHLORIDE, POTASSIUM CHLORIDE, SODIUM LACTATE AND CALCIUM CHLORIDE: 600; 310; 30; 20 INJECTION, SOLUTION INTRAVENOUS at 10:51

## 2020-10-11 RX ADMIN — SENNOSIDES AND DOCUSATE SODIUM 1 TABLET: 8.6; 5 TABLET ORAL at 20:21

## 2020-10-11 ASSESSMENT — ACTIVITIES OF DAILY LIVING (ADL)
TOILETING_ISSUES: NO
FALL_HISTORY_WITHIN_LAST_SIX_MONTHS: NO

## 2020-10-11 ASSESSMENT — MIFFLIN-ST. JEOR: SCORE: 1613.22

## 2020-10-11 NOTE — H&P
ADMIT NOTE  =================  38w0d  Sakshi Parish is a 40 year old female     with an Patient's last menstrual period was 2020. and Estimated Date of Delivery: Oct 25, 2020 is [facility] on 10/11/2020 at 10:06 AM  in ruptured with no labor.   Fetal movement- active  ROM- yes, small clear   GBS- negative    HPI:  Patient reported SROM at 0530 10/10/20. She was not feeling contractions after the SROM but now she is feeling some contractions. Her ROM plus is positive so discussed about augmenting her labor with pitocin and she is agreeable. Her partner Duc is present.   ================    FOB- is involved, Duc  Other labor support- n/a    PRENATAL COURSE  =================  Prenatal course was   complicated by    Patient Active Problem List    Diagnosis Date Noted     Labor and delivery, indication for care 10/11/2020     Priority: Medium     Fetal cardiac anomaly affecting pregnancy, antepartum 2020     Priority: Medium     ** peds echo recommended**   Elsa had a low risk cell free DNA screen. Discussed that our cardiac imaging is limited due to fetal position and movement, but something seems different about the outflow  tracts. The pulmonary artery branching cannot be clearly identified arising from the RVOT. The 3VV and 3VT view are limited. I discussed with Elsa that I would like for Peds  Cards to evaluate the cardiac anatomy in more detail. The findings today may be consistent with a cardiac defect, or it may be normal but we can't get the appropriate  views due to fetal position.     Her  has a hx of a VSD that closed spontaneously.     Peds Cards echo has been arranged for tomorrow, . A follow up US is recommended in approx 3 weeks. We will discuss this further after her echocardiogram.       Need for Tdap vaccination 2020     Priority: Medium     Family history of malignant melanoma of skin 2017     Priority: Medium     Family  history of basal cell carcinoma 2017     Priority: Medium     Screening for cervical cancer 2016     Priority: Medium     will complete it in follow up       Goiter 2015     Priority: Medium     CARDIOVASCULAR SCREENING; LDL GOAL LESS THAN 160 10/31/2010     Priority: Medium        HISTORIES  ============  No Known Allergies  Past Medical History:   Diagnosis Date     ADD (attention deficit disorder)      Family history of malignant melanoma of skin 2/3/2017     Goiter 2015     Hx of migraines     late teens/20's     Past Surgical History:   Procedure Laterality Date     CHOLECYSTECTOMY     .  Family History   Problem Relation Age of Onset     Hypertension Maternal Grandfather      Cerebrovascular Disease Maternal Grandfather      Alzheimer Disease Maternal Grandfather      Hypertension Paternal Grandfather      Ovarian Cancer Maternal Grandmother              Skin Cancer Mother      Hypothyroidism Mother      Migraines Sister      Migraines Sister      Ovarian Cancer Maternal Aunt         66-     Skin Cancer Maternal Aunt      Social History     Tobacco Use     Smoking status: Never Smoker     Smokeless tobacco: Never Used   Substance Use Topics     Alcohol use: Not Currently     Alcohol/week: 0.0 standard drinks     Comment: occ     OB History    Para Term  AB Living   2 1 1 0 0 1   SAB TAB Ectopic Multiple Live Births   0 0 0 0 1      # Outcome Date GA Lbr Santiago/2nd Weight Sex Delivery Anes PTL Lv   2 Current            1 Term 17 37w4d 18:15 / 00:45 3.232 kg (7 lb 2 oz) M Vag-Spont Nitrous N ANASTASIA      Complications: Prolonged PROM (>18 hours)      Name: JAYLAN MOYER WALLACE      Apgar1: 8  Apgar5: 9        LABS:   ===========  Rhogam not indicated  Lab Results   Component Value Date    ABO A 2020       Lab Results   Component Value Date    RH Pos 2020     No results found for: RUBELLAABIGG   No results found for: RPR  No results found  "for: HIV  Lab Results   Component Value Date    HGB 12.7 10/02/2020      Lab Results   Component Value Date    HEPBANG Nonreactive 2020     Lab Results   Component Value Date    GBS Negative 10/02/2020     other labs- COVID-19 - pending    ROS  =========  Pt denies significant respiratory, cardiovacular, GI, or muscular/skeletalcomplaints.    See RN data base ROS.     PHYSICAL EXAM:  ===============  Pulse 97, temperature 98.1  F (36.7  C), temperature source Oral, resp. rate 20, height 1.715 m (5' 7.5\"), weight 90.3 kg (199 lb), last menstrual period 2020, not currently breastfeeding.  General appearance: comfortable  Heart: RRR without murmur  Lungs: clear to auscultation   Neuro: denies headache and visual disturbances  Psych: Mentation normal and bright   Legs: 2+/2+, no clonus, no edema      Abdomen: gravid, single vertex fetus, non-tender between contractions.  EFW-  6.5 lbs.   CONTACTIONS: Contractions occ.  Palpate: mild  FETAL HEART TONES: baseline 130 with moderate FHR variability and  pos accelerations. yes No decelerations present.      PELVIC EXAM: 3.5/ 90%/ Anterior/ soft/ -1   PATTON SCORE: 10  BLOODY SHOW: no   ROM: Yes  FLUID: clear    ASSESSMENT:  ==============  IUP @ 38w0d in in early labor and ruptured with no labor   NST REACTIVE  Fetal Heart rate tracing Category category one  GBS- negative     PLAN:  ===========  Discussed pain management options and patient prefers to cope with her own resources. She did use nitrous last time and would choose nitrous again if she needs something.   Admit - see IP orders  Labor induction with Pitocin  Anticipate      HCRISTOPHER Melendez    I was present with the ALBAROM student who participated in the service and in the documentation of the services provided. I have verified the history and personally performed the physical exam and medical decision making, as documented by the student and edited by me.     Alma Mcwilliams CNM, APRN GARY " CNM

## 2020-10-11 NOTE — TELEPHONE ENCOUNTER
SUBJECTIVE:  Feeling a bit cramping now.    OBJECTIVE:  ROM x 14 hrs based on pt report.  Minimal fluid but increased cramping. GBS is negative.    Pt had ROM with her 1st child and went into spontaneous labor without augmentation.      ASSESSMENT: 37w6d     PLAN: Pt will continue to labor at home and if not in labor by am will come in for confirmation of ROM and augmentation as needed.

## 2020-10-11 NOTE — L&D DELIVERY NOTE
OB Vaginal Delivery Note  Brief Labor Course:   Elsa came to the birthplace after being ruptured for more than 24 hours without labor. Upon arrival she was 3.5/90/-1 and pitocin was initiated to help augment labor. Shortly after pitocin was started she  was feeling a lot of pressure and done another SVE 8-9/100/0. Very soon after patient had an urge to push, and pushed for 4 pushes and baby had bradycardia during pushing but progress was fast. Baby was born was placed on mom's abdomen and stimulated and became vigorous. Patient had 1st degree laceration and repaired. Placenta was delivered with three vessels via Glaser mechanism. Baby and mother are doing well and bonding. FOB at bedside supportive.    Sakshi Parish MRN# [id number]   Age: 40 year old YOB: 1980       GA: 38w0d  GP:   Labor Complications: Prolonged PROM (>18 hours)   EBL:   mL  Delivery QBL:    Delivery Type: Vaginal, Spontaneous   ROM to Delivery Time: (Delivered) Days: 1 Hours: 9 Minutes: 13  [place] Weight:     1 Minute 5 Minute 10 Minute   Apgar Totals: 8   9        NINI VERGARA;DAREN JAQUEZ;ANA M SNIDER     Delivery Details:  Sakshi Parish, a 40 year old  female delivered a viable infant with apgars of 8  and 9 . Patient was fully dilated and pushing after   hours   minutes in active labor. Delivery was via vaginal, spontaneous  to a sterile field under   anesthesia. Infant delivered in vertex  left  occiput  anterior  position. Anterior and posterior shoulders delivered without difficulty. The cord was clamped, cut twice and   were noted. Cord blood was obtained in routine fashion with the following disposition:  .      Cord complications:    Placenta delivered at  . Placental disposition was Hospital disposal . Fundal massage performed and fundus found to be firm.     Episiotomy: none    Perineum, vagina, cervix were inspected, and the following lacerations were noted:   Perineal  lacerations: 1st   periurethral laceration: right             Any lacerations were repaired in the usual fashion using 3-0 and 4-0 vicryl.    Excellent hemostasis was noted. Needle count correct. Infant and patient in delivery room in good and stable condition.        Jose A Parish [[id number]]    Rupture date/time: 10/10/20 0530   Rupture type: Spontaneous rupture of membranes prior to induction  Fluid color: Clear     Delivery/Placenta Date and Time    Delivery Date: 10/11/20 Delivery Time:  2:43 PM      Apgars    Living status: Living   1 Minute 5 Minute 10 Minute 15 Minute 20 Minute   Skin color: 0  1       Heart rate: 2  2       Reflex irritability: 2  2       Muscle tone: 2  2       Respiratory effort: 2  2       Total: 8  9       Apgars assigned by: ALISON ANDREWS RN      Resuscitation     Care at Delivery: Infant dried and stimulated on mom's abdomen.  Lusty cry with stimulation.  Delayed cord clamping for 2.5 minutes. Infant has strong tone, pink with acrocyanosis, regular heart rate and respirations. Infant placed skin to skin on mom's chest for continued recovery.  Output in Delivery Room: Voided     Skin to Skin and Feeding Plan    Skin to skin initiation date/time: 1/10/1841    Skin to skin with: Mother  Skin to skin end date/time:        Labor Events and Shoulder Dystocia    Fetal Tracing Prior to Delivery: Category 2  Fetal Tracing Comments: Second stage bradycardia      Delivery (Maternal) (Provider to Complete) (133213)    Episiotomy: None  Perineal lacerations: 1st Repaired?: Yes   Periurethral laceration: right Repaired?: Yes      Blood Loss  Mother: Elsa Parish #[id number]   Start of Mother's Information    IO Blood Loss  10/11/20 0243 - 10/11/20 1528    None           End of Mother's Information  Mother: Elsa Parish #[id number]          Delivery - Provider to Complete ([id number])    Delivering clinician: Alma Mcwilliams APRN CNM  CNM Care:  Exclusive CNM care in labor  Attempted Delivery Types (Choose all that apply): Spontaneous Vaginal Delivery  Delivery Type (Choose the 1 that will go to the Birth History): Vaginal, Spontaneous                   Other personnel:  Provider Role   [place], Yamilet Student   Yash Pop, RN Registered Nurse   Akua Lambert RN Registered Nurse                Placenta    Delayed Cord Clamping: Done  Removal: Spontaneous  Disposition: Hospital disposal           Presentation and Position    Presentation: Vertex    Position: Left Occiput Anterior             CHRISTOPHER Melendez  I have reviewed and verified the documentation as completed by Yamilet Wren, nurse midwife student, of the procedure which I personally performed.     Alma Mcwilliams CNM, APRN GARY Mcwilliams CNM, YUMIKO VEGA

## 2020-10-11 NOTE — PLAN OF CARE
Delivered viable male at 1443 apgars 8/9 infant skin to skin immediately, first degree and geneva urethral lacerations repaired per delivery summary, qbl 150ml fundus and lochia wdl

## 2020-10-11 NOTE — TELEPHONE ENCOUNTER
Caller is complaining of a gush of water this am around 5am. Caller states she is having abdominal cramping, but no contractions currently. This is patient's second child. Triage guidelines recommend to go to labor and delivery. Triager called out to answering service, left message for on call midwife Oj to call patient at [phone]. Caller states she will head over to labor and delivery.  COVID 19 Nurse Triage Plan/Patient Instructions    Please be aware that novel coronavirus (COVID-19) may be circulating in the community. If you develop symptoms such as fever, cough, or SOB or if you have concerns about the presence of another infection including coronavirus (COVID-19), please contact your health care provider or visit [email or web address].     Disposition/Instructions    In-Person Visit with provider recommended. Reference Visit Selection Guide.    Thank you for taking steps to prevent the spread of this virus.  o Limit your contact with others.  o Wear a simple mask to cover your cough.  o Wash your hands well and often.    Resources    [facility]: About COVID-19: [email or web address]    CDC: What to Do If You're Sick: www.cdc.gov/coronavirus/2019-ncov/about/steps-when-sick.html    CDC: Ending Home Isolation: www.cdc.gov/coronavirus/2019-ncov/hcp/disposition-in-home-patients.html     CDC: Caring for Someone: www.cdc.gov/coronavirus/2019-ncov/if-you-are-sick/care-for-someone.html     [facility]: Interim Guidance for Hospital Discharge to Home: www.health.[facility].mn.us/diseases/coronavirus/hcp/hospdischarge.pdf    [facility] clinical trials (COVID-19 research studies): clinicalaffairs.[facility]/umn-clinical-trials     Below are the COVID-19 hotlines at the [place] [facility] ([facility]). Interpreters are available.   o For health questions: Call 787-856-6640 or 1-817.833.6482 (7 a.m. to 7 p.m.)  o For questions about schools and childcare: Call 278-992-9493 or 1-557.524.1384 (7 a.m.  "to 7 p.m.)                     Reason for Disposition    [1] Leakage of fluid from vagina AND [2] leakage started > 4 hours ago    Additional Information    Negative: Shock suspected (e.g., cold/pale/clammy skin, too weak to stand, low BP, rapid pulse)    Negative: Difficult to awaken or acting confused (e.g., disoriented, slurred speech)    Negative: Passed out (i.e., lost consciousness, collapsed and was not responding)    Negative: [1] SEVERE abdominal pain (e.g., excruciating) AND [2] constant AND [3] present > 1 hour    Negative: Severe bleeding (e.g., continuous red blood from vagina, or large blood clots)    Negative: Umbilical cord hanging out of the vagina (shiny, white, curled appearance, \"like telephone cord\")    Negative: Uncontrollable urge to push (i.e., feels like baby is coming out now)    Negative: Can see baby    Negative: Sounds like a life-threatening emergency to the triager    Negative: Pregnant < 37 weeks (i.e., )    Negative: [1] Uncertain delivery date AND [2] possibly pregnant < 37 weeks (i.e., )    Negative: [1] First baby (primipara) AND [2] contractions < 6 minutes apart  AND [3] present 2 hours    Negative: [1] History of prior delivery (multipara) AND [2] contractions < 10 minutes apart AND [3] present 1 hour    Negative: [1] History of rapid prior delivery AND [2] contractions < 10 minutes apart    Negative: [1] Leakage of fluid from vagina AND [2] green or brown in color    Protocols used: PREGNANCY - LABOR-A-AH      "

## 2020-10-11 NOTE — PROGRESS NOTES
"  S:Elsa is feeling the contractions but coping well. The contractions are getting closer.     O:  Blood pressure 118/67, pulse 84, temperature 98.1  F (36.7  C), temperature source Oral, resp. rate 16, height 1.715 m (5' 7.5\"), weight 90.3 kg (199 lb), last menstrual period 2020, not currently breastfeeding.  General appearance: uncomfortable with contractions  CONTACTIONS: Contractions every 2-3 minutes.  Palpate: moderate  FETAL HEART TONES: baseline 130 with moderate FHR variability and    accelerations yes. Decelerations no.    NST: REACTIVE  ROM: clear fluid  PELVIC EXAM:deferred  Fetal Position:  Cephalic  Bloody show: No  Pitocin- 3 mL/hr,  Antibiotics- none  Cervical ripening: N/A  ASSESSMENT:  ==============  IUP @ 38w0d rupture of membranes more than 24 hrs, early labor  Fetal Heart rate tracing category one  GBS- negative     PLAN:  ===========  comfort measures prn   Pain medication - patient would like nitrous when her contractions get uncomfortable  Anticipate   Labor augmentation with Pitocin  reevaluate in 2-4 hours/PRN     CHRISTOPHER Melendez    I was present with the CNM student who participated in the service and in the documentation of the services provided. I have verified the history and personally performed the physical exam and medical decision making, as documented by the student and edited by me.     Alma Mcwilliams, GARY, APRN GARY VEGA      "

## 2020-10-11 NOTE — PLAN OF CARE
Patient arrived to [facility] unit via wheelchair at 1820, with belongings, accompanied by EVENS Gonzáles and spouse/ significant other, with infant in arms. Received report from  EVENS Gonzáles  and double checked bands. Unit and room orientation started. Call light given and within arms reach; no concerns present at this time. Continue with plan of care.

## 2020-10-11 NOTE — PLAN OF CARE
Pt arrived for admission following  SROM at home  yesterday 10/10/20at 0530. Efm/toco applied, admision assessment completed GARY Mcwilliams notified of status. Covid swab done and ROM+ sent. Cervix 3-4/90/--1 by  GARY

## 2020-10-11 NOTE — PROGRESS NOTES
Fetal Non-Stress Test Results    NST Ordered By: Alma Mcwilliams CNM, YUMIKO VEGA         NST Start & Stop Times                                  NST Results  Fetus A   Baseline Rate: 130  Accelerations: Present  Decelerations: None  Interpretation: reactive    Alma Mcwilliams CNM, YUMIKO VEGA

## 2020-10-12 LAB — HGB BLD-MCNC: 12.1 G/DL (ref 11.7–15.7)

## 2020-10-12 PROCEDURE — 36415 COLL VENOUS BLD VENIPUNCTURE: CPT | Performed by: ADVANCED PRACTICE MIDWIFE

## 2020-10-12 PROCEDURE — 250N000013 HC RX MED GY IP 250 OP 250 PS 637: Performed by: ADVANCED PRACTICE MIDWIFE

## 2020-10-12 PROCEDURE — 85018 HEMOGLOBIN: CPT | Performed by: ADVANCED PRACTICE MIDWIFE

## 2020-10-12 PROCEDURE — 120N000002 HC R&B MED SURG/OB UMMC

## 2020-10-12 RX ORDER — ACETAMINOPHEN 325 MG/1
650 TABLET ORAL EVERY 6 HOURS PRN
COMMUNITY
Start: 2020-10-12 | End: 2020-11-11

## 2020-10-12 RX ORDER — IBUPROFEN 600 MG/1
600 TABLET, FILM COATED ORAL EVERY 6 HOURS PRN
COMMUNITY
Start: 2020-10-12 | End: 2020-11-11

## 2020-10-12 RX ORDER — AMOXICILLIN 250 MG
1 CAPSULE ORAL DAILY
Qty: 30 TABLET | Refills: 0 | Status: SHIPPED | OUTPATIENT
Start: 2020-10-12 | End: 2020-11-11

## 2020-10-12 RX ADMIN — ACETAMINOPHEN 650 MG: 325 TABLET, FILM COATED ORAL at 19:40

## 2020-10-12 RX ADMIN — SENNOSIDES AND DOCUSATE SODIUM 1 TABLET: 8.6; 5 TABLET ORAL at 07:54

## 2020-10-12 RX ADMIN — IBUPROFEN 800 MG: 800 TABLET ORAL at 10:04

## 2020-10-12 RX ADMIN — IBUPROFEN 800 MG: 800 TABLET ORAL at 03:30

## 2020-10-12 RX ADMIN — IBUPROFEN 800 MG: 800 TABLET ORAL at 23:04

## 2020-10-12 RX ADMIN — ACETAMINOPHEN 650 MG: 325 TABLET, FILM COATED ORAL at 01:47

## 2020-10-12 RX ADMIN — ACETAMINOPHEN 650 MG: 325 TABLET, FILM COATED ORAL at 23:50

## 2020-10-12 RX ADMIN — IBUPROFEN 800 MG: 800 TABLET ORAL at 16:58

## 2020-10-12 RX ADMIN — ACETAMINOPHEN 650 MG: 325 TABLET, FILM COATED ORAL at 05:20

## 2020-10-12 RX ADMIN — ACETAMINOPHEN 650 MG: 325 TABLET, FILM COATED ORAL at 15:34

## 2020-10-12 RX ADMIN — DOCUSATE SODIUM 50 MG AND SENNOSIDES 8.6 MG 2 TABLET: 8.6; 5 TABLET, FILM COATED ORAL at 19:40

## 2020-10-12 NOTE — PLAN OF CARE
Stable and breastfeeding independently on demand.  Bonding well with baby. Will continue with plan of care.

## 2020-10-12 NOTE — PLAN OF CARE
Data: Vital signs within normal limits. Postpartum checks within normal limits - see flow record. Patient eating and drinking normally. Patient able to empty bladder independently and is up ambulating. No apparent signs of infection.Patient performing self cares and is able to care for infant.  Action: Patient medicated during the shift for pain and cramping. See MAR. Patient reassessed within 1 hour after each medication and pain was improved - patient stated she was comfortable.   Response: Positive attachment behaviors observed with infant. Support person, ,  present.

## 2020-10-12 NOTE — PROGRESS NOTES
"Post Partum Note    Elsa is feeling well this morning, just tired and sore. Her bleeding has been less than she expected. She is breastfeeding without complaint. She is up in the room and able to care for herself and her baby. Her partner is present and supportive.    SIGNIFICANT PROBLEMS:  Patient Active Problem List    Diagnosis Date Noted     Labor and delivery, indication for care 10/11/2020     Priority: Medium     Fetal cardiac anomaly affecting pregnancy, antepartum 2020     Priority: Medium     ** peds echo recommended**   Elsa had a low risk cell free DNA screen. Discussed that our cardiac imaging is limited due to fetal position and movement, but something seems different about the outflow  tracts. The pulmonary artery branching cannot be clearly identified arising from the RVOT. The 3VV and 3VT view are limited. I discussed with Elsa that I would like for Peds  Cards to evaluate the cardiac anatomy in more detail. The findings today may be consistent with a cardiac defect, or it may be normal but we can't get the appropriate  views due to fetal position.     Her  has a hx of a VSD that closed spontaneously.     Peds Cards echo has been arranged for tomorrow, . A follow up US is recommended in approx 3 weeks. We will discuss this further after her echocardiogram.       Need for Tdap vaccination 2020     Priority: Medium     Family history of malignant melanoma of skin 2017     Priority: Medium     Family history of basal cell carcinoma 2017     Priority: Medium     Screening for cervical cancer 2016     Priority: Medium     will complete it in follow up       Goiter 2015     Priority: Medium     CARDIOVASCULAR SCREENING; LDL GOAL LESS THAN 160 10/31/2010     Priority: Medium       INTERVAL HISTORY:  /71   Pulse 77   Temp 97.5  F (36.4  C) (Oral)   Resp 16   Ht 1.715 m (5' 7.5\")   Wt 90.3 kg (199 lb)   LMP 2020   SpO2 98%   " Breastfeeding Unknown   BMI 30.71 kg/m    Pt stable, baby is rooming in  Breast feeding status:initiated  Complications since 2 hours post delivery: None  Patient is tolerating acitivity well Voiding without difficulty, cramping is relieved by Ibuprophen, lochia is decreasing and patient denies clots.  Perineal pain is is relieved by Ibuprophen.  The perineum laceration is well approximated    Postpartum hemoglobin   Hemoglobin   Date Value Ref Range Status   10/12/2020 12.1 11.7 - 15.7 g/dL Final     Blood type   Lab Results   Component Value Date    ABO A 10/11/2020       Lab Results   Component Value Date    RH Pos 10/11/2020     Rubella immune  History of depression: denies    ASSESSMENT/PLAN:  Normal postpartum exam   Stable Post-partum day #1  Complications:none  Plan d/c home this evening or tomorrow morning, depending on how the day goes with infant testing and feeding.  RTC 6 weeks  Teaching done: D/C Instructions: Nutrition/Activity, Engorgement Management, Birth Control Options, Warning Signs/When to Call: Excessive Bleeding, Infection, PP Depression, Kegals and Crunches, RTC Clinic for PP Appointment and PNV  Birthcontrol planned:Nexplanon at 6w postpartum check    Current Discharge Medication List      START taking these medications    Details   acetaminophen (TYLENOL) 325 MG tablet Take 2 tablets (650 mg) by mouth every 6 hours as needed for mild pain Start after Delivery.    Associated Diagnoses:  (normal spontaneous vaginal delivery)      ibuprofen (ADVIL/MOTRIN) 600 MG tablet Take 1 tablet (600 mg) by mouth every 6 hours as needed for moderate pain Start after delivery    Associated Diagnoses:  (normal spontaneous vaginal delivery)      senna-docusate (SENOKOT-S/PERICOLACE) 8.6-50 MG tablet Take 1 tablet by mouth daily Start after delivery.  Qty: 30 tablet, Refills: 0    Associated Diagnoses:  (normal spontaneous vaginal delivery)         CONTINUE these medications which have NOT  CHANGED    Details   fish oil-omega-3 fatty acids (FISH OIL) 1000 MG capsule Take 2 g by mouth daily      Flaxseed, Linseed, (FLAXSEED OIL PO)       lactobacillus rhamnosus, GG, (CULTURELL) capsule Take 1 capsule by mouth 2 times daily      Prenatal Vit-Fe Fumarate-FA (PRENATAL VITAMIN PO)       VITAMIN D, CHOLECALCIFEROL, PO Take 1,000 Units by mouth daily           Cliff Maier CNM

## 2020-10-12 NOTE — PLAN OF CARE
VSS and postpartum assessments WDL. Fundus firm at U/U. Scant lochia. Voiding without difficulty and tolerating regular diet. Up and ambulating with steady gait. Independent with cares. Bonding well with infant. Breastfeeding on cue and also supplementing with maternal expressed/pumped milk. Pain managed with tylenol and Ibuprofen. Will continue with postpartum cares and education per plan of care.

## 2020-10-13 VITALS
TEMPERATURE: 98.7 F | WEIGHT: 199 LBS | OXYGEN SATURATION: 98 % | BODY MASS INDEX: 30.16 KG/M2 | HEIGHT: 68 IN | RESPIRATION RATE: 16 BRPM | SYSTOLIC BLOOD PRESSURE: 106 MMHG | DIASTOLIC BLOOD PRESSURE: 74 MMHG | HEART RATE: 86 BPM

## 2020-10-13 PROCEDURE — 250N000013 HC RX MED GY IP 250 OP 250 PS 637: Performed by: ADVANCED PRACTICE MIDWIFE

## 2020-10-13 RX ADMIN — ACETAMINOPHEN 650 MG: 325 TABLET, FILM COATED ORAL at 03:57

## 2020-10-13 RX ADMIN — SENNOSIDES AND DOCUSATE SODIUM 1 TABLET: 8.6; 5 TABLET ORAL at 08:48

## 2020-10-13 RX ADMIN — IBUPROFEN 800 MG: 800 TABLET ORAL at 05:08

## 2020-10-13 RX ADMIN — ACETAMINOPHEN 650 MG: 325 TABLET, FILM COATED ORAL at 08:48

## 2020-10-13 NOTE — PLAN OF CARE
Patient is stable and discharge to home today with baby. Discharge education and instructions reviewed and questions were addressed.

## 2020-10-13 NOTE — PLAN OF CARE
VSS. Afebrile. Uterus firm and midline, bleeding is minimal. Breastfeeding baby on demand, baby seemed to cluster feed overnight so Elsa got little sleep, but sleeping soundly when checked on at 0700. Bonding with baby Max observed. Taking Tylenol and Ibuprofen for cramping. Significant other Duc at bedside. Continue to monitor.

## 2020-10-13 NOTE — PROGRESS NOTES
"Post Partum Note    Sakshi Parish      MRN#: [id number]  Age: 40 year old      YOB: 1980      Post-partum day #2    SIGNIFICANT PROBLEMS:  Patient Active Problem List    Diagnosis Date Noted     Labor and delivery, indication for care 10/11/2020     Priority: Medium     Fetal cardiac anomaly affecting pregnancy, antepartum 2020     Priority: Medium     ** peds echo recommended**   Elsa had a low risk cell free DNA screen. Discussed that our cardiac imaging is limited due to fetal position and movement, but something seems different about the outflow  tracts. The pulmonary artery branching cannot be clearly identified arising from the RVOT. The 3VV and 3VT view are limited. I discussed with Elsa that I would like for Peds  Cards to evaluate the cardiac anatomy in more detail. The findings today may be consistent with a cardiac defect, or it may be normal but we can't get the appropriate  views due to fetal position.     Her  has a hx of a VSD that closed spontaneously.     Peds Cards echo has been arranged for tomorrow, . A follow up US is recommended in approx 3 weeks. We will discuss this further after her echocardiogram.       Need for Tdap vaccination 2020     Priority: Medium     Family history of malignant melanoma of skin 2017     Priority: Medium     Family history of basal cell carcinoma 2017     Priority: Medium     Screening for cervical cancer 2016     Priority: Medium     will complete it in follow up       Goiter 2015     Priority: Medium     CARDIOVASCULAR SCREENING; LDL GOAL LESS THAN 160 10/31/2010     Priority: Medium       INTERVAL HISTORY:  /74   Pulse 86   Temp 98.7  F (37.1  C)   Resp 16   Ht 1.715 m (5' 7.5\")   Wt 90.3 kg (199 lb)   LMP 2020   SpO2 98%   Breastfeeding Unknown   BMI 30.71 kg/m      Pt stable, baby is rooming in  Breast feeding status:initiated  Complications since 2 " hours post delivery: None  Patient is tolerating acitivity well Voiding without difficulty, cramping is relieved by Ibuprophen, lochia is decreasing and patient denies clots.  Perineal pain is is minimal.  The perineum laceration is well approximated    Normal postpartum exam,  Prolonged ROM    Postpartum hemoglobin   Hemoglobin   Date Value Ref Range Status   10/12/2020 12.1 11.7 - 15.7 g/dL Final     Blood type   Lab Results   Component Value Date    ABO A 10/11/2020       Lab Results   Component Value Date    RH Pos 10/11/2020     Rubella status No results found for: RUBELLAABIGG    ASSESSMENT/PLAN:  Stable Post-partum day #2  Complications:none  Plan d/c home today  RTC 6 weeks  Teaching done: D/C Instructions: Nutrition/Activity, Engorgement Management, Birth Control Options, Warning Signs/When to Call: Excessive Bleeding, Infection, PP Depression, Kegals and Crunches, RTC Clinic for PP Appointment and PNV    Postpartum warning s/s reviewed, including bleeding/clots, fever, mastitis, or depression    Birthcontrol planned:Nexplanon  Current Discharge Medication List      START taking these medications    Details   acetaminophen (TYLENOL) 325 MG tablet Take 2 tablets (650 mg) by mouth every 6 hours as needed for mild pain Start after Delivery.    Associated Diagnoses:  (normal spontaneous vaginal delivery)      ibuprofen (ADVIL/MOTRIN) 600 MG tablet Take 1 tablet (600 mg) by mouth every 6 hours as needed for moderate pain Start after delivery    Associated Diagnoses:  (normal spontaneous vaginal delivery)      senna-docusate (SENOKOT-S/PERICOLACE) 8.6-50 MG tablet Take 1 tablet by mouth daily Start after delivery.  Qty: 30 tablet, Refills: 0    Associated Diagnoses:  (normal spontaneous vaginal delivery)         CONTINUE these medications which have NOT CHANGED    Details   fish oil-omega-3 fatty acids (FISH OIL) 1000 MG capsule Take 2 g by mouth daily      Flaxseed, Linseed, (FLAXSEED OIL PO)        lactobacillus rhamnosus, GG, (CULTURELL) capsule Take 1 capsule by mouth 2 times daily      Prenatal Vit-Fe Fumarate-FA (PRENATAL VITAMIN PO)       VITAMIN D, CHOLECALCIFEROL, PO Take 1,000 Units by mouth daily

## 2020-10-14 ENCOUNTER — MEDICAL CORRESPONDENCE (OUTPATIENT)
Dept: HEALTH INFORMATION MANAGEMENT | Facility: CLINIC | Age: 40
End: 2020-10-14

## 2020-10-28 ENCOUNTER — MEDICAL CORRESPONDENCE (OUTPATIENT)
Dept: HEALTH INFORMATION MANAGEMENT | Facility: CLINIC | Age: 40
End: 2020-10-28

## 2020-12-13 ENCOUNTER — HEALTH MAINTENANCE LETTER (OUTPATIENT)
Age: 40
End: 2020-12-13

## 2020-12-28 ENCOUNTER — PRENATAL OFFICE VISIT (OUTPATIENT)
Dept: MIDWIFE SERVICES | Facility: CLINIC | Age: 40
End: 2020-12-28
Payer: COMMERCIAL

## 2020-12-28 VITALS
DIASTOLIC BLOOD PRESSURE: 82 MMHG | HEART RATE: 86 BPM | WEIGHT: 183 LBS | BODY MASS INDEX: 28.24 KG/M2 | TEMPERATURE: 98.6 F | SYSTOLIC BLOOD PRESSURE: 119 MMHG

## 2020-12-28 DIAGNOSIS — Z80.41 FAMILY HISTORY OF MALIGNANT NEOPLASM OF OVARY: ICD-10-CM

## 2020-12-28 DIAGNOSIS — Z97.5 NEXPLANON IN PLACE: ICD-10-CM

## 2020-12-28 DIAGNOSIS — Z30.017 NEXPLANON INSERTION: ICD-10-CM

## 2020-12-28 LAB — HCG UR QL: NEGATIVE

## 2020-12-28 PROCEDURE — 99207 PR POST PARTUM EXAM: CPT | Performed by: ADVANCED PRACTICE MIDWIFE

## 2020-12-28 PROCEDURE — 81025 URINE PREGNANCY TEST: CPT | Performed by: ADVANCED PRACTICE MIDWIFE

## 2020-12-28 PROCEDURE — 11981 INSERTION DRUG DLVR IMPLANT: CPT | Performed by: ADVANCED PRACTICE MIDWIFE

## 2020-12-28 ASSESSMENT — PATIENT HEALTH QUESTIONNAIRE - PHQ9: SUM OF ALL RESPONSES TO PHQ QUESTIONS 1-9: 4

## 2020-12-28 NOTE — PROGRESS NOTES
SUBJECTIVE:   Sakshi Parish is a  here for her 6-week postpartum checkup.     HPI: Sakshi feels well, is adjusting to life with her  and has experienced a normal recovery without pain or complications. She does notice that sometimes she feels really stuck, both because of the pandemic and winter and also that her baby will cry if anyone else holds him. She wonders if she will feel better when she returns to work. Encouraged to call if depression or anxiety about this should worsen.     Does have [place] [facility] [place] [facility]. Wonders if she needs extra surveillance for ovarian cancer- referral given for genetic counselling.     Today's Depression Rating was No Value exists for the : PHQ-9 is 4    DELIVERY DATE: 10/11/20   of a viable boy, weight 8 pounds 3 oz., with no complications.  FEEDING METHOD:    CONTRACEPTION PLANNED: Nexplanon  She  has had intercourse since delivery and complains of mild discomfort.  HX OF DEPRESSION:No  HX OF ABUSE:No  OTHER HPI: She has no signs of infection, bleeding or other complications. Bleeding stopped, epis/lac healing well.         EXAM:  /82 (BP Location: Left arm, Patient Position: Sitting, Cuff Size: Adult Regular)   Pulse 86   Temp 98.6  F (37  C) (Oral)   Wt 83 kg (183 lb)   LMP 2020   BMI 28.24 kg/m    GENERAL APPEARANCE: healthy, alert and no distress  NECK: thyroid normal to palpation  BREAST: soft, nontender, nipples intact, lactating   ABDOMEN: soft, nontender, diastasis recti closing  PSYCH: mentation appears normal and affect normal/bright  PELVIC EXAM: deferred          ASSESSMENT:   Normal postpartum exam after .    PLAN:  Birth Control as ordered. Fertility reviewed.    Return as needed or at time interval for next routine pap, pelvic, or breast exam.  Encourage Kegals and abdominal exercise. Slow, steady weight loss.  Continue a multi vitamin supplement, especially if  breastfeeding.  Pap smear was not obtained.  GC/CHLAMYDIA CULTURE OBTAINED:PATIENT DECLINED  Post partum Hgb was not obtained.    Alma Mcwilliams, GARY, YUMIKO IRVINM,CNM    NEXPLANON INSERTION PROCEDURE    Sakshi Parish is a 40 year old  who presents for Nexplanon insertion. The  patient's last menstrual period was prior to her baby.  The patient is currently using nothing  for contraception.     Tests:  UPT negative    A complete discussion of the risks and benefits of nexplanon use and the details of the insertion procedure was held with the patient.  All questions were answered.  A consent form was signed.      Prior to the beginning of the procedure the team paused to verify the patient's identity, as well as the procedure to be performed and the correct side/site.  All equipment required was ready and available. The patient was positioned appropriately.     Preprocedure medications: 1% plain lidocaine, 1-2 ml  Nexplanon Lot # Neplanon Placement NDC:3908-1483-01 LOT: [id number] EXP:3/19/2023    Patients allergies were confirmed.  The patient was placed in the supine position with her left (non-dominant) arm flexed at the elbow, externally rotated, and placed with her wrist parallel to her ear.  The insertion site was identified 6-8 cm above the elbow crease at the inner aspect overlying the bicepital groove.  The insertion site was marked with a sterile marker. The direction of insertion was also indicated with a radha 6-8 cm proximal in the bicepital groove.  The insertion area was cleaned with betadine swabs and anesthetized with 2 cc of 1% lidocaine without epinephrine.  The Nexplanon was removed from its blister.  With the shield on, the jordan was visible inside the needle tip.  The needle shield was removed.  Counter-traction was applied to the skin at the marked needle insertion site.  The tip of the needle was inserted at the site at a slight angle.  The applicator was then lowered to a  horizontal position.  The needle was inserted to its full length, keeping the needle parallel to the surface of the skin and the skin tented. The applicator button was pressed and the the needle retracted within the device leaving the Nexplanon jordan under the skin.  The 4 cm jordan was palpated under the skin.  The patient also palpated the jordan.  A pressure bandage was applied with sterile gauze. The patient was instructed to remove the bangage in several hours and replace with a band-aid.    The user card was filled out and given to the patient to keep.  The Patient Chart Label was completed and sent for scanning.    PLAN:   The patient was asked to contact the clinic for any fever/chills/severe pelvic or abdominal pain or heavy bleeding.     FOLLOW-UP:  She was asked to follow up for any problems.     Alma Mcwilliams CNM, YUMIKO VEGA CNM

## 2020-12-28 NOTE — NURSING NOTE
"Chief Complaint   Patient presents with     Postpartum Care     Neplanon Placement NDC:4291-1594-20 LOT: [id number] EXP:3/19/2023  Initial /82 (BP Location: Left arm, Patient Position: Sitting, Cuff Size: Adult Regular)   Pulse 86   Temp 98.6  F (37  C) (Oral)   Wt 83 kg (183 lb)   LMP 2020   BMI 28.24 kg/m   Estimated body mass index is 28.24 kg/m  as calculated from the following:    Height as of 10/11/20: 1.715 m (5' 7.5\").    Weight as of this encounter: 83 kg (183 lb).  BP completed using cuff size: regular    Questioned patient about current smoking habits.  Pt. has never smoked.          The following HM Due: NONE      The following patient reported/Care Every where data was sent to:  P ABSTRACT QUALITY INITIATIVES [[id number]]  MELANIA De Jesus MA           "

## 2020-12-30 ENCOUNTER — TELEPHONE (OUTPATIENT)
Dept: MIDWIFE SERVICES | Facility: CLINIC | Age: 40
End: 2020-12-30

## 2020-12-30 NOTE — LETTER
"    December 30, 2020       TO: Sakshimarcelo Samuel Марина  [address]  [place] [address]         Dear Ms. Parish,    Our records indicate that you have not scheduled an appointment for a consult, as recommended by YUMIKO Young CNM. If you wish to schedule within [facility], we have several options to help you schedule your appointment:      Call [phone]    Visit our website at [email or web address] and click \"I Want To\" (in upper right) and select Request an Appointment    Request an appointment via [employer] at [email or web address]     If you have chosen to schedule elsewhere or if you have already made an appointment, please disregard this letter.    If you have any questions or concerns regarding the information above, please contact the [facility] at [phone].      Sincerely,      [facility]                  "

## 2020-12-30 NOTE — TELEPHONE ENCOUNTER
Oncology/Surgical Oncology Referral Request:     Specialty Requested: Medical Oncology     Referring Provider: Alma Mcwilliams APRN CNM    Referring Clinic/Organization: [facility]    Records location: [facility]     Requested Provider (if specified): Not Specified       Called pt x 2, LVM and sending letter.

## 2021-06-20 NOTE — LETTER
Letter by Elizabeth Haddad at      Author: Elizabeth Haddad Service: -- Author Type: --    Filed:  Encounter Date: 9/23/2020 Status: (Other)         September 23, 2020       Sakshi Lizzieemir Parish  [address]  [place] [address]    Dear Sakshi Parish:    We are pleased to provide you with secure, online access to medical information for you and your family within [facility] Intern. Per your request, we have expanded your account to allow access to the records of the following family members:              Reji Parish (privilege ends on 4/16/2029.)     How Do I Log In?  1. In your Internet browser, go to [email or web address]  2. Log into Intern using your Intern Username and Password.  3. Click Sign In.        How Do I Access a Family Member's Account?  4. Select the account you want to access by clicking the [place] with the appropriate patient's name at the top of your screen.   5. You will see a disclaimer page letting you know that you will be viewing a family member's record. Review the disclaimer and then click Accept Proxy Access Disclaimer to proceed.  6. Once you switch to viewing a family member's record, you can navigate to Intern pages the same way you would for yourself. You can return to your own account by clicking the [place] at the top of the screen with your name on it.    7. To customize the colors and names of the linked accounts, you can select Personalize from the Profile dropdown menu at the top of the screen, then click the Edit button to make changes.     Additional Information  If you have questions, visit [email or web address], e-mail [email or web address] or call [phone] to talk to our Intern staff. Remember, Intern is NOT to be used for urgent needs. For medical emergencies, dial 911.

## 2021-09-26 ENCOUNTER — HEALTH MAINTENANCE LETTER (OUTPATIENT)
Age: 41
End: 2021-09-26

## 2021-10-22 ENCOUNTER — IMMUNIZATION (OUTPATIENT)
Dept: NURSING | Facility: CLINIC | Age: 41
End: 2021-10-22
Payer: COMMERCIAL

## 2021-10-22 PROCEDURE — 91300 PR COVID VAC PFIZER DIL RECON 30 MCG/0.3 ML IM: CPT

## 2021-10-22 PROCEDURE — 0004A PR COVID VAC PFIZER DIL RECON 30 MCG/0.3 ML IM: CPT

## 2021-11-01 ENCOUNTER — E-VISIT (OUTPATIENT)
Dept: URGENT CARE | Facility: CLINIC | Age: 41
End: 2021-11-01
Payer: COMMERCIAL

## 2021-11-01 ENCOUNTER — LAB (OUTPATIENT)
Dept: URGENT CARE | Facility: URGENT CARE | Age: 41
End: 2021-11-01
Attending: PHYSICIAN ASSISTANT
Payer: COMMERCIAL

## 2021-11-01 DIAGNOSIS — Z20.822 SUSPECTED COVID-19 VIRUS INFECTION: ICD-10-CM

## 2021-11-01 DIAGNOSIS — Z20.822 SUSPECTED COVID-19 VIRUS INFECTION: Primary | ICD-10-CM

## 2021-11-01 LAB — SARS-COV-2 RNA RESP QL NAA+PROBE: NEGATIVE

## 2021-11-01 PROCEDURE — U0003 INFECTIOUS AGENT DETECTION BY NUCLEIC ACID (DNA OR RNA); SEVERE ACUTE RESPIRATORY SYNDROME CORONAVIRUS 2 (SARS-COV-2) (CORONAVIRUS DISEASE [COVID-19]), AMPLIFIED PROBE TECHNIQUE, MAKING USE OF HIGH THROUGHPUT TECHNOLOGIES AS DESCRIBED BY CMS-2020-01-R: HCPCS

## 2021-11-01 PROCEDURE — 99421 OL DIG E/M SVC 5-10 MIN: CPT | Performed by: PHYSICIAN ASSISTANT

## 2021-11-01 PROCEDURE — U0005 INFEC AGEN DETEC AMPLI PROBE: HCPCS

## 2021-11-01 NOTE — PATIENT INSTRUCTIONS
Dear Sakshi [facility],    Your symptoms show that you may have coronavirus (COVID-19). This illness can cause fever, cough and trouble breathing. Many people get a mild case and get better on their own. Some people can get very sick.    Will I be tested for COVID-19?  We would like to test you for Covid-19 virus. I have placed orders for this test.     For all employees or close contacts (except [facility] and [employer] - see below), go to your [employer] home page and scroll down to the section that says  You have an appointment that needs to be scheduled  and click the large green button that says  Schedule Now  and follow the steps to find the next available opening.     If you are unable to complete these steps or if you cannot find any available times, please call [phone] to schedule employee testing.     [facility] employees or close contacts, please call [phone].   [facility] [employer] employees or close contacts call [phone].    Return to work/school/ guidance:  Please let your workplace manager and staffing office know when your quarantine ends     We can t give you an exact date as it depends on the above. You can calculate this on your own or work with your manager/staffing office to calculate this. (For example if you were exposed on 10/4, you would have to quarantine for 14 full days. That would be through 10/18. You could return on 10/19.)      If you receive a positive COVID-19 test result, follow the guidance of the those who are giving you the results. Usually the return to work is 10 (or in some cases 20 days from symptom onset.) If you work at [employer], you must also be cleared by Employee Occupational Health and Safety to return to work.        If you receive a negative COVID-19 test result and did not have a high risk exposure to someone with a known positive COVID-19 test, you can return to work once you're free of fever for 24 hours without fever-reducing  medication and your symptoms are improving or resolved.      If you receive a negative COVID-19 test and If you had a high risk exposure to someone who has tested positive for COVID-19 then you can return to work 14 days after your last contact with the positive individual    Note: If you have ongoing exposure to the covid positive person, this quarantine period may be more than 14 days. (For example, if you are continued to be exposed to your child who tested positive and cannot isolate from them, then the quarantine of 7-14 days can't start until your child is no longer contagious. This is typically 10 days from onset of the child's symptoms. So the total duration may be 17-24 days in this case.)    Sign up for [employer].   We know it's scary to hear that you might have COVID-19. We want to track your symptoms to make sure you're okay over the next 2 weeks. Please look for an email from [employer]--this is a free, online program that we'll use to keep in touch. To sign up, follow the link in the email you will receive. Learn more at [email or web address]    How can I take care of myself?    Get lots of rest. Drink extra fluids (unless a doctor has told you not to)    Take Tylenol (acetaminophen) or ibuprofen for fever or pain. If you have liver or kidney problems, ask your family doctor if it's okay to take Tylenol o ibuprofen    If you have other health problems (like cancer, heart failure, an organ transplant or severe kidney disease): Call your specialty clinic if you don't feel better in the next 2 days.    Know when to call 911. Emergency warning signs include:  o Trouble breathing or shortness of breath  o Pain or pressure in the chest that doesn't go away  o Feeling confused like you haven't felt before, or not being able to wake up  o Bluish-colored lips or face    Where can I get more information?  [facility] - About COVID-19:   [email or web address]    CDC - What to Do  If You're Sick:   www.cdc.gov/coronavirus/2019-ncov/about/steps-when-sick.html    November 1, 2021  RE:  Sakshi Parish                                                                                                                  [address] [place] [place] [address]      To whom it may concern:    I evaluated Sakshi Parish on November 1, 2021. Sakshi Parish should be excused from work/school.     They should let their workplace manager and staffing office know when their quarantine ends.    We can not give an exact date as it depends on the information below. They can calculate this on their own or work with their manager/staffing office to calculate this. (For example if they were exposed on 10/04, they would have to quarantine for 14 full days. That would be through 10/18. They could return on 10/19.)    Quarantine Guidelines:      If patient receives a positive COVID-19 test result, they should follow the guidance of those who are giving the results. Usually the return to work is 10 (or in some cases 20 days from symptom onset.) If they work at [employer], they must be cleared by Employee Occupational Health and Safety to return to work.        If patient receives a negative COVID-19 test result and did not have a high risk exposure to someone with a known positive COVID-19 test, they can return to work once they're free of fever for 24 hours without fever-reducing medication and their symptoms are improving or resolved.      If patient receives a negative COVID-19 test and if they had a high risk exposure to someone who has tested positive for COVID-19 then they can return to work 14 days after their last contact with the positive individual    Note: If there is ongoing exposure to the covid positive person, this quarantine period may be longer than 14 days. (For example, if they are continually exposed to their child, who tested positive and cannot isolate  from them, then the quarantine of 7-14 days can't start until their child is no longer contagious. This is typically 10 days from onset to the child's symptoms. So the total duration may be 17-24 days in this case.)     Sincerely,  Franco Maxwell PA-C

## 2022-01-13 ENCOUNTER — LAB REQUISITION (OUTPATIENT)
Dept: LAB | Facility: CLINIC | Age: 42
End: 2022-01-13

## 2022-01-13 LAB — SARS-COV-2 RNA RESP QL NAA+PROBE: POSITIVE

## 2022-01-13 PROCEDURE — U0003 INFECTIOUS AGENT DETECTION BY NUCLEIC ACID (DNA OR RNA); SEVERE ACUTE RESPIRATORY SYNDROME CORONAVIRUS 2 (SARS-COV-2) (CORONAVIRUS DISEASE [COVID-19]), AMPLIFIED PROBE TECHNIQUE, MAKING USE OF HIGH THROUGHPUT TECHNOLOGIES AS DESCRIBED BY CMS-2020-01-R: HCPCS | Performed by: INTERNAL MEDICINE

## 2022-03-13 ENCOUNTER — HEALTH MAINTENANCE LETTER (OUTPATIENT)
Age: 42
End: 2022-03-13

## 2022-05-05 ENCOUNTER — VIRTUAL VISIT (OUTPATIENT)
Dept: ONCOLOGY | Facility: CLINIC | Age: 42
End: 2022-05-05
Attending: ADVANCED PRACTICE MIDWIFE
Payer: COMMERCIAL

## 2022-05-05 VITALS — HEIGHT: 67 IN | BODY MASS INDEX: 25.11 KG/M2 | WEIGHT: 160 LBS

## 2022-05-05 DIAGNOSIS — Z80.0 FAMILY HISTORY OF COLON CANCER: ICD-10-CM

## 2022-05-05 DIAGNOSIS — Z80.51 FAMILY HISTORY OF KIDNEY CANCER: ICD-10-CM

## 2022-05-05 DIAGNOSIS — Z80.41 FAMILY HISTORY OF MALIGNANT NEOPLASM OF OVARY: Primary | ICD-10-CM

## 2022-05-05 PROCEDURE — 96040 HC GENETIC COUNSELING, EACH 30 MINUTES: CPT | Mod: GT,95 | Performed by: GENETIC COUNSELOR, MS

## 2022-05-05 NOTE — PROGRESS NOTES
"5/5/2022    Elsa is a 41 year old who is being evaluated via a billable video visit.    Patient confirms medications and allergies are accurate via patients echeck in completion, and or denies any changes since last reviewed/verified.     Raven Ferguson, Virtual Facilitator      How would you like to obtain your AVS? MyChart  If the video visit is dropped, the invitation should be resent by: Text to cell phone: [phone]  Will anyone else be joining your video visit? No    Video-Visit Details  Type of service:  Video Visit  Video Start Time: 8:49am  Video End Time:9:46am  Originating Location (pt. Location): Home  Distant Location (provider location):  [facility]   Platform used for Video Visit: iBio     Referring Provider: YUMIKO Neff CNM    Presenting Information:   Given concerns regarding the potential for COVID-19 exposure during a clinic visit, Elsa elected for a video genetic counseling visit through the Cancer Risk [facility] Program to discuss her family history of ovarian cancer. We reviewed this history, cancer screening recommendations, and available genetic testing options.    Personal History:  Sakshi is a 41 year old female. She does not have any personal history of cancer.    She had her first menstrual period at age 13, her first child at age 36, and is premenopausal. Sakshi has her ovaries, fallopian tubes and uterus in place, and she has had no ovarian cancer screening to date. She reports that she has never used hormone replacement therapy.    Sakshi has not had a mammogram or colonoscopy. She reports that she has \"a lot\" of moles, as well as cysts under her skin (approximately 8-9 lesions removed since high school); she is planning to meet with a dermatologist soon. She had a thyroid ultrasound in October 2016 that identified mild thyromegaly and a tiny cyst. She does not regularly do any other cancer screening at this time.    Family History: " "(Please see scanned pedigree for detailed family history information)    One maternal uncle was diagnosed with kidney cancer in his 50's and passed away at age 59.    One maternal uncle is 71 and was recently diagnosed with lung cancer; he does not have a history of smoking, but may have had significant radiation exposure as a radiologist.    One maternal aunt was diagnosed with ovarian cancer at age 65 and passed away at age 66. She reportedly pursued genetic testing in 2014 and \"of 23 known genetic markers for ovarian cancer, none were positive\". A copy of her test report is not available at this time.    Elsa's maternal grandmother was diagnosed with and passed away from ovarian cancer at age 59; she did not pursue genetic testing.    Elsa's paternal grandfather was diagnosed with and passed away from colon cancer at age 90.    Her maternal ethnicity is Czech, Cam[place], and Bengali. Her paternal ethnicity is [place] and [place]. There is no known Ashkenazi Zoroastrian ancestry on either side of her family.    Discussion:    Sakshi's family history of ovarian cancer is suggestive of a hereditary cancer syndrome.    We reviewed the features of sporadic, familial, and hereditary cancers. In looking at Sakshi's maternal family history, it is possible that a cancer susceptibility gene is present as Elsa has two close relatives in two generations diagnosed with ovarian cancer and one maternal relative diagnosed with a potentially related cancer (kidney). Of note, Elsa's paternal family history is currently most consistent with sporadic cancer given her grandfather's isolated cancer diagnosed at a typical age.    We discussed the natural history and genetics of hereditary ovarian cancer, including Hereditary Breast and Ovarian Cancer (HBOC) syndrome.    We reviewed that the most common cause of hereditary ovarian cancer is HBOC syndrome, which is caused by mutations in the BRCA1 and BRCA2 genes. Individuals with HBOC syndrome " are at increased risk for several different cancers, including breast, ovarian, male breast, prostate, melanoma, and pancreatic cancer.    We discussed that there are additional genes that could cause increased risk for ovarian, kidney, and related cancers. As many of these genes present with overlapping features in a family and accurate cancer risk cannot always be established based upon the pedigree analysis alone, it would be reasonable for Sakshi to consider panel genetic testing to analyze multiple genes at once.    Based on her family history, Sakshi meets current National Comprehensive Cancer Network (NCCN) criteria for genetic testing of high penetrance ovarian cancer genes (i.e. BRCA1, BRCA2, BRIP1, RAD51C, RAD51D, Goldstein syndrome genes, etc.). Of note, though her maternal aunt reportedly pursued genetic testing, no test report is available documenting the results and the genes included in the test. Also, her maternal grandmother did not pursue genetic testing and may have had a different inherited explanation for her cancer. As such, genetic testing is still indicated for Elsa.    A detailed handout regarding these genes/syndromes and the information we discussed was provided to Sakshi at the end of our appointment and can be found in the after visit summary. Topics included: inheritance pattern, cancer risks, cancer screening recommendations, and also risks, benefits and limitations of testing.    We discussed that genetic testing for cancer susceptibility genes is typically most informative, though, when it is first performed on a family member with a personal history of cancer. Testing is available to Sakshi, but with limitations. If Sakshi pursues testing at this time and receives a negative result, this does not rule out the possibility of a hereditary cancer syndrome in her and/or her family. Despite these limitations and given that her relatives with cancer have passed away, Sakshi  expressed interest in proceeding with testing.    We reviewed genetic testing options for hereditary gynecologic and related cancers: actionable breast and gynecologic cancer panel ([facility] Breast [facility] G[facility] Cancers Guidelines-Based Panel) and expanded pan-cancer panels ([facility] Common Hereditary Cancers Panel or Multi-Cancer Panel). Sakshi expressed interest in learning as much information as possible from the testing. She opted for the [facility] Multi-Cancer Panel.  The [facility] Multi-Cancer Panel analyzes 84 genes associated with increased risk for cancer: AIP, ALK, APC, CRISTIANA, AXIN2, BAP1, BARD1, BLM, BMPR1A, BRCA1, BRCA2, BRIP1, CASR,CDC73, CDH1, CDK4, CDKN1B, CDKN1C, CDKN2A, CEBPA, CHEK2, CTNNA1, DICER1,DIS3L2, EGFR, EPCAM, FH, FLCN, GATA2, GPC3, GREM1, HOXB13, HRAS, KIT, MAX,MEN1, MET, MITF, MLH1, MSH2, MSH3, MSH6, MUTYH, NBN, NF1, NF2, NTHL1,PALB2, PDGFRA, PHOX2B, PMS2, POLD1, POLE, POT1, WSUMQ0H, PTCH1, PTEN, RAD50,RAD51C, RAD51D, RB1, RECQL4, RET, RUNX1, SDHA, SDHAF2, SDHB, SDHC, SDHD,SMAD4, SMARCA4, SMARCB1, SMARCE1, STK11, SUFU, TERC, TERT, WKJU890, TP53,TSC1, TSC2, VHL, WRN, WT1.  This panel includes genes associated with hereditary cancers across multiple major organ systems, including: breast and gynecologic (breast, ovarian, uterine), gastrointestinal (colorectal, gastric, pancreatic), endocrine (thyroid, paraganglioma/pheochromocytoma, parathyroid, pituitary), genitourinary (renal/urinary tract, prostate), skin (melanoma, basal cell carcinoma), brain/nervous system, sarcoma, and hematologic (myelodysplastic syndrome/leukemia).  Individuals who are found to carry a mutation in one of these genes are at increased risk of developing certain cancers/tumors. Depending on the gene mutation found, identifying those at elevated risk may guide implementation of additional screening, surveillance, and other interventions.    Consent was obtained over the video and Elsa elected to schedule a lab appointment at her  [facility] at her earliest convenience. Once her blood is drawn, genetic testing using the Multi-Cancer Panel will be sent to [facility]. Of note, testing will begin with the BRCA1 and BRCA2 genes, with reflex to the complete panel. Turn around time: 2-3 weeks after [facility] receivers her blood sample.    Medical Management: For Sakshi, we reviewed that the information from genetic testing may determine:    additional cancer screening for which Sakshi may qualify (i.e. mammogram and breast MRI, more frequent colonoscopies, more frequent dermatologic exams, etc.),    options for risk reducing surgeries Sakshi could consider (i.e. bilateral mastectomy, surgery to remove her ovaries and/or uterus, etc.),      and targeted chemotherapies if she were to develop certain cancers in the future (i.e. immunotherapy for individuals with Goldstein syndrome, PARP inhibitors, etc.).     These recommendations and possible targeted chemotherapies will be discussed in detail once genetic testing is completed.     Plan:  1) Today Sakshi elected to proceed with genetic testing of the BRCA1 and BRCA2 genes, with reflex to the complete Multi-Cancer Panel, through [facility]. She will schedule a lab appointment at her earliest convenience.  2) The results should be available 2-3 weeks after [facility] receives her blood sample.  3) Sakshi will be contacted to schedule a virtual visit to discuss the results.    Estella Goodman MS, [facility]  Licensed, Certified Genetic Counselor  Office: [phone]  Pager: [phone]

## 2022-05-05 NOTE — LETTER
"  5/5/2022     RE: Sakshi Parish  [address] [place] [address]    Dear Colleague,    Thank you for referring your patient, Sakshi Parish, to the [facility]. Please see a copy of my visit note below.    5/5/2022    Elsa is a 41 year old who is being evaluated via a billable video visit.    Patient confirms medications and allergies are accurate via patients echeck in completion, and or denies any changes since last reviewed/verified.     Raevn Ferguson, Virtual Facilitator      How would you like to obtain your AVS? MyChart  If the video visit is dropped, the invitation should be resent by: Text to cell phone: [phone]  Will anyone else be joining your video visit? No    Video-Visit Details  Type of service:  Video Visit  Video Start Time: 8:49am  Video End Time:9:46am  Originating Location (pt. Location): Home  Distant Location (provider location):  [facility]   Platform used for Video Visit: tenfarms     Referring Provider: YUMIKO Neff CNM    Presenting Information:   Given concerns regarding the potential for COVID-19 exposure during a clinic visit, Elsa elected for a video genetic counseling visit through the Cancer Risk Management Program to discuss her family history of ovarian cancer. We reviewed this history, cancer screening recommendations, and available genetic testing options.    Personal History:  Sakshi is a 41 year old female. She does not have any personal history of cancer.    She had her first menstrual period at age 13, her first child at age 36, and is premenopausal. Sakshi has her ovaries, fallopian tubes and uterus in place, and she has had no ovarian cancer screening to date. She reports that she has never used hormone replacement therapy.    Sakshi has not had a mammogram or colonoscopy. She reports that she has \"a lot\" of moles, as well as cysts under her skin (approximately 8-9 " "lesions removed since high school); she is planning to meet with a dermatologist soon. She had a thyroid ultrasound in October 2016 that identified mild thyromegaly and a tiny cyst. She does not regularly do any other cancer screening at this time.    Family History: (Please see scanned pedigree for detailed family history information)    One maternal uncle was diagnosed with kidney cancer in his 50's and passed away at age 59.    One maternal uncle is 71 and was recently diagnosed with lung cancer; he does not have a history of smoking, but may have had significant radiation exposure as a radiologist.    One maternal aunt was diagnosed with ovarian cancer at age 65 and passed away at age 66. She reportedly pursued genetic testing in 2014 and \"of 23 known genetic markers for ovarian cancer, none were positive\". A copy of her test report is not available at this time.    Elsa's maternal grandmother was diagnosed with and passed away from ovarian cancer at age 59; she did not pursue genetic testing.    Elsa's paternal grandfather was diagnosed with and passed away from colon cancer at age 90.    Her maternal ethnicity is Indian, Croatian, and Ken[place]. Her paternal ethnicity is Indian and Croatian. There is no known Ashkenazi Latter day ancestry on either side of her family.    Discussion:    Sakshi's family history of ovarian cancer is suggestive of a hereditary cancer syndrome.    We reviewed the features of sporadic, familial, and hereditary cancers. In looking at Sakshi's maternal family history, it is possible that a cancer susceptibility gene is present as Elsa has two close relatives in two generations diagnosed with ovarian cancer and one maternal relative diagnosed with a potentially related cancer (kidney). Of note, Elsa's paternal family history is currently most consistent with sporadic cancer given her grandfather's isolated cancer diagnosed at a typical age.    We discussed the natural history and genetics of " hereditary ovarian cancer, including Hereditary Breast and Ovarian Cancer (HBOC) syndrome.    We reviewed that the most common cause of hereditary ovarian cancer is HBOC syndrome, which is caused by mutations in the BRCA1 and BRCA2 genes. Individuals with HBOC syndrome are at increased risk for several different cancers, including breast, ovarian, male breast, prostate, melanoma, and pancreatic cancer.    We discussed that there are additional genes that could cause increased risk for ovarian, kidney, and related cancers. As many of these genes present with overlapping features in a family and accurate cancer risk cannot always be established based upon the pedigree analysis alone, it would be reasonable for Sakshi to consider panel genetic testing to analyze multiple genes at once.    Based on her family history, Sakshi meets current National Comprehensive Cancer Network (NCCN) criteria for genetic testing of high penetrance ovarian cancer genes (i.e. BRCA1, BRCA2, BRIP1, RAD51C, RAD51D, Goldstein syndrome genes, etc.). Of note, though her maternal aunt reportedly pursued genetic testing, no test report is available documenting the results and the genes included in the test. Also, her maternal grandmother did not pursue genetic testing and may have had a different inherited explanation for her cancer. As such, genetic testing is still indicated for Elsa.    A detailed handout regarding these genes/syndromes and the information we discussed was provided to Sakshi at the end of our appointment and can be found in the after visit summary. Topics included: inheritance pattern, cancer risks, cancer screening recommendations, and also risks, benefits and limitations of testing.    We discussed that genetic testing for cancer susceptibility genes is typically most informative, though, when it is first performed on a family member with a personal history of cancer. Testing is available to Sakshi, but with limitations.  If Sakshi pursues testing at this time and receives a negative result, this does not rule out the possibility of a hereditary cancer syndrome in her and/or her family. Despite these limitations and given that her relatives with cancer have passed away, Sakshi expressed interest in proceeding with testing.    We reviewed genetic testing options for hereditary gynecologic and related cancers: actionable breast and gynecologic cancer panel ([facility] Cancers Guidelines-Based Panel) and expanded pan-cancer panels ([facility] Common Hereditary Cancers Panel or Multi-Cancer Panel). Sakshi expressed interest in learning as much information as possible from the testing. She opted for the [facility] Multi-Cancer Panel.  The [facility] Multi-Cancer Panel analyzes 84 genes associated with increased risk for cancer: AIP, ALK, APC, CRISTIANA, AXIN2, BAP1, BARD1, BLM, BMPR1A, BRCA1, BRCA2, BRIP1, CASR,CDC73, CDH1, CDK4, CDKN1B, CDKN1C, CDKN2A, CEBPA, CHEK2, CTNNA1, DICER1,DIS3L2, EGFR, EPCAM, FH, FLCN, GATA2, GPC3, GREM1, HOXB13, HRAS, KIT, MAX,MEN1, MET, MITF, MLH1, MSH2, MSH3, MSH6, MUTYH, NBN, NF1, NF2, NTHL1,PALB2, PDGFRA, PHOX2B, PMS2, POLD1, POLE, POT1, UDGIZ7C, PTCH1, PTEN, RAD50,RAD51C, RAD51D, RB1, RECQL4, RET, RUNX1, SDHA, SDHAF2, SDHB, SDHC, SDHD,SMAD4, SMARCA4, SMARCB1, SMARCE1, STK11, SUFU, TERC, TERT, ERHP708, TP53,TSC1, TSC2, VHL, WRN, WT1.  This panel includes genes associated with hereditary cancers across multiple major organ systems, including: breast and gynecologic (breast, ovarian, uterine), gastrointestinal (colorectal, gastric, pancreatic), endocrine (thyroid, paraganglioma/pheochromocytoma, parathyroid, pituitary), genitourinary (renal/urinary tract, prostate), skin (melanoma, basal cell carcinoma), brain/nervous system, sarcoma, and hematologic (myelodysplastic syndrome/leukemia).  Individuals who are found to carry a mutation in one of these genes are at increased risk of developing certain  cancers/tumors. Depending on the gene mutation found, identifying those at elevated risk may guide implementation of additional screening, surveillance, and other interventions.    Consent was obtained over the video and Elsa elected to schedule a lab appointment at her local [facility] at her earliest convenience. Once her blood is drawn, genetic testing using the Multi-Cancer Panel will be sent to [facility]. Of note, testing will begin with the BRCA1 and BRCA2 genes, with reflex to the complete panel. Turn around time: 2-3 weeks after [facility] receivers her blood sample.    Medical Management: For Sakshi, we reviewed that the information from genetic testing may determine:    additional cancer screening for which Sakshi may qualify (i.e. mammogram and breast MRI, more frequent colonoscopies, more frequent dermatologic exams, etc.),    options for risk reducing surgeries Sakshi could consider (i.e. bilateral mastectomy, surgery to remove her ovaries and/or uterus, etc.),      and targeted chemotherapies if she were to develop certain cancers in the future (i.e. immunotherapy for individuals with Goldstein syndrome, PARP inhibitors, etc.).     These recommendations and possible targeted chemotherapies will be discussed in detail once genetic testing is completed.     Plan:  1) Today Sakshi elected to proceed with genetic testing of the BRCA1 and BRCA2 genes, with reflex to the complete Multi-Cancer Panel, through [facility]. She will schedule a lab appointment at her earliest convenience.  2) The results should be available 2-3 weeks after [facility] receives her blood sample.  3) Sakshi will be contacted to schedule a virtual visit to discuss the results.    Estella Goodman MS, [facility]  Licensed, Certified Genetic Counselor  Office: [phone]  Pager: [phone]

## 2022-05-11 NOTE — PATIENT INSTRUCTIONS
Assessing Cancer Risk  Only about 5-10% of cancers are thought to be due to an inherited cancer susceptibility gene.    These families often have:  Several people with the same or related types of cancer  Cancers diagnosed at a young age (before age 50)  Individuals with more than one primary cancer  Multiple generations of the family affected with cancer    Some people may be candidates for genetic testing of more than one gene.  For these families, genetic testing using a cancer panel may be offered.  These panels will test different genes known to increase the risk for breast, ovarian, uterine, and/or other cancers. All of the genes discussed below have published clinical management guidelines for individuals who are found to carry a mutation. The purpose of this handout is to serve as a brief summary of the genes analyzed by the panels used to inquire about hereditary breast and gynecologic cancer:  CRISTIANA, BRCA1, BRCA2, BRIP1, CDH1, CHEK2, MLH1, MSH2, MSH6, PMS2, EPCAM, PTEN, PALB2, RAD51C, RAD51D, and TP53.  ______________________________________________________________________________  Hereditary Breast and Ovarian Cancer Syndrome   (BRCA1 and BRCA2)  A single mutation in one of the copies of BRCA1 or BRCA2 increases the risk for breast and ovarian cancer, among others.  The risk for pancreatic cancer and melanoma may also be slightly increased in some families.  The chart below shows the chance that someone with a BRCA mutation would develop cancer in his or her lifetime1,2,3,4.        A person s ethnic background is also important to consider, as individuals of Ashkenazi Buddhism ancestry have a higher chance of having a BRCA gene mutation.  There are three BRCA mutations that occur more frequently in this population.    Goldstein Syndrome   (MLH1, MSH2, MSH6, PMS2, and EPCAM)  Currently five genes are known to cause Goldstein Syndrome: MLH1, MSH2, MSH6, PMS2, and EPCAM.  A single mutation in one of the Goldstein  Syndrome genes increases the risk for colon, endometrial, ovarian, and stomach cancers.  Other cancers that occur less commonly in Ogldstein Syndrome include urinary tract, skin, and brain cancers.  The chart below shows the chance that a person with Goldstein syndrome would develop cancer in his or her lifetime5.      *Cancer risk varies depending on Goldstein syndrome gene found    Cowden Syndrome   (PTEN)  Cowden syndrome is a hereditary condition that increases the risk for breast, thyroid, endometrial, colon, and kidney cancer.  Cowden syndrome is caused by a mutation in the PTEN gene.  A single mutation in one of the copies of PTEN causes Cowden syndrome and increases cancer risk.  The chart below shows the chance that someone with a PTEN mutation would develop cancer in their lifetime6,7.  Other benign features seen in some individuals with Cowden syndrome include benign skin lesions (facial papules, keratoses, lipomas), learning disability, autism, thyroid nodules, colon polyps, and larger head size.      *One recent study found breast cancer risk to be increased to 85%    Li-Fraumeni Syndrome   (TP53)  Li-Fraumeni Syndrome (LFS) is a cancer predisposition syndrome caused by a mutation in the TP53 gene. A single mutation in one of the copies of TP53 increases the risk for multiple cancers. Individuals with LFS are at an increased risk for developing cancer at a young age. The lifetime risk for development of a LFS-associated cancer is 50% by age 30 and 90% by age 60.   Core Cancers: Sarcomas, Breast, Brain, Lung, Leukemias/Lymphomas, Adrenocortical carcinomas  Other Cancers: Gastrointestinal, Thyroid, Skin, Genitourinary    Hereditary Diffuse Gastric Cancer   (CDH1)  Currently, one gene is known to cause hereditary diffuse gastric cancer (HDGC): CDH1.  Individuals with HDGC are at increased risk for diffuse gastric cancer and lobular breast cancer. Of people diagnosed with HDGC, 30-50% have a mutation in the CDH1 gene.   This suggests there are likely other genes that may cause HDGC that have not been identified yet.      Lifetime Cancer Risks    General Population HDGC    Diffuse Gastric  <1% ~80%   Breast 12% 39-52%         Additional Genes  CRISTIANA  CRISTIANA is a moderate-risk breast cancer gene. Women who have a mutation in CRISTIANA can have between a 2-4 fold increased risk for breast cancer compared to the general population8. CRISTIANA mutations have also been associated with increased risk for pancreatic cancer, however an estimate of this cancer risk is not well understood9. Individuals who inherit two CRISTIANA mutations have a condition called ataxia-telangiectasia (AT).  This rare autosomal recessive condition affects the nervous system and immune system, and is associated with progressive cerebellar ataxia beginning in childhood.  Individuals with ataxia-telangiectasia often have a weakened immune system and have an increased risk for childhood cancers.    PALB2  Mutations in PALB2 have been shown to increase the risk of breast cancer up to 33-58% in some families; where individuals fall within this risk range is dependent upon family [id number]. PALB2 mutations have also been associated with increased risk for pancreatic cancer, although this risk has not been quantified yet.  Individuals who inherit two PALB2 mutations--one from their mother and one from their father--have a condition called Fanconi Anemia.  This rare autosomal recessive condition is associated with short stature, developmental delay, bone marrow failure, and increased risk for childhood cancers.    CHEK2   CHEK2 is a moderate-risk breast cancer gene.  Women who have a mutation in CHEK2 have around a 2-fold increased risk for breast cancer compared to the general population, and this risk may be higher depending upon family history.11,12,13 Mutations in CHEK2 have also been shown to increase the risk of a number of other cancers, including colon and prostate, however these  cancer risks are currently not well understood.    BRIP1, RAD51C and RAD51D  Mutations in BRIP1, RAD51C, and RAD51D have been shown to increase the risk of ovarian cancer and possibly female breast cancer as well14,15 .       Lifetime Cancer Risk    General Population BRIP1 RAD51C RAD51D   Ovarian 1-2% ~5-8% ~5-9% ~7-15%           Inheritance  All of the cancer syndromes reviewed above are inherited in an autosomal dominant pattern.  This means that if a parent has a mutation, each of his or her children will have a 50% chance of inheriting that same mutation.  Therefore, each child--male or female--would have a 50% chance of being at increased risk for developing cancer.      Image obtained from Genetics Home Reference, 2013     Mutations in some genes can occur de manuel, which means that a person s mutation occurred for the first time in them and was not inherited from a parent.  Now that they have the mutation, however, it can be passed on to future generations.    Genetic Testing  Genetic testing involves a blood test and will look at the genetic information in the CRISTIANA, BRCA1, BRCA2, BRIP1, CDH1, CHEK2, MLH1, MSH2, MSH6, PMS2, EPCAM, PTEN, PALB2, RAD51C, RAD51D, and TP53 genes for any harmful mutations that are associated with increased cancer risk.  If possible, it is recommended that the person(s) who has had cancer be tested before other family members.  That person will give us the most useful information about whether or not a specific gene is associated with the cancer in the family.    Results  There are three possible results of genetic testing:  Positive--a harmful mutation was identified in one or more of the genes  Negative--no mutation was identified in any of the genes on this panel  Variant of unknown significance--a variation in one of the genes was identified, but it is unclear how this impacts cancer risk in the family    Advantages and Disadvantages   There are advantages and disadvantages to  genetic testing.    Advantages  May clarify your cancer risk  Can help you make medical decisions  May explain the cancers in your family  May give useful information to your family members (if you share your results)    Disadvantages  Possible negative emotional impact of learning about inherited cancer risk  Uncertainty in interpreting a negative test result in some situations  Possible genetic discrimination concerns (see below)    Genetic Information Nondiscrimination Act (TABATHA)  TABATHA is a federal law that protects individuals from health insurance or employment discrimination based on a genetic test result alone.  Although rare, there are currently no legal discrimination protections in terms of life insurance, long term care, or disability insurances.  Visit the [employer] website to learn more.    Reducing Cancer Risk  All of the genes described above have nationally recognized cancer screening guidelines that would be recommended for individuals who test positive.  In addition to increased cancer screening, surgeries may be offered or recommended to reduce cancer risk.  Recommendations are based upon an individual s genetic test result as well as their personal and family history of cancer.    Questions to Think About Regarding Genetic Testing:  What effect will the test result have on me and my relationship with my family members if I have an inherited gene mutation?  If I don t have a gene mutation?  Should I share my test results, and how will my family react to this news, which may also affect them?  Are my children ready to learn new information that may one day affect their own health?    Hereditary Cancer Resources    FORCE: Facing Our Risk of Cancer Empowered facingourrisk.org   Bright Pink bebrightpink.org   Li-Fraumeni Syndrome Association lfsassociation.org   PTEN World PTENworld.Scicasts   No stomach for cancer, Inc. nostomachforcancer.org   Stomach cancer relief network  Scrnet.org   Collaborative Group of the Americas on Inherited Colorectal Cancer (CGA) cgaicc.com    Cancer Care cancercare.org   American Cancer Society (ACS) cancer.org   National Cancer Shelley (NCI) cancer.gov     Please call us if you have any questions or concerns.   Cancer Risk Management Program [phone] ([phone])  Tavo Pimentel, MS [facility] [phone]  Edna Juno, MS, [facility] [phone]  Cady Underwood, MS, [facility]  [phone]  Suzanna Mello, MS, [facility]  [phone]  Abby Shoaib, MS, [facility]  [phone]  Estella Goodman, MS, [facility] [phone]  Yadi Cole, MS, [facility] [phone]    References  Ian Vidal PDP, Parker S, Hussein AMIN, Evelio JE, Soraida JL, Priscilla N, Fabien H, Kings O, Padma A, Lobo B, Ria P, Manrebel S, Lauren DM, Jared N, Jacqui E, Rimma H, Rabago E, Lubinski J, Gronwald J, Selena B, Callum H, Thorlacius S, Eerola H, Petr H, Van K, Hannah OP. Average risks of breast and ovarian cancer associated with BRCA1 or BRCA2 mutations detected in case series unselected for family history: a combined analysis of 222 studies. Am J Hum Neli. 2003;72:1117-30.  Jose N, Antonella M, Perry G.  BRCA1 and BRCA2 Hereditary Breast and Ovarian Cancer. Gene Reviews online. 2013.  Buzz YC, Leilani S, Domonique G, Aviles S. Breast cancer risk among male BRCA1 and BRCA2 mutation carriers. J Natl Cancer Inst. 2007;99:1811-4.  Marty CONTRERAS, Saskia I, Brennan J, Juan J E, Blaire ER, Liz F. Risk of breast cancer in male BRCA2 carriers. J Med Neli. 2010;47:710-1.  National Comprehensive Cancer Network. Clinical practice guidelines in oncology, colorectal cancer screening. Available online (registration required). 2015.  Humberto ERICKSON, Jovany J, Julianne J, Danilo LA, Damaris MS, Eng C. Lifetime cancer risks in individuals with germline PTEN mutations. Clin Cancer Res. 2012;18:400-7.  Rosio GARCÍA. Cowden Syndrome: A Critical Review of the Clinical Literature. J Neli .  2009:18:13-27.  Lola A, Devyn D, Juanita S, Mansi P, Omar T, Siena M, Virgil B, Heather H, Sheldon R, Adolph K, Jeff L, Marty DG, Lauren D, Obinna DF, Pily MR, The Breast Cancer Susceptibility Collaboration () & Pancho ANDERSON. CRISTIANA mutations that cause ataxia-telangiectasia are breast cancer susceptibility alleles. Nature Genetics. 2006;38:873-875  Eze N , Vipin Y, Serena J, Berna L, Vinnie GM , Hayden ML, Gallinger S, Muro AG, Syngal S, Manish ML, Trini J , Susan R, Elia SZ, Daniela JR, Nida VE, Masood M, Vojosefa B, Forrest N, Tova RH, Whitney KW, and Nikki AP. CRISTIANA mutations in patients with hereditary pancreatic cancer. Cancer Discover. 2012;2:41-46  Debora JOE., et al. Breast-Cancer Risk in Families with Mutations in PALB2. NEJM. 2014; 371(6):497-506.  CHEK2 Breast Cancer Case-Control Consortium. CHEK2*1100delC and susceptibility to breast cancer: A collaborative analysis involving 10,860 breast cancer cases and 9,065 controls from 10 studies. Am J Hum Neli, 74 (2004), pp. 8969-3140  Alex T, Emanuel S, Ivan K, et al. Spectrum of Mutations in BRCA1, BRCA2, CHEK2, and TP53 in Families at High Risk of Breast Cancer. DU. 2006;295(12):1178-2797.   Blanca C, Janet D, Romina A, et al. Risk of breast cancer in women with a CHEK2 mutation with and without a family history of breast cancer. J Clin Oncol. 2011;29:7758-6319.  Joey H, David E, Lukas SJ, et al. Contribution of germline mutations in the RAD51B, RAD51C, and RAD51D genes to ovarian cancer in the population. J Clin Oncol. 2015;33(26):4076-7408. Doi:10.1200/JCO.2015.61.2408.  Nuzhat Jordanon DF, Joellen P, et al. Mutations in BRIP1 confer high risk of ovarian cancer. Judith Neli. 2011;43(11):2079-0403. doi:10.1038/ng.955.

## 2022-05-12 ENCOUNTER — LAB (OUTPATIENT)
Dept: LAB | Facility: CLINIC | Age: 42
End: 2022-05-12
Payer: COMMERCIAL

## 2022-05-12 DIAGNOSIS — Z80.41 FAMILY HISTORY OF MALIGNANT NEOPLASM OF OVARY: ICD-10-CM

## 2022-05-12 DIAGNOSIS — Z80.51 FAMILY HISTORY OF KIDNEY CANCER: ICD-10-CM

## 2022-05-12 DIAGNOSIS — Z80.0 FAMILY HISTORY OF COLON CANCER: ICD-10-CM

## 2022-05-12 PROCEDURE — 99000 SPECIMEN HANDLING OFFICE-LAB: CPT

## 2022-05-12 PROCEDURE — 36415 COLL VENOUS BLD VENIPUNCTURE: CPT

## 2022-05-23 LAB — SCANNED LAB RESULT: NORMAL

## 2022-06-29 ENCOUNTER — VIRTUAL VISIT (OUTPATIENT)
Dept: ONCOLOGY | Facility: CLINIC | Age: 42
End: 2022-06-29
Attending: GENETIC COUNSELOR, MS
Payer: COMMERCIAL

## 2022-06-29 DIAGNOSIS — Z80.0 FAMILY HISTORY OF COLON CANCER: ICD-10-CM

## 2022-06-29 DIAGNOSIS — Z80.51 FAMILY HISTORY OF KIDNEY CANCER: ICD-10-CM

## 2022-06-29 DIAGNOSIS — Z80.41 FAMILY HISTORY OF MALIGNANT NEOPLASM OF OVARY: Primary | ICD-10-CM

## 2022-06-29 PROCEDURE — 96040 HC GENETIC COUNSELING, EACH 30 MINUTES: CPT | Mod: GT,95 | Performed by: GENETIC COUNSELOR, MS

## 2022-06-29 NOTE — LETTER
6/29/2022         RE: Sakshi Parish  [address] [place] [address]        Dear Colleague,    Thank you for referring your patient, Sakshi Parish, to the [facility]. Please see a copy of my visit note below.    6/29/2022    Referring Provider: YUMIKO Neff CNM    Presenting Information:  I spoke to Sakshi by video today to discuss her genetic testing results. We last met on 5/5/2022 and her blood was drawn on 5/12/2022. The [facility] Multi-Cancer Panel was ordered from [facility]. This testing was done because of Sakshi's family history of ovarian and colon cancer.    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 This variant is called c.2708A>G ([id number]). No other variants or mutations were found in the RET gene. Given the uncertain significance of this result, medical management decisions should NOT be made based on this test result alone.    Of note, Sakshi tested negative for mutations/variants in the following genes by sequencing and deletion/duplication analysis: AIP, ALK, APC, CRISTIANA, AXIN2, BAP1, BARD1, BLM, BMPR1A, BRCA1, BRCA2, BRIP1, CASR, CDC73, CDH1, CDK4, CDKN1B, CDKN1C, CDKN2A, CEBPA, CHEK2, CTNNA1, DICER1, DIS3L2, EGFR, EPCAM (deletions/duplications only), FH, FLCN, GATA2, GPC3, GREM1 (deletions/duplications only), HOXB13, HRAS, KIT, MAX, MEN1, MET, MITF (single variant analyzed and reported), MLH1, MSH2, MSH3, MSH6, MUTYH, NBN, NF1, NF2, NTHL1, PALB2, PDGFRA, PHOX2B, PMS2, POLD1, POLE, POT1, VTKOM9G, PTCH1, PTEN, RAD50, RAD51C, RAD51D, RB1, RECQL4, RUNX1, SDHA (sequencing only), SDHAF2, SDHB, SDHC, SDHD, SMAD4, SMARCA4, SMARCB1, SMARCE1, STK11, SUFU, TERC, TERT, [id number], TP53, TSC1, TSC2, VHL, WRN, and WT1.     We reviewed the autosomal dominant inheritance of these genes.     Sakshi cannot pass on a  "mutation in any of these genes to her children based on this test result. Mutations in these genes do not skip generations.      A copy of the test report can be found in the Laboratory tab, dated 5/12/2022, and named \"LABORATORY MISCELLANEOUS ORDER\". The report is scanned in as a linked document.    Interpretation:  We discussed several different interpretations of this inconclusive test result. It is not clear if this variant in the RET gene is associated with increased cancer risk.  1. This variant may be a benign change that does not increase cancer risk.  2. This variant may be a harmful mutation that causes Multiple Endocrine Neoplasia type 2 (MEN2).    Genetic testing labs are working to collect evidence to better understand if this variant is harmful or benign, and [facility] will contact me if it is reclassified. If this variant is determined to be a benign change, there may be a different gene or combination of genes and environment that are associated with the cancers in this family that are not identifiable using this test. As such, Elsa is encouraged to contact me regularly to review any new genetic testing options that may be appropriate for her.    It is also important to consider that her relatives with cancer, such as her maternal aunt and/or grandmother with ovarian cancer, may have had a mutation in one of the genes tested and Elsa did not inherit it.    Inheritance:  We reviewed the autosomal dominant inheritance of this variant in the RET gene. We discussed that Sakshi has a 50% chance to pass this variant to each of her children. Likewise, each of her siblings has a 50% risk of having the same variant. Because it is unclear what, if any, risk is associated with this variant, clinical genetic testing for this RET variant alone is not recommended for relatives.    Screening:  Based on this inconclusive test result, it is important for Sakshi and her relatives to refer back to the family history " for appropriate cancer screening.      Due to Sakshi's family history of ovarian cancer, Sakshi and her other close maternal female relatives remain at slightly increased risk for ovarian cancer. We discussed available ovarian cancer screening (pelvic exams, CA-125 blood tests, and transvaginal ultrasounds) as well as the significant limitations of this screening. As such, this screening is not typically recommended. That being said, women in this family should discuss this screening and the signs and symptoms of ovarian cancer with their primary OB/GYN provider, as they may have individualized recommendations.    Other population cancer screening options, such as those recommended by the American Cancer Society and the National Comprehensive Cancer Network (NCCN), are also appropriate for Sakshi and her family. These screening recommendations may change if there are changes to Sakshi's personal and/or family history of cancer. Final screening recommendations should be made by each individual's primary care provider.    Additional Testing Considerations:  Although Sakshi's genetic testing result is inconclusive, other relatives may still carry a harmful gene mutation associated with hereditary cancer. Genetic counseling is recommended for Elsa's mother and maternal aunts/uncles to discuss their genetic testing options. Other maternal relatives, including Elsa's siblings, could also consider meeting with a genetic counselor. If any of these relatives do pursue genetic testing, Sakshi is encouraged to contact me so that we may review the impact of their test results on her.    Summary:  We do not have an explanation for Sakshi's family history of cancer. While no harmful genetic changes were identified, Sakshi may still be at risk for certain cancers due to family history, environmental factors, or other genetic causes not identified by this test.  Because of that, it is important that she continue  with cancer screening based on her personal and family history as discussed above.    Genetic testing is rapidly advancing, and new cancer susceptibility genes will most likely be identified in the future. Therefore, I encouraged Sakshi to contact me regularly or if there are changes in her personal or family history. This may change how we assess her cancer risk, screening, and the testing we would offer.    Plan:  1. At her request, I provided Sakshi with a copy of her test results via [facility] patient portal  2. She plans to follow-up with her medical providers, as needed.  3. She should contact me regularly, or sooner if her family history changes.  4. I will attempt to contact Sakshi if the laboratory informs me that this RET variant has been reclassified. This may change screening and testing recommendations for Sakshi and her relatives.    If Sakshi has any further questions, I encouraged her to contact me at [phone].    Estella Goodman MS, [facility]  Licensed, Certified Genetic Counselor  Office: [phone]  Pager: [phone]

## 2022-06-29 NOTE — LETTER
Cancer Risk Management  Program Locations    [facility]  [facility]  [facility]  [facility]  Mailing Address  Cancer Risk Management Program  [facility]  [address]  [place] [address]    New patient appointments  [phone]  June 30, 2022    Sakshi Lizzielucila Parish  [address] [place] [place] [address]      Dear Sakshi,    It was a pleasure speaking with you recently regarding your genetic testing results. Here is a copy of the progress note summarizing our conversation. If you have any additional questions, please feel free to call.    6/29/2022    Elsa is a 41 year old who is being evaluated via a billable video visit. Pt is in [place]      How would you like to obtain your AVS? MyChart  If the video visit is dropped, the invitation should be resent by: Send to e-mail at: [email or web address]  Will anyone else be joining your video visit? No    Video-Visit Details  Video Start Time: 7:57am  Type of service:  Video Visit  Video End Time:8:23am  Originating Location (pt. Location): Home  Distant Location (provider location):  [facility]   Platform used for Video Visit: Ledy BARR    Referring Provider: YUMIKO Neff CNM    Presenting Information:  I spoke to Sakshi by video today to discuss her genetic testing results. We last met on 5/5/2022 and her blood was drawn on 5/12/2022. The [facility] Multi-Cancer Panel was ordered from [facility]. This testing was done because of Sakshi's family history of ovarian and colon cancer.    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 This variant is called c.2708A>G (p.[id number]). No other variants or mutations were found in  "the RET gene. Given the uncertain significance of this result, medical management decisions should NOT be made based on this test result alone.    Of note, Sakshi tested negative for mutations/variants in the following genes by sequencing and deletion/duplication analysis: AIP, ALK, APC, CRISTIANA, AXIN2, BAP1, BARD1, BLM, BMPR1A, BRCA1, BRCA2, BRIP1, CASR, CDC73, CDH1, CDK4, CDKN1B, CDKN1C, CDKN2A, CEBPA, CHEK2, CTNNA1, DICER1, DIS3L2, EGFR, EPCAM (deletions/duplications only), FH, FLCN, GATA2, GPC3, GREM1 (deletions/duplications only), HOXB13, HRAS, KIT, MAX, MEN1, MET, MITF (single variant analyzed and reported), MLH1, MSH2, MSH3, MSH6, MUTYH, NBN, NF1, NF2, NTHL1, PALB2, PDGFRA, PHOX2B, PMS2, POLD1, POLE, POT1, EBPQA7K, PTCH1, PTEN, RAD50, RAD51C, RAD51D, RB1, RECQL4, RUNX1, SDHA (sequencing only), SDHAF2, SDHB, SDHC, SDHD, SMAD4, SMARCA4, SMARCB1, SMARCE1, STK11, SUFU, TERC, TERT, LLPS527, TP53, TSC1, TSC2, VHL, WRN, and WT1.     We reviewed the autosomal dominant inheritance of these genes.     Sakshi cannot pass on a mutation in any of these genes to her children based on this test result. Mutations in these genes do not skip generations.      A copy of the test report can be found in the Laboratory tab, dated 5/12/2022, and named \"LABORATORY MISCELLANEOUS ORDER\". The report is scanned in as a linked document.    Interpretation:  We discussed several different interpretations of this inconclusive test result. It is not clear if this variant in the RET gene is associated with increased cancer risk.  1. This variant may be a benign change that does not increase cancer risk.  2. This variant may be a harmful mutation that causes Multiple Endocrine Neoplasia type 2 (MEN2).    Genetic testing labs are working to collect evidence to better understand if this variant is harmful or benign, and [facility] will contact me if it is reclassified. If this variant is determined to be a benign change, there may be a different " gene or combination of genes and environment that are associated with the cancers in this family that are not identifiable using this test. As such, Elsa is encouraged to contact me regularly to review any new genetic testing options that may be appropriate for her.    It is also important to consider that her relatives with cancer, such as her maternal aunt and/or grandmother with ovarian cancer, may have had a mutation in one of the genes tested and Elsa did not inherit it.    Inheritance:  We reviewed the autosomal dominant inheritance of this variant in the RET gene. We discussed that Sakshi has a 50% chance to pass this variant to each of her children. Likewise, each of her siblings has a 50% risk of having the same variant. Because it is unclear what, if any, risk is associated with this variant, clinical genetic testing for this RET variant alone is not recommended for relatives.    Screening:  Based on this inconclusive test result, it is important for Sakshi and her relatives to refer back to the family history for appropriate cancer screening.      Due to Sakshi's family history of ovarian cancer, Sakshi and her other close maternal female relatives remain at slightly increased risk for ovarian cancer. We discussed available ovarian cancer screening (pelvic exams, CA-125 blood tests, and transvaginal ultrasounds) as well as the significant limitations of this screening. As such, this screening is not typically recommended. That being said, women in this family should discuss this screening and the signs and symptoms of ovarian cancer with their primary OB/GYN provider, as they may have individualized recommendations.    Other population cancer screening options, such as those recommended by the American Cancer Society and the National Comprehensive Cancer Network (NCCN), are also appropriate for Sakshi and her family. These screening recommendations may change if there are changes to Sakshi's  personal and/or family history of cancer. Final screening recommendations should be made by each individual's primary care provider.    Additional Testing Considerations:  Although Sakshi's genetic testing result is inconclusive, other relatives may still carry a harmful gene mutation associated with hereditary cancer. Genetic counseling is recommended for Elsa's mother and maternal aunts/uncles to discuss their genetic testing options. Other maternal relatives, including Elsa's siblings, could also consider meeting with a genetic counselor. If any of these relatives do pursue genetic testing, Sakshi is encouraged to contact me so that we may review the impact of their test results on her.    Summary:  We do not have an explanation for Sakshi's family history of cancer. While no harmful genetic changes were identified, Sakshi may still be at risk for certain cancers due to family history, environmental factors, or other genetic causes not identified by this test.  Because of that, it is important that she continue with cancer screening based on her personal and family history as discussed above.    Genetic testing is rapidly advancing, and new cancer susceptibility genes will most likely be identified in the future. Therefore, I encouraged Sakshi to contact me regularly or if there are changes in her personal or family history. This may change how we assess her cancer risk, screening, and the testing we would offer.    Plan:  1. At her request, I provided Sakshi with a copy of her test results via [facility] patient portal  2. She plans to follow-up with her medical providers, as needed.  3. She should contact me regularly, or sooner if her family history changes.  4. I will attempt to contact Sakshi if the laboratory informs me that this RET variant has been reclassified. This may change screening and testing recommendations for Sakshi and her relatives.    If Sakshi has any further questions, I  encouraged her to contact me at [phone].    Estella Goodman MS, [facility]  Licensed, Certified Genetic Counselor  Office: [phone]  Pager: [phone]

## 2022-06-29 NOTE — PROGRESS NOTES
6/29/2022    Elsa is a 41 year old who is being evaluated via a billable video visit. Pt is in [place]      How would you like to obtain your AVS? MyChart  If the video visit is dropped, the invitation should be resent by: Send to e-mail at: [email or web address]  Will anyone else be joining your video visit? No    Video-Visit Details  Video Start Time: 7:57am  Type of service:  Video Visit  Video End Time:8:23am  Originating Location (pt. Location): Home  Distant Location (provider location):  [facility]   Platform used for Video Visit: Ledy BARR    Referring Provider: YUMIKO Neff CNM    Presenting Information:  I spoke to Sakshi by video today to discuss her genetic testing results. We last met on 5/5/2022 and her blood was drawn on 5/12/2022. The [facility] Multi-Cancer Panel was ordered from [facility]. This testing was done because of Sakshi's family history of ovarian and colon cancer.    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 This variant is called c.2708A>G (p.[id number]). No other variants or mutations were found in the RET gene. Given the uncertain significance of this result, medical management decisions should NOT be made based on this test result alone.    Of note, Sakshi tested negative for mutations/variants in the following genes by sequencing and deletion/duplication analysis: AIP, ALK, APC, CRISTIANA, AXIN2, BAP1, BARD1, BLM, BMPR1A, BRCA1, BRCA2, BRIP1, CASR, CDC73, CDH1, CDK4, CDKN1B, CDKN1C, CDKN2A, CEBPA, CHEK2, CTNNA1, DICER1, DIS3L2, EGFR, EPCAM (deletions/duplications only), FH, FLCN, GATA2, GPC3, GREM1 (deletions/duplications only), HOXB13, HRAS, KIT, MAX, MEN1, MET, MITF (single variant analyzed and reported), MLH1, MSH2, MSH3, MSH6, MUTYH, NBN, NF1, NF2, NTHL1, PALB2, PDGFRA, PHOX2B, PMS2, POLD1, POLE, POT1, KBUHS2F, PTCH1,  "PTEN, RAD50, RAD51C, RAD51D, RB1, RECQL4, RUNX1, SDHA (sequencing only), SDHAF2, SDHB, SDHC, SDHD, SMAD4, SMARCA4, SMARCB1, SMARCE1, STK11, SUFU, TERC, TERT, [id number], TP53, TSC1, TSC2, VHL, WRN, and WT1.     We reviewed the autosomal dominant inheritance of these genes.     Sakshi cannot pass on a mutation in any of these genes to her children based on this test result. Mutations in these genes do not skip generations.      A copy of the test report can be found in the Laboratory tab, dated 5/12/2022, and named \"LABORATORY MISCELLANEOUS ORDER\". The report is scanned in as a linked document.    Interpretation:  We discussed several different interpretations of this inconclusive test result. It is not clear if this variant in the RET gene is associated with increased cancer risk.  1. This variant may be a benign change that does not increase cancer risk.  2. This variant may be a harmful mutation that causes Multiple Endocrine Neoplasia type 2 (MEN2).    Genetic testing labs are working to collect evidence to better understand if this variant is harmful or benign, and [facility] will contact me if it is reclassified. If this variant is determined to be a benign change, there may be a different gene or combination of genes and environment that are associated with the cancers in this family that are not identifiable using this test. As such, Elsa is encouraged to contact me regularly to review any new genetic testing options that may be appropriate for her.    It is also important to consider that her relatives with cancer, such as her maternal aunt and/or grandmother with ovarian cancer, may have had a mutation in one of the genes tested and Elsa did not inherit it.    Inheritance:  We reviewed the autosomal dominant inheritance of this variant in the RET gene. We discussed that Sakshi has a 50% chance to pass this variant to each of her children. Likewise, each of her siblings has a 50% risk of having the same " variant. Because it is unclear what, if any, risk is associated with this variant, clinical genetic testing for this RET variant alone is not recommended for relatives.    Screening:  Based on this inconclusive test result, it is important for Sakshi and her relatives to refer back to the family history for appropriate cancer screening.      Due to Sakshi's family history of ovarian cancer, Sakshi and her other close maternal female relatives remain at slightly increased risk for ovarian cancer. We discussed available ovarian cancer screening (pelvic exams, CA-125 blood tests, and transvaginal ultrasounds) as well as the significant limitations of this screening. As such, this screening is not typically recommended. That being said, women in this family should discuss this screening and the signs and symptoms of ovarian cancer with their primary OB/GYN provider, as they may have individualized recommendations.    Other population cancer screening options, such as those recommended by the American Cancer Society and the National Comprehensive Cancer Network (NCCN), are also appropriate for Sakshi and her family. These screening recommendations may change if there are changes to Sakshi's personal and/or family history of cancer. Final screening recommendations should be made by each individual's primary care provider.    Additional Testing Considerations:  Although Sakshi's genetic testing result is inconclusive, other relatives may still carry a harmful gene mutation associated with hereditary cancer. Genetic counseling is recommended for Elsa's mother and maternal aunts/uncles to discuss their genetic testing options. Other maternal relatives, including Elsa's siblings, could also consider meeting with a genetic counselor. If any of these relatives do pursue genetic testing, Sakshi is encouraged to contact me so that we may review the impact of their test results on her.    Summary:  We do not have an  explanation for Sakshi's family history of cancer. While no harmful genetic changes were identified, Sakshi may still be at risk for certain cancers due to family history, environmental factors, or other genetic causes not identified by this test.  Because of that, it is important that she continue with cancer screening based on her personal and family history as discussed above.    Genetic testing is rapidly advancing, and new cancer susceptibility genes will most likely be identified in the future. Therefore, I encouraged Sakshi to contact me regularly or if there are changes in her personal or family history. This may change how we assess her cancer risk, screening, and the testing we would offer.    Plan:  1. At her request, I provided Sakshi with a copy of her test results via [facility] patient portal  2. She plans to follow-up with her medical providers, as needed.  3. She should contact me regularly, or sooner if her family history changes.  4. I will attempt to contact Sakshi if the laboratory informs me that this RET variant has been reclassified. This may change screening and testing recommendations for Sakshi and her relatives.    If Sakshi has any further questions, I encouraged her to contact me at [phone].    Estella Goodman MS, [facility] [place]  Licensed, Certified Genetic Counselor  Office: [phone]  Pager: [phone]

## 2022-06-30 NOTE — PATIENT INSTRUCTIONS
Genetic Testing  You had a blood test that looked at the genetic information in one or more genes associated with increased cancer risk.  The testing looked for any harmful changes that would stop this particular gene from working like it should.  It is important to remember that everyone has variants in multiple genes in their bodies that do not affect how the gene works.  These changes are benign and do not cause disease or increase cancer risk.  The term often used to describe these variants is benign polymorphism.  If an individual is found to have a benign polymorphism, their genetic test result is reported as Negative.    Results  There are three possible results of any genetic test:  Positive--a harmful mutation was identified  Negative--no mutation was identified  Variant of unknown significance--a variation in one of the genes was identified, but it is unclear how this impacts cancer risk in the family    Variant of Unknown Significance (VUS)  A VUS was identified in your blood sample.  Scientists currently do not know if this variant is harmful or if it is a benign polymorphism not associated with any increased risk of cancer.   There are several reasons for this lack of knowledge, but likely your VUS has either never been seen before or has only been seen in a small number of individuals.  Until your VUS is studied in more detail and/or seen in more families, scientists are not able to predict if it is a harmful mutation or a benign polymorphism.  Therefore, the cause of the cancer in you and your relatives is still unknown.          Reclassification  Genetic testing laboratories and researchers are constantly working to determine the importance of variants of unknown significance.  Most laboratories will notify the genetic counselor when a patient s VUS has been reclassified as a harmful mutation or a benign polymorphism.      Some families may be candidates for participation in the laboratory s  variant research programs to help determine the importance of their VUS.  Only the testing laboratory can decide who is eligible for participation.  Participating in these programs does not guarantee that families will learn the significance of their VUS immediately.  It could be months or years before a VUS is reclassified.       Screening Recommendations  Cancer screening recommendations for patients with a VUS should be based on their personal and family history rather than the VUS itself.  This may include increased cancer screening for certain individuals in the family.  Your genetic counselor and health care provider can help make appropriate recommendations for you and your relatives.      It is usually not recommended that other relatives have genetic testing for the VUS unless it is done as part of the laboratory s variant research program because an individual s test results should not influence their cancer screening until we determine the importance of the VUS.  Your genetic counselor can help you and your relatives understand the risks and benefits of all genetic testing and cancer screening options.    Please call us if you have any questions or concerns.     Cancer Risk Management Program [phone] ([phone])  Tavo Pimentel, MS [facility] [phone]  Edna Fraire, MS, [facility] [phone]  Cady Underwood, MS, [facility]  [phone]  Suzanna Mello, MS, [facility]  [phone]  Abby Cramer, MS, [facility]  [phone]  Estella Goodman, MS, [facility] [phone]  Yadi Cole, MS, [facility] [phone]

## 2022-10-27 ENCOUNTER — OFFICE VISIT (OUTPATIENT)
Dept: MIDWIFE SERVICES | Facility: CLINIC | Age: 42
End: 2022-10-27
Payer: COMMERCIAL

## 2022-10-27 VITALS
DIASTOLIC BLOOD PRESSURE: 82 MMHG | HEIGHT: 67 IN | OXYGEN SATURATION: 100 % | SYSTOLIC BLOOD PRESSURE: 122 MMHG | BODY MASS INDEX: 29.51 KG/M2 | WEIGHT: 188 LBS | HEART RATE: 90 BPM

## 2022-10-27 DIAGNOSIS — Z13.29 SCREENING FOR THYROID DISORDER: ICD-10-CM

## 2022-10-27 DIAGNOSIS — Z13.1 SCREENING FOR DIABETES MELLITUS: ICD-10-CM

## 2022-10-27 DIAGNOSIS — Z01.419 WELL WOMAN EXAM WITH ROUTINE GYNECOLOGICAL EXAM: Primary | ICD-10-CM

## 2022-10-27 DIAGNOSIS — F41.9 ANXIETY: ICD-10-CM

## 2022-10-27 DIAGNOSIS — Z13.220 SCREENING FOR CHOLESTEROL LEVEL: ICD-10-CM

## 2022-10-27 LAB — HBA1C MFR BLD: 5 % (ref 0–5.6)

## 2022-10-27 PROCEDURE — 82465 ASSAY BLD/SERUM CHOLESTEROL: CPT | Performed by: ADVANCED PRACTICE MIDWIFE

## 2022-10-27 PROCEDURE — 99396 PREV VISIT EST AGE 40-64: CPT | Performed by: ADVANCED PRACTICE MIDWIFE

## 2022-10-27 PROCEDURE — 36415 COLL VENOUS BLD VENIPUNCTURE: CPT | Performed by: ADVANCED PRACTICE MIDWIFE

## 2022-10-27 PROCEDURE — 84443 ASSAY THYROID STIM HORMONE: CPT | Performed by: ADVANCED PRACTICE MIDWIFE

## 2022-10-27 PROCEDURE — 83036 HEMOGLOBIN GLYCOSYLATED A1C: CPT | Performed by: ADVANCED PRACTICE MIDWIFE

## 2022-10-27 PROCEDURE — 83718 ASSAY OF LIPOPROTEIN: CPT | Performed by: ADVANCED PRACTICE MIDWIFE

## 2022-10-27 NOTE — PATIENT INSTRUCTIONS
Andressa Cantor    I am sorry you are not feeling well.    When you're struggling with depression or anxiety, you can't just shake it off. You might have a couple of good days followed by a bad day or a string of bad days. And you don't know how long it will last. Anxiety and depression aren't like the flu or a sprained ankle where your doctor can tell you about how long it will take to get better.    Your body and mind are not as separate as most people think.  What you think affects how you feel and how you feel influences what you do and think.  This means if you do things that people who feel good do, it will help you feel better.  Sometimes this is all it takes.  There is also a place for medication and therapy depending on how severe your depression or anxiety is.     When you're getting better, many experts call it recovery. Recovery is finding your path to the life you care about.     During your recovery, be patient and kind to yourself. Remember that anxiety and/or depression is not your fault and isn't something you can overcome with willpower alone. You need treatment, just like for any other illness.     Continuing your treatment, helping yourself, getting support, and having a healthy lifestyle are all part of your recovery. Your symptoms will fade as your treatment starts to work. Don't give up. Focus your energy on getting better. Your mood will improve. It just takes some time.    Depression and Anxiety: Patient Self Care Plan      In order to feel better, I will:    ____  Exercise: I will get some form of exercise everyday.  This will help reduce pain and release endorphins. The  feel good  chemicals in your brain.  Exercise has been shown to be almost as good as taking antidepressants!  It can be as simple as just going for a walk or doing some gardening, anything that will get you moving.    _____  Hygiene: I will maintain good hygiene (Get out of bed in the morning, make your bed, brush your  teeth, take a shower, and get dressed as if you are going to work, even if you are unemployed.  If your clothes don't fit try to get ones that do.    ____  Diet: I will strive to eat foods that are good for me, drink plenty of water, and avoid excessive processed foods, sugar, caffeine, alcohol and other mood altering substances.  Some foods that can boost your mood are complex carbohydrates, B vitamins, flaxseed, fish and fish oil, fresh fruits and vegetables.      _____  Psychotherapy: I agree t participate in therapy.    _____  Medication:  I agree to take my medication as prescribed.  Missing doses can result in serious side effects.  I will not stop my medication abruptly without first discussing it with my provider.    _____  Staying Connected With Others: I will stay in touch with my friends, family, and my primary care provider/team.    _____  Use your imagination: I will try a creative activity.  We all have a creative side, it doesn't matter what it is or if you consider yourself good at it.  You could try something new, coloring, play dough, sewing, painting or making sandcastles and mud pies.  This will boost your endorphins.  Yay!!    _____  Witness Beauty: I will stop and smell the roses.  Take a look outside, notice colors, textures, sounds, watch the squirrels and birds.    _____  Be of Service to others:  I will volunteer.  There is always someone else in need.  By helping others we can  get out of ourselves .    _____  Humor: I will try to laugh and be silly.  Laughing releases those feel good endorphins.  Adjust your TV/netflicks/You tube channels to less news and crime.  Search for things like  Funny cat videos     _____  Stress Control:  I will try to control my stress with deep breathing, massage, biofeedback or meditation.  I will try to find time to relax each day.          Other Selfcare Techniques/Resources:                Gratefulness practice            Write down three good things from  today.  You can do this every day for 2 weeks, or set a goal of two or three times a week            Resources about a gratefulness practice  https://www.Recommendi.com/Start-a-Gratitude-Journal  http://www.oprGraffitiTech.com/spirit/aiatby-lfwsujlrb-sfhiihg-nlwym-du-tvolqsnlw               Mindfulness            Go to '[facility]' on the internet, then type meditation into the search box, scroll down about 4 links to:  Meditation: Take a stress reduction break where ever you are - [facility], mantra-meditation  This is a page detailing different types of meditation and mindfulness and what different types may offer you.  Towards the end of the article are ways to start practicing a simple meditation today            There are two book published by [facility] on cultivating happiness, ezequiel and living well.  I have read both and think they are great, with day by day suggestions for practice.  The books are somewhat similar, so pick the one that speaks to you  [facility] Guide to Stress-Free Living, by Rafa Greenwood  This new book draws on decades of groundbreaking research to offer readers a scientifically-proven, structured, and practical approach to reducing stress. In The [facility] Guide to Stress-Free Living, you'll learn important skills that will help you use your brain and mind to live your life to its fullest potential.     [facility] Handbook for Happiness, by Rafa Greenwood  Combining groundbreaking insights from neuroscience and psychology, and wisdom from philosophy and spirituality, this new book reveals how to reduce everyday anxiety and find greater fulfillment in life.               Meditation Apps for your phone               10 % Happier - Meditation and Sleep      This mindfulness sabino provides training for people who are skeptical of meditation, and is presented by Piotr Egan, a meditation advocate. The content is firmly based on neuroscience and omits the spiritual components that are present in many other  apps. If you are new to meditation or have never really understood the point of it, this sabino might be a good fit for you. As of 1/15/2022 7 day free trial, then billed annually at $99.00/ year               VoiceObjects      Headspace provides the user with spoken-word exercises that are designed to be used for around 10 minutes a day.  I believe there is a free 7 day trial period. It includes short meditations for people who are on the go and even SOS meditations that are useful during times of crisis. It teaches users how to incorporate meditation into their own lives so that it works for them. As of 1/15/2022 subscription price is $ 12.99 /month, or $ 69.99/yr, it looks like there is a student option for $ 9.99/month                  Aura      This mindfulness sabino provides the user with daily micro-meditations that last only three minutes apiece. Aura helps users relieve stress and anxiety with a technologically advanced, simple, and effective meditation platform. It was created by some of the best and most sought-after meditation teachers and therapists, and personalized by AI.  Aura allows users the option to keep a gratitude journal, track moods throughout the day, and listen to the sounds of nature. It even makes meditation into a game, as it allows you to level up as you learn and participate in daily challenges. This may be the right sabino for you if you have limited time throughout the day to practice your meditation.  As of 1/15/2022 they have a free option that offers one 3 minute meditation a day.  Their subscription plan is $12.99/month or $95.00/annually and describes having access to as many 7 minute guided meditations as desired.               Calm      This award-winning sabino has calming exercises, breathing techniques to help you relax, and even a Calm Kids section with meditations for kids between 3 and 17. The new Sleep Stories section features a great mix of voice talent -- you may recognize actors  Frantz Carmichael or Zechariah Guillaume -- to lull you to sleep. A recent update to the Calm sabino makes for a mindful experience through the Apple Watch, with three new breathing exercises, a mindful walking meditation, and calming exercises you can all access right from your watch. As of 1/15/2022 subscription price around $ 6.00/month

## 2022-10-27 NOTE — PROGRESS NOTES
Sakshi is a 42 year old  female who presents for annual exam.     Menses are irregular and variable lasting 4 days.  Menses flow: normal and variably.  Patient's last menstrual period was 10/09/2022.. Using Nexplanon for contraception.  She is not currently considering pregnancy.  Besides routine health maintenance,  she would like to discuss anxiety maintenance  and alterations to her menstrual cycle   GYNECOLOGIC HISTORY:  Menarche: 12  Age at first intercourse: 17  Sakshi is sexually active with 1 male partner(s) and is currently in monogamous relationship with 1 male..    History sexually transmitted infections:No STD history and Chlamydia  STI testing offered?  Declined  CORONA exposure: No  History of abnormal Pap smear: NO - age 30- 65 PAP every 3 years recommended  Family history of breast CA: No  Family history of uterine/ovarian CA: Yes (Please explain): maternal grandmother and maternal aunt both are  due ovarian CA.    Family history of colon CA: Yes (Please explain): maternal uncle    HEALTH MAINTENANCE:  Cholesterol: (  Cholesterol   Date Value Ref Range Status   2015 187 <200 mg/dL Final     Comment:     LDL Cholesterol is the primary guide to therapy.   The NCEP recommends further evaluation of: patients with cholesterol greater   than 200 mg/dL if additional risk factors are present, cholesterol greater   than   240 mg/dL, triglycerides greater than 150 mg/dL, or HDL less than 40 mg/dL.     2014 164 <200 mg/dL Final     Comment:     LDL Cholesterol is the primary guide to therapy.   The NCEP recommends further evaluation of: patients with cholesterol greater   than 200 mg/dL if additional risk factors are present, cholesterol greater   than   240 mg/dL, triglycerides greater than 150 mg/dL, or HDL less than 40 mg/dL.    History of abnormal lipids: No  Mammo: no . History of abnormal Mammo:, No.  Regular Self Breast Exams: Yes  Calcium/Vitamin D intake: source:  Dairy as  well as dietary supplement.   Adequate? Yes  TSH: (  TSH   Date Value Ref Range Status   2020 0.43 0.40 - 4.00 mU/L Final    )  Pap; (  Lab Results   Component Value Date    PAP NIL 2020    PAP NIL 2017    PAP NIL 2013    )    HISTORY:  OB History    Para Term  AB Living   2 2 2 0 0 2   SAB IAB Ectopic Multiple Live Births   0 0 0 0 2      # Outcome Date GA Lbr Santiago/2nd Weight Sex Delivery Anes PTL Lv   2 Term 10/11/20 38w0d 00:20 / 00:08 3.714 kg (8 lb 3 oz) M Vag-Spont None N ANASTASIA      Complications: Prolonged PROM (>18 hours)      Name: JOÃOMALE-WALLACE      Apgar1: 8  Apgar5: 9   1 Term 17 37w4d 18:15 / 00:45 3.232 kg (7 lb 2 oz) M Vag-Spont Nitrous N ANASTASIA      Complications: Prolonged PROM (>18 hours)      Name: DALE MOYER1 WALLACE      Apgar1: 8  Apgar5: 9     Past Medical History:   Diagnosis Date     ADD (attention deficit disorder)      Family history of malignant melanoma of skin 2/3/2017     Goiter 2015     Hx of migraines     late teens/20's     Past Surgical History:   Procedure Laterality Date     CHOLECYSTECTOMY       Family History   Problem Relation Age of Onset     Hypertension Maternal Grandfather      Cerebrovascular Disease Maternal Grandfather      Alzheimer Disease Maternal Grandfather      Hypertension Paternal Grandfather      Ovarian Cancer Maternal Grandmother              Skin Cancer Mother      Hypothyroidism Mother      Migraines Sister      Migraines Sister      Ovarian Cancer Maternal Aunt         66-     Skin Cancer Maternal Aunt      Social History     Socioeconomic History     Marital status:      Spouse name: Landon     Number of children: 0     Years of education: None     Highest education level: None   Occupational History     Occupation: IT     Employer: [employer]   Tobacco Use     Smoking status: Never     Smokeless tobacco: Never   Substance and Sexual Activity     Alcohol use: Not Currently  "    Alcohol/week: 0.0 standard drinks     Comment: occ     Drug use: No     Sexual activity: Yes     Partners: Male     Birth control/protection: None   Other Topics Concern     Parent/sibling w/ CABG, MI or angioplasty before 65F 55M? No       Current Outpatient Medications:      fish oil-omega-3 fatty acids 1000 MG capsule, Take 2 g by mouth daily, Disp: , Rfl:      Flaxseed, Linseed, (FLAXSEED OIL PO), , Disp: , Rfl:      lactobacillus rhamnosus, GG, (CULTURELL) capsule, Take 1 capsule by mouth 2 times daily, Disp: , Rfl:      Prenatal Vit-Fe Fumarate-FA (PRENATAL VITAMIN PO), , Disp: , Rfl:      VITAMIN D, CHOLECALCIFEROL, PO, Take 1,000 Units by mouth daily, Disp: , Rfl:     Current Facility-Administered Medications:      etonogestrel (NEXPLANON) subdermal implant 68 mg, 1 each, Subdermal, Once, Alma Mcwilliams APRN CNM   No Known Allergies    Past medical, surgical, social and family history were reviewed and updated in EPIC.    ROS:   C:     NEGATIVE for fever, chills, change in weight  I:       NEGATIVE for worrisome rashes, moles or lesions  E:     NEGATIVE for vision changes or irritation  E/M: NEGATIVE for ear, mouth and throat problems  R:     NEGATIVE for significant cough or SOB  CV:   NEGATIVE for chest pain, palpitations or peripheral edema  GI:     NEGATIVE for nausea, abdominal pain, heartburn, or change in bowel habits  :   NEGATIVE for frequency, dysuria, hematuria, vaginal discharge, or irregular bleeding  M:     NEGATIVE for significant arthralgias or myalgia  N:      NEGATIVE for weakness, dizziness or paresthesias  E:      NEGATIVE for temperature intolerance, skin/hair changes  P:      NEGATIVE for changes in mood or affect.    EXAM:  /82   Pulse 90   Ht 1.702 m (5' 7\")   Wt 85.3 kg (188 lb)   LMP 10/09/2022   SpO2 100%   BMI 29.44 kg/m     BMI: Body mass index is 29.44 kg/m .  Constitutional: healthy, alert and no distress  Head: Normocephalic. No masses, lesions, " tenderness or abnormalities  Neck: Neck supple. Trachea midline. No adenopathy. Thyroid symmetric, normal size.   Cardiovascular: RRR.   Respiratory: Negative.   Breast: Breasts reveal mild symmetric fibrocystic densities, but there are no dominant, discrete, fixed or suspicious masses found.  Gastrointestinal: Abdomen soft, non-tender, non-distended. No masses, organomegaly.  :  Vulva:  No external lesions, normal female hair distribution, no inguinal adenopathy.    Urethra:  Midline, non-tender, well supported, no discharge  Vagina:  Moist, pink, no abnormal discharge, no lesions  Uterus:  Normal size, anteverted  , non-tender, freely mobile  Ovaries:  No masses appreciated, non-tender, mobile  Rectal Exam: deferred  Musculoskeletal: extremities normal  Skin: no suspicious lesions or rashes  Psychiatric: Affect appropriate, cooperative,mentation appears normal.     COUNSELING:   Reviewed preventive health counseling, as reflected in patient instructions       Regular exercise       Healthy diet/nutrition       mental health care    reports that she has never smoked. She has never used smokeless tobacco.    Body mass index is 29.44 kg/m .    FRAX Risk Assessment    ASSESSMENT:  42 year old female with satisfactory annual exam  (Z01.419) Well woman exam with routine gynecological exam  (primary encounter diagnosis)  Comment:   Plan:     (Z13.1) Screening for diabetes mellitus  Comment:   Plan: Hemoglobin A1c            (Z13.29) Screening for thyroid disorder  Comment:   Plan: TSH with free T4 reflex            (Z13.220) Screening for cholesterol level  Comment:   Plan: Cholesterol, HDL cholesterol            (F41.9) Anxiety  Comment:   Plan:   Referral to [facility], given hand out on self care for depression/anxiety, discussed zoloft and vistaril, pt declines at this time.    Pt has been to the genetic counseling center in the cancer center for her fam hx of ovarian CA.  She was tested for genetic factors that may  make her more susceptible, it appears on gene had an undetermined result.  With her hx, discussed possibly recommending a pelvic and ovarian U/S in the next several years.  Pt recently had a baby and ultrasounds during that with no concerns for her ovaries.    Reviewed recommendation for colonoscopy at 45, and mammogram once she is done lactating.    Pt stated understanding.    YUMIKO Allred CNM

## 2022-10-28 LAB
CHOLEST SERPL-MCNC: 203 MG/DL
HDLC SERPL-MCNC: 57 MG/DL
TSH SERPL DL<=0.005 MIU/L-ACNC: 1.53 MU/L (ref 0.4–4)

## 2022-10-30 NOTE — RESULT ENCOUNTER NOTE
Dear Sakshi    Your lab results look normal.  If you have any questions please call our clinic at [phone], or send a my chart message.    Have a nice day.    Capri Vazquez CNM

## 2022-10-31 ENCOUNTER — TELEPHONE (OUTPATIENT)
Dept: BEHAVIORAL HEALTH | Facility: CLINIC | Age: 42
End: 2022-10-31

## 2022-10-31 NOTE — TELEPHONE ENCOUNTER
Patient outreach per CNM referral. No answer, left  with scheduling instructions and sent "[employer]t message with [facility] role information.

## 2022-12-28 ENCOUNTER — E-VISIT (OUTPATIENT)
Dept: URGENT CARE | Facility: CLINIC | Age: 42
End: 2022-12-28
Payer: COMMERCIAL

## 2022-12-28 ENCOUNTER — LAB (OUTPATIENT)
Dept: URGENT CARE | Facility: URGENT CARE | Age: 42
End: 2022-12-28
Attending: EMERGENCY MEDICINE
Payer: COMMERCIAL

## 2022-12-28 DIAGNOSIS — J02.9 PHARYNGITIS, UNSPECIFIED ETIOLOGY: ICD-10-CM

## 2022-12-28 DIAGNOSIS — Z20.822 SUSPECTED COVID-19 VIRUS INFECTION: ICD-10-CM

## 2022-12-28 DIAGNOSIS — J02.9 PHARYNGITIS, UNSPECIFIED ETIOLOGY: Primary | ICD-10-CM

## 2022-12-28 LAB
DEPRECATED S PYO AG THROAT QL EIA: NEGATIVE
FLUAV AG SPEC QL IA: NEGATIVE
FLUBV AG SPEC QL IA: NEGATIVE
GROUP A STREP BY PCR: NOT DETECTED
SARS-COV-2 RNA RESP QL NAA+PROBE: NEGATIVE

## 2022-12-28 PROCEDURE — U0003 INFECTIOUS AGENT DETECTION BY NUCLEIC ACID (DNA OR RNA); SEVERE ACUTE RESPIRATORY SYNDROME CORONAVIRUS 2 (SARS-COV-2) (CORONAVIRUS DISEASE [COVID-19]), AMPLIFIED PROBE TECHNIQUE, MAKING USE OF HIGH THROUGHPUT TECHNOLOGIES AS DESCRIBED BY CMS-2020-01-R: HCPCS

## 2022-12-28 PROCEDURE — 87651 STREP A DNA AMP PROBE: CPT

## 2022-12-28 PROCEDURE — 99421 OL DIG E/M SVC 5-10 MIN: CPT | Mod: CS | Performed by: EMERGENCY MEDICINE

## 2022-12-28 PROCEDURE — U0005 INFEC AGEN DETEC AMPLI PROBE: HCPCS

## 2022-12-28 PROCEDURE — 87804 INFLUENZA ASSAY W/OPTIC: CPT

## 2022-12-28 NOTE — PATIENT INSTRUCTIONS
Dear Elsa,    Your symptoms show that you may have COVID-19. This illness can cause fever, cough and trouble breathing. Many people get a mild case and get better on their own. Some people can get very sick.    Because you reported additional symptoms, I would like to also test you for flu, strep.    What should I do?  I have placed orders for these tests.     For all employees or close contacts (except [place] and [place] - see below), go to your [employer] home page and scroll down to the section that says  You have an appointment that needs to be scheduled  and click the large green button that says  Schedule Now  and follow the steps to find the next available openings.     If you are unable to complete these steps or if you cannot find any available times, please call [phone] to schedule employee testing.     [place] employees or close contacts, please call [phone].   [place] ([employer]) employees or close contacts call [phone].    Return to work guidance:   Please let your workplace manager and staffing office know when your isolation ends.   Note: if you tested through [facility], there is no need to report to [facility]. If you did not test through [facility], send a copy of your results to [email or web address]. [place] call [phone],  [place] call [phone].     Please visit the Employee COVID-19 Testing Information page on the COVID-19 SharePoint site. Here you will find return to work and testing guidance, high and low risk exposure definitions, and frequently asked questions.   [employer] URL: [email or web address]     How can I take care of myself?  Over the counter medications may help with your symptoms such as runny or stuffy nose, cough, chills, or fever.  Talk to your care team about your options.     Some people are at high risk of severe illness (for example, you have a weak immune system,  you re 65 years or older, or you have certain medical problems). If your risk is high and your symptoms started in the last 5 days, we strongly recommend for you to get COVID treatment as soon as possible. Paxlovid and Molnupiravir are proven safe and effective, make you feel better faster, and prevent hospitalization and death.       To schedule an appointment to discuss COVID treatment, request an appointment on [facility] (select  COVID-19 Treatment ) or call [address] [place] [address] ([phone]).    Get lots of rest. Drink extra fluids (unless a doctor has told you not to)  Take Tylenol (acetaminophen) or ibuprofen for fever or pain. If you have liver or kidney problems, ask your family doctor if it's okay to take Tylenol or ibuprofen  Take over the counter medications for your symptoms, as directed by your doctor. You may also talk to your pharmacist.    If you have other health problems (like cancer, heart failure, an organ transplant or severe kidney disease): Call your specialty clinic if you don't feel better in the next 2 days.  Know when to call 911. Emergency warning signs include:  Trouble breathing or shortness of breath  Pain or pressure in the chest that doesn't go away  Feeling confused like you haven't felt before, or not being able to wake up  Bluish-colored lips or face    Where can I get more information?  [facility] - About COVID-19: [email or web address]   CDC - What to Do If You're Sick: www.cdc.gov/coronavirus/2019-ncov/about/steps-when-sick.html  CDC -  Isolation https://www.cdc.gov/coronavirus/2019-ncov/your-health/isolation.html

## 2023-01-17 ENCOUNTER — OFFICE VISIT (OUTPATIENT)
Dept: FAMILY MEDICINE | Facility: CLINIC | Age: 43
End: 2023-01-17
Payer: COMMERCIAL

## 2023-01-17 DIAGNOSIS — L82.1 SEBORRHEIC KERATOSIS: Primary | ICD-10-CM

## 2023-01-17 DIAGNOSIS — D18.01 CHERRY ANGIOMA: ICD-10-CM

## 2023-01-17 DIAGNOSIS — D22.9 MULTIPLE BENIGN NEVI: ICD-10-CM

## 2023-01-17 DIAGNOSIS — L81.4 SOLAR LENTIGO: ICD-10-CM

## 2023-01-17 DIAGNOSIS — L50.3 DERMATOGRAPHISM: ICD-10-CM

## 2023-01-17 DIAGNOSIS — Z12.83 SKIN CANCER SCREENING: ICD-10-CM

## 2023-01-17 PROCEDURE — 99203 OFFICE O/P NEW LOW 30 MIN: CPT | Performed by: PHYSICIAN ASSISTANT

## 2023-01-17 ASSESSMENT — PAIN SCALES - GENERAL: PAINLEVEL: NO PAIN (0)

## 2023-01-17 NOTE — PROGRESS NOTES
[facility] Dermatology Note  Encounter Date: Jan 17, 2023  Office Visit     Dermatology Problem List:  1. Benign biopsies  - Cyst, LLQ breast s/p shave biopsy 7/21/2015  - Pigmented compound nevus, R posterior lateral knee s/p shave biopsy 7/21/2015    Lesion to monitor: L 3rd toe    Last FBSE: 1/17/2023  ____________________________________________    Assessment & Plan:    # Lesion to monitor, L 3rd toe  - Continue to monitor for next visit.    # Mild dermatographism  - Recommended OTC antihistamine daily.    # Cherry angiomas  # Seborrheic keratoses  Discussed the natural history and benign nature of this lesion. Reassurance provided that no additional treatment is necessary.     # Solar lentigines  # Multiple benign nevi  - No concerning lesions today  - Monitor for ABCDEs of melanoma   - Continue sun protection - recommend SPF 30 or higher with frequent application   - Return sooner if noticing changing or symptomatic lesions     Procedures Performed:   None    Follow-up: 1 year(s) in-person, or earlier for new or changing lesions    Staff and Scribe:     Scribe Disclosure:  I, Dileep Duong, am serving as a scribe to document services personally performed by Mai Pruett PA-C based on data collection and the provider's statements to me.     Provider Disclosure:   The documentation recorded by the scribe accurately reflects the services I personally performed and the decisions made by me.    All risks, benefits and alternatives were discussed with patient.  Patient is in agreement and understands the assessment and plan.  All questions were answered.  Sun Screen Education was given.   Return to Clinic annually or sooner as needed.   Mai Pruett PA-C   [facility]   ____________________________________________    CC: Skin Check (FBSC. Family hx. Of SCC and BCC)    HPI:  Ms. Sakshi Parish is a(n) 42 year old female who presents today as a  "new patient for a FBSE. Last seen in dermatology by Dr. Meeks on 2/3/2017, at which time patient received a FBSE.    Today, patient reviews her previous history of benign mole removal and continues to endorse \"weird\" looking moles. She also has been \"itchier\" lately, with some small bumps appearing after she itches her skin. She has a family history of skin cancer, although unsure if it's melanoma. She is now good about wearing sun screen, but not as much in her youth leading to some minor sun burns.     Patient is otherwise feeling well, without additional skin concerns.    Labs Reviewed:  N/A    Physical Exam:  Vitals: There were no vitals taken for this visit.  SKIN: Total skin excluding the undergarment areas was performed. The exam included the head/face, neck, both arms, chest, back, abdomen, both legs, digits and/or nails.   - 0.15 cm brown uniform macule on the L 3rd toe at the proximal nail border.  - Linear dermatographism appreciated on the R medial forearm.   - There are dome shaped bright red papules on the trunk and extremities.   - Multiple regular brown pigmented macules and papules are identified on the scalp, trunk, and extremities.   - Scattered brown macules on sun exposed areas.  - There are waxy stuck on tan to brown papules on the trunk and extremities.   - No other lesions of concern on areas examined.     Medications:  Current Outpatient Medications   Medication     fish oil-omega-3 fatty acids 1000 MG capsule     Flaxseed, Linseed, (FLAXSEED OIL PO)     lactobacillus rhamnosus, GG, (CULTURELL) capsule     Prenatal Vit-Fe Fumarate-FA (PRENATAL VITAMIN PO)     VITAMIN D, CHOLECALCIFEROL, PO     Current Facility-Administered Medications   Medication     etonogestrel (NEXPLANON) subdermal implant 68 mg      Past Medical History:   Patient Active Problem List   Diagnosis     CARDIOVASCULAR SCREENING; LDL GOAL LESS THAN 160     Goiter     Screening for cervical cancer     Family history of " malignant melanoma of skin     Family history of basal cell carcinoma     Need for Tdap vaccination     Fetal cardiac anomaly affecting pregnancy, antepartum     Nexplanon in place     Past Medical History:   Diagnosis Date     ADD (attention deficit disorder)      Family history of malignant melanoma of skin 2/3/2017     Goiter 4/27/2015     Hx of migraines     late teens/20's        CC Referred Self, MD  No address on file on close of this encounter.

## 2023-01-17 NOTE — PATIENT INSTRUCTIONS
Patient Education     Checking for Skin Cancer  You can find cancer early by checking your skin each month. There are 3 kinds of skin cancer. They are melanoma, basal cell carcinoma, and squamous cell carcinoma. Doing monthly skin checks is the best way to find new marks or skin changes. Follow the instructions below for checking your skin.   The ABCDEs of checking moles for melanoma   Check your moles or growths for signs of melanoma using ABCDE:   Asymmetry: the sides of the mole or growth don t match  Border: the edges are ragged, notched, or blurred  Color: the color within the mole or growth varies  Diameter: the mole or growth is larger than 6 mm (size of a pencil eraser)  Evolving: the size, shape, or color of the mole or growth is changing (evolving is not shown in the images below)    Checking for other types of skin cancer  Basal cell carcinoma or squamous cell carcinoma have symptoms such as:     A spot or mole that looks different from all other marks on your skin  Changes in how an area feels, such as itching, tenderness, or pain  Changes in the skin's surface, such as oozing, bleeding, or scaliness  A sore that does not heal  New swelling or redness beyond the border of a mole    Who s at risk?  Anyone can get skin cancer. But you are at greater risk if you have:   Fair skin, light-colored hair, or light-colored eyes  Many moles or abnormal moles on your skin  A history of sunburns from sunlight or tanning beds  A family history of skin cancer  A history of exposure to radiation or chemicals  A weakened immune system  If you have had skin cancer in the past, you are at risk for recurring skin cancer.   How to check your skin  Do your monthly skin checkups in front of a full-length mirror. Check all parts of your body, including your:   Head (ears, face, neck, and scalp)  Torso (front, back, and sides)  Arms (tops, undersides, upper, and lower armpits)  Hands (palms, backs, and fingers, including  under the nails)  Buttocks and genitals  Legs (front, back, and sides)  Feet (tops, soles, toes, including under the nails, and between toes)  If you have a lot of moles, take digital photos of them each month. Make sure to take photos both up close and from a distance. These can help you see if any moles change over time.   Most skin changes are not cancer. But if you see any changes in your skin, call your doctor right away. Only he or she can diagnose a problem. If you have skin cancer, seeing your doctor can be the first step toward getting the treatment that could save your life.   [employer] last reviewed this educational content on 4/1/2019 2000-2020 [employer]. [address] [place] [address], [place] [address]. All rights reserved. This information is not intended as a substitute for professional medical care. Always follow your healthcare professional's instructions.       When should I call my doctor?  If you are worsening or not improving, please, contact us or seek urgent care as noted below.     Who should I call with questions (adults)?  [facility] (adult and pediatric): [phone]  [facility] (adult): [phone]  For urgent needs outside of business hours call the [facility] at [phone] and ask for the dermatology resident on call to be paged  If this is a medical emergency and you are unable to reach an ER, Call [phone]    Who should I call with questions (pediatric)?  [facility]- Pediatric Dermatology  Dr. Olya Linton, Dr. Liu Velasquez, Dr. Shellie Batres, ELLIS Aguilera, Dr. Preethi Panda, Dr. Brenda Pearl & Dr. Pal Jovel  Non-urgent nurse triage line; [phone]- Fiorella and Jessica HORNER Care Coordinatornoel Franz (/Complex ) [phone]    If you need a prescription refill, please contact your pharmacy. Refills are approved or denied by our  Physicians during normal business hours, Monday through Fridays  Per office policy, refills will not be granted if you have not been seen within the past year (or sooner depending on your child's condition)    Scheduling Information:  Pediatric Appointment Scheduling and Call Center [phone]  Radiology Scheduling- [phone]  Sedation Unit Scheduling- [phone]  [place] Scheduling- General [phone]; Pediatric Dermatology [phone]  Main  Services: [phone]  Serbian: [phone]  Tajik: [phone]  Hmong/Polish/Belarusian: [phone]  Preadmission Nursing Department Fax Number: [phone] (Fax all pre-operative paperwork to this number)    For urgent matters arising during evenings, weekends, or holidays that cannot wait for normal business hours please call [phone] and ask for the dermatology resident on call to be paged.

## 2023-01-17 NOTE — LETTER
1/17/2023         RE: Sakshi Parish  [address] [place] [address]        Dear Colleague,    Thank you for referring your patient, Sakshi Parish, to the [facility]. Please see a copy of my visit note below.    [facility] Dermatology Note  Encounter Date: Jan 17, 2023  Office Visit     Dermatology Problem List:  1. Benign biopsies  - Cyst, LLQ breast s/p shave biopsy 7/21/2015  - Pigmented compound nevus, R posterior lateral knee s/p shave biopsy 7/21/2015    Lesion to monitor: L 3rd toe    Last FBSE: 1/17/2023  ____________________________________________    Assessment & Plan:    # Lesion to monitor, L 3rd toe  - Continue to monitor for next visit.    # Mild dermatographism  - Recommended OTC antihistamine daily.    # Cherry angiomas  # Seborrheic keratoses  Discussed the natural history and benign nature of this lesion. Reassurance provided that no additional treatment is necessary.     # Solar lentigines  # Multiple benign nevi  - No concerning lesions today  - Monitor for ABCDEs of melanoma   - Continue sun protection - recommend SPF 30 or higher with frequent application   - Return sooner if noticing changing or symptomatic lesions     Procedures Performed:   None    Follow-up: 1 year(s) in-person, or earlier for new or changing lesions    Staff and Scribe:     Scribe Disclosure:  I, Dileep Duong, am serving as a scribe to document services personally performed by Mai Pruett PA-C based on data collection and the provider's statements to me.     Provider Disclosure:   The documentation recorded by the scribe accurately reflects the services I personally performed and the decisions made by me.    All risks, benefits and alternatives were discussed with patient.  Patient is in agreement and understands the assessment and plan.  All questions were answered.  Sun Screen Education was given.   Return to Clinic  "annually or sooner as needed.   Mai Pruett PA-C   [facility]   ____________________________________________    CC: Skin Check (FBSC. Family hx. Of SCC and BCC)    HPI:  Ms. Sakshi Parish is a(n) 42 year old female who presents today as a new patient for a FBSE. Last seen in dermatology by Dr. Meeks on 2/3/2017, at which time patient received a FBSE.    Today, patient reviews her previous history of benign mole removal and continues to endorse \"weird\" looking moles. She also has been \"itchier\" lately, with some small bumps appearing after she itches her skin. She has a family history of skin cancer, although unsure if it's melanoma. She is now good about wearing sun screen, but not as much in her youth leading to some minor sun burns.     Patient is otherwise feeling well, without additional skin concerns.    Labs Reviewed:  N/A    Physical Exam:  Vitals: There were no vitals taken for this visit.  SKIN: Total skin excluding the undergarment areas was performed. The exam included the head/face, neck, both arms, chest, back, abdomen, both legs, digits and/or nails.   - 0.15 cm brown uniform macule on the L 3rd toe at the proximal nail border.  - Linear dermatographism appreciated on the R medial forearm.   - There are dome shaped bright red papules on the trunk and extremities.   - Multiple regular brown pigmented macules and papules are identified on the scalp, trunk, and extremities.   - Scattered brown macules on sun exposed areas.  - There are waxy stuck on tan to brown papules on the trunk and extremities.   - No other lesions of concern on areas examined.     Medications:  Current Outpatient Medications   Medication     fish oil-omega-3 fatty acids 1000 MG capsule     Flaxseed, Linseed, (FLAXSEED OIL PO)     lactobacillus rhamnosus, GG, (CULTURELL) capsule     Prenatal Vit-Fe Fumarate-FA (PRENATAL VITAMIN PO)     VITAMIN D, CHOLECALCIFEROL, PO     Current " Facility-Administered Medications   Medication     etonogestrel (NEXPLANON) subdermal implant 68 mg      Past Medical History:   Patient Active Problem List   Diagnosis     CARDIOVASCULAR SCREENING; LDL GOAL LESS THAN 160     Goiter     Screening for cervical cancer     Family history of malignant melanoma of skin     Family history of basal cell carcinoma     Need for Tdap vaccination     Fetal cardiac anomaly affecting pregnancy, antepartum     Nexplanon in place     Past Medical History:   Diagnosis Date     ADD (attention deficit disorder)      Family history of malignant melanoma of skin 2/3/2017     Goiter 4/27/2015     Hx of migraines     late teens/20's        CC Referred Self, MD  No address on file on close of this encounter.      Again, thank you for allowing me to participate in the care of your patient.        Sincerely,        Mai Pruett PA-C

## 2023-08-14 NOTE — PLAN OF CARE
VSS and postpartum assessments WDL. Fundus firm without massage. Scant lochia. Voiding without difficulty and tolerating regular diet. Up and ambulating with steady gait.  Independent with cares.  Bonding well with infant. Breastfeeding on cue. Pain managed with tylenol and Ibuprofen. No new concerns reported upon frequent rounding. Will continue with postpartum cares and education per plan of care.     For Medical Surgical Hx obtained at Admission, Please see Provider H&P

## 2023-10-06 ENCOUNTER — OFFICE VISIT (OUTPATIENT)
Dept: MIDWIFE SERVICES | Facility: CLINIC | Age: 43
End: 2023-10-06
Payer: COMMERCIAL

## 2023-10-06 VITALS
DIASTOLIC BLOOD PRESSURE: 79 MMHG | TEMPERATURE: 98.4 F | HEART RATE: 94 BPM | BODY MASS INDEX: 29.44 KG/M2 | WEIGHT: 188 LBS | SYSTOLIC BLOOD PRESSURE: 114 MMHG

## 2023-10-06 DIAGNOSIS — Z23 NEED FOR PROPHYLACTIC VACCINATION AND INOCULATION AGAINST INFLUENZA: ICD-10-CM

## 2023-10-06 DIAGNOSIS — F41.9 ANXIETY: ICD-10-CM

## 2023-10-06 DIAGNOSIS — Z12.31 ENCOUNTER FOR SCREENING MAMMOGRAM FOR BREAST CANCER: ICD-10-CM

## 2023-10-06 DIAGNOSIS — Z01.419 WELL WOMAN EXAM WITH ROUTINE GYNECOLOGICAL EXAM: Primary | ICD-10-CM

## 2023-10-06 PROCEDURE — 90471 IMMUNIZATION ADMIN: CPT | Performed by: ADVANCED PRACTICE MIDWIFE

## 2023-10-06 PROCEDURE — 99396 PREV VISIT EST AGE 40-64: CPT | Mod: 25 | Performed by: ADVANCED PRACTICE MIDWIFE

## 2023-10-06 PROCEDURE — 90686 IIV4 VACC NO PRSV 0.5 ML IM: CPT | Performed by: ADVANCED PRACTICE MIDWIFE

## 2023-10-06 ASSESSMENT — ANXIETY QUESTIONNAIRES
GAD7 TOTAL SCORE: 11
3. WORRYING TOO MUCH ABOUT DIFFERENT THINGS: SEVERAL DAYS
IF YOU CHECKED OFF ANY PROBLEMS ON THIS QUESTIONNAIRE, HOW DIFFICULT HAVE THESE PROBLEMS MADE IT FOR YOU TO DO YOUR WORK, TAKE CARE OF THINGS AT HOME, OR GET ALONG WITH OTHER PEOPLE: SOMEWHAT DIFFICULT
5. BEING SO RESTLESS THAT IT IS HARD TO SIT STILL: SEVERAL DAYS
GAD7 TOTAL SCORE: 11
1. FEELING NERVOUS, ANXIOUS, OR ON EDGE: MORE THAN HALF THE DAYS
2. NOT BEING ABLE TO STOP OR CONTROL WORRYING: SEVERAL DAYS
6. BECOMING EASILY ANNOYED OR IRRITABLE: MORE THAN HALF THE DAYS
7. FEELING AFRAID AS IF SOMETHING AWFUL MIGHT HAPPEN: SEVERAL DAYS

## 2023-10-06 ASSESSMENT — PATIENT HEALTH QUESTIONNAIRE - PHQ9
SUM OF ALL RESPONSES TO PHQ QUESTIONS 1-9: 9
5. POOR APPETITE OR OVEREATING: NEARLY EVERY DAY

## 2023-10-06 NOTE — Clinical Note
Elsa Fernandez is looking for some help with her anxiety and getting set up with mental health services and maybe help with her ADD.  Thank you Martita

## 2023-10-06 NOTE — PATIENT INSTRUCTIONS
Donis Hoang    It was nice to see you again.  I think it would be a good idea to connect and establish care with one of the Family Medicine providers.  You may connect well with Ximena MOTT downstairs on the 6th floor of this building.    If you don't mind [facility], Laina Moreno MD, Brenna Shepherd MD, Zoey MOTT are also all people I have worked with.    There is a podcast Ember Coronado 'From PMS to Perimenopause' which may also be helpful information.    I wish you the best.    YUMIKO Allred CNM

## 2023-10-06 NOTE — PROGRESS NOTES
Sakshi is a 43 year old  female who presents for annual exam.     Menses are irregular and light, heavy and cramping lasting 6 days, have been variable since stopping breastfeeding her second child.  Menses flow: normal and varies.  Patient's last menstrual period was 2023. Using Nexpalnon for contraception.  got vasectomy one week ago.  Discussed efficacy of nexplanon for four years and option to replace it for irregular bleeding; she wants to leave Nexplanon in until  is cleared with his vasectomy and would feel comfortable discontinuing it. Referral placed for MH services, pt desires referral for treatment of ADHD and anxiety. Instructions given for scheduling mammogram with EP clinic. Has been battling seasonal illnesses within the household for the past month, feeling well today. Referred to family practice provider for general care.   Besides routine health maintenance, Pap, No STD/Discuss Cholesterol, No Covid Booster, Is Not Fasting today, Influenza Okay.  GYNECOLOGIC HISTORY:  Menarche: 12  Age at first intercourse: 17 Number of lifetime partners: 9  Sakshi is sexually active with  only male partner(s) and is currently in monogamous relationship with .    History sexually transmitted infections:No STD history  STI testing offered?  Declined  CORONA exposure: No  History of abnormal Pap smear: YES - updated in Problem List and Health Maintenance accordingly  Family history of breast CA: No  Family history of uterine/ovarian CA: Maternal Grandmother and Aunt    Family history of colon CA: Paternal Gandmother    HEALTH MAINTENANCE:  Cholesterol: (  Cholesterol   Date Value Ref Range Status   10/27/2022 203 (H) <200 mg/dL Final     Comment:     Age 0-19 years  Desirable: <170 mg/dL  Borderline high:  170-199 mg/dl  High:            >199 mg/dl    Age 20 years and older  Desirable: <200 mg/dL   2015 187 <200 mg/dL Final     Comment:     LDL Cholesterol is the  primary guide to therapy.   The NCEP recommends further evaluation of: patients with cholesterol greater   than 200 mg/dL if additional risk factors are present, cholesterol greater   than   240 mg/dL, triglycerides greater than 150 mg/dL, or HDL less than 40 mg/dL.     2014 164 <200 mg/dL Final     Comment:     LDL Cholesterol is the primary guide to therapy.   The NCEP recommends further evaluation of: patients with cholesterol greater   than 200 mg/dL if additional risk factors are present, cholesterol greater   than   240 mg/dL, triglycerides greater than 150 mg/dL, or HDL less than 40 mg/dL.    History of abnormal lipids: Yes  Mammo: Needs Referral . History of abnormal Mammo: YES, No.  Regular Self Breast Exams: Yes  Calcium/Vitamin D intake: source:  dairy, dietary supplement(s) Adequate? Yes  TSH: (  TSH   Date Value Ref Range Status   10/27/2022 1.53 0.40 - 4.00 mU/L Final   2020 0.43 0.40 - 4.00 mU/L Final    )  Pap; (  Lab Results   Component Value Date    PAP NIL 2020    PAP NIL 2017    PAP NIL 2013    )    HISTORY:  OB History    Para Term  AB Living   2 2 2 0 0 2   SAB IAB Ectopic Multiple Live Births   0 0 0 0 2      # Outcome Date GA Lbr Santiago/2nd Weight Sex Delivery Anes PTL Lv   2 Term 10/11/20 38w0d 00:20 / 00:08 3.714 kg (8 lb 3 oz) M Vag-Spont None N ANASTASIA      Complications: Prolonged PROM (>18 hours)      Name: JOÃOMALE-WALLACE      Apgar1: 8  Apgar5: 9   1 Term 17 37w4d 18:15 / 00:45 3.232 kg (7 lb 2 oz) M Vag-Spont Nitrous N ANASTASIA      Complications: Prolonged PROM (>18 hours)      Name: JOÃOBABY1 WALLACE      Apgar1: 8  Apgar5: 9     Past Medical History:   Diagnosis Date    ADD (attention deficit disorder)     Family history of malignant melanoma of skin 2/3/2017    Goiter 2015    Hx of migraines     late teens/20's     Past Surgical History:   Procedure Laterality Date    CHOLECYSTECTOMY       Family History   Problem  Relation Age of Onset    Hypertension Maternal Grandfather     Cerebrovascular Disease Maternal Grandfather     Alzheimer Disease Maternal Grandfather     Hypertension Paternal Grandfather     Ovarian Cancer Maternal Grandmother             Skin Cancer Mother     Hypothyroidism Mother     Migraines Sister     Migraines Sister     Ovarian Cancer Maternal Aunt         66-    Skin Cancer Maternal Aunt      Social History     Socioeconomic History    Marital status:      Spouse name: Landon    Number of children: 0    Years of education: None    Highest education level: None   Occupational History    Occupation: IT     Employer: [employer]   Tobacco Use    Smoking status: Never    Smokeless tobacco: Never   Substance and Sexual Activity    Alcohol use: Not Currently     Alcohol/week: 0.0 standard drinks of alcohol     Comment: occ    Drug use: No    Sexual activity: Yes     Partners: Male     Birth control/protection: None   Other Topics Concern    Parent/sibling w/ CABG, MI or angioplasty before 65F 55M? No     Social Determinants of Health     Financial Resource Strain: Low Risk  (2020)    Overall Financial Resource Strain (CARDIA)     Difficulty of Paying Living Expenses: Not hard at all   Food Insecurity: No Food Insecurity (2020)    Hunger Vital Sign     Worried About Running Out of Food in the Last Year: Never true     Ran Out of Food in the Last Year: Never true   Transportation Needs: No Transportation Needs (2020)    PRAPARE - Transportation     Lack of Transportation (Medical): No     Lack of Transportation (Non-Medical): No   Stress: No Stress Concern Present (3/19/2020)    [employer] - Occupational Stress Questionnaire     Feeling of Stress : Not at all   Social Connections: Unknown (3/19/2020)    Social Connection and Isolation Panel [NHANES]     Marital Status:    Interpersonal Safety: Not At Risk (3/19/2020)    Humiliation, Afraid,  Rape, and Kick questionnaire     Fear of Current or Ex-Partner: No     Emotionally Abused: No     Physically Abused: No     Sexually Abused: No       Current Outpatient Medications:     fish oil-omega-3 fatty acids 1000 MG capsule, Take 2 g by mouth daily, Disp: , Rfl:     Flaxseed, Linseed, (FLAXSEED OIL PO), , Disp: , Rfl:     lactobacillus rhamnosus, GG, (CULTURELL) capsule, Take 1 capsule by mouth 2 times daily, Disp: , Rfl:     Multiple Vitamins-Minerals (WOMENS MULTI VITAMIN & MINERAL PO), , Disp: , Rfl:     VITAMIN D, CHOLECALCIFEROL, PO, Take 1,000 Units by mouth daily, Disp: , Rfl:     Prenatal Vit-Fe Fumarate-FA (PRENATAL VITAMIN PO), , Disp: , Rfl:     Current Facility-Administered Medications:     etonogestrel (NEXPLANON) subdermal implant 68 mg, 1 each, Subdermal, Once, Alma Mcwilliams APRN CNM   No Known Allergies    Past medical, surgical, social and family history were reviewed and updated in EPIC.    ROS:   C:     NEGATIVE for fever, chills. Has gained weight since stopping BF  I:       NEGATIVE for worrisome rashes, moles or lesions.  E:     NEGATIVE for vision changes, persistent or debilitating headaches or irritation  R:     NEGATIVE for significant cough or SOB  CV:   NEGATIVE for chest pain, palpitations or peripheral edema  GI:     NEGATIVE for nausea, abdominal pain, or change in bowel habits  :   NEGATIVE for frequency, dysuria, hematuria. Irregular, variable vaginal bleeding  M:     NEGATIVE for significant arthralgias or myalgia  N:      NEGATIVE for weakness, dizziness or paresthesias  E:      NEGATIVE for temperature intolerance, skin/hair changes  P:      NEGATIVE for changes in mood or affect. Underlying anxiety.    EXAM:  /79   Pulse 94   Temp 98.4  F (36.9  C) (Oral)   Wt 85.3 kg (188 lb)   LMP 09/25/2023   Breastfeeding No   BMI 29.44 kg/m     BMI: Body mass index is 29.44 kg/m .  Constitutional: healthy, alert and no distress  Head: Normocephalic. No  masses, lesions, tenderness or abnormalities  Neck: Neck supple. Trachea midline. No adenopathy. Thyroid symmetric, normal size.   Cardiovascular: RRR.   Respiratory: Clear, equal breath sounds bilaterally, negative for adventitious sounds.  Breast: No nodularity, asymmetry or nipple discharge bilaterally.  Gastrointestinal: Abdomen soft, non-tender, non-distended. No masses, organomegaly.  Musculoskeletal: extremities normal  Skin: no suspicious lesions or rashes  Psychiatric: Tearful, cooperative, mentation appears normal.     COUNSELING:   Reviewed preventive health counseling, as reflected in patient instructions   reports that she has never smoked. She has never used smokeless tobacco.  Body mass index is 29.44 kg/m .  Weight management plan: Discussed healthy diet and exercise guidelines      ASSESSMENT:  43 year old   female with satisfactory annual exam. Expresses S/S of anxiety, ADHD, wants to proceed with referral. Wishes to keep nexplanon implant in until  cleared with vasectomy, despite her irregular cycle and bleeding. Plans to schedule mammogram.    LIZ Tristan    Referral to family medicine and [facility] for folllow up of anxiety and ADHD.  Pt declines starting any medication today.    I was present with the CNM student who participated in the service and in the documentation of the services provided.  I have verified the history and personally performed the physical exam and medical decision making, as documented by the student and edited by me.  YUMIKO Allred [facility]

## 2023-10-10 ENCOUNTER — MYC MEDICAL ADVICE (OUTPATIENT)
Dept: MIDWIFE SERVICES | Facility: CLINIC | Age: 43
End: 2023-10-10
Payer: COMMERCIAL

## 2023-10-11 ENCOUNTER — TELEPHONE (OUTPATIENT)
Dept: BEHAVIORAL HEALTH | Facility: CLINIC | Age: 43
End: 2023-10-11
Payer: COMMERCIAL

## 2023-10-23 ENCOUNTER — ALLIED HEALTH/NURSE VISIT (OUTPATIENT)
Dept: FAMILY MEDICINE | Facility: CLINIC | Age: 43
End: 2023-10-23
Payer: COMMERCIAL

## 2023-10-23 DIAGNOSIS — Z23 HIGH PRIORITY FOR 2019-NCOV VACCINE: Primary | ICD-10-CM

## 2023-10-23 PROCEDURE — 90480 ADMN SARSCOV2 VAC 1/ONLY CMP: CPT

## 2023-10-23 PROCEDURE — 91320 SARSCV2 VAC 30MCG TRS-SUC IM: CPT

## 2023-10-26 ASSESSMENT — ENCOUNTER SYMPTOMS
HEMATURIA: 0
PARESTHESIAS: 0
EYE PAIN: 0
PALPITATIONS: 0
HEMATOCHEZIA: 0
COUGH: 0
CONSTIPATION: 0
HEADACHES: 0
CHILLS: 0
NERVOUS/ANXIOUS: 1
JOINT SWELLING: 0
ARTHRALGIAS: 0
SHORTNESS OF BREATH: 0
NAUSEA: 0
WEAKNESS: 0
DIZZINESS: 0
BREAST MASS: 0
DYSURIA: 0
DIARRHEA: 0
HEARTBURN: 0
FEVER: 0
ABDOMINAL PAIN: 0
MYALGIAS: 0
FREQUENCY: 0
SORE THROAT: 0

## 2023-10-27 ENCOUNTER — OFFICE VISIT (OUTPATIENT)
Dept: FAMILY MEDICINE | Facility: CLINIC | Age: 43
End: 2023-10-27
Payer: COMMERCIAL

## 2023-10-27 VITALS
SYSTOLIC BLOOD PRESSURE: 116 MMHG | DIASTOLIC BLOOD PRESSURE: 76 MMHG | WEIGHT: 183.4 LBS | HEART RATE: 84 BPM | TEMPERATURE: 97.4 F | OXYGEN SATURATION: 97 % | BODY MASS INDEX: 28.79 KG/M2 | RESPIRATION RATE: 16 BRPM | HEIGHT: 67 IN

## 2023-10-27 DIAGNOSIS — F41.9 ANXIETY: ICD-10-CM

## 2023-10-27 DIAGNOSIS — Z00.00 ROUTINE GENERAL MEDICAL EXAMINATION AT A HEALTH CARE FACILITY: Primary | ICD-10-CM

## 2023-10-27 DIAGNOSIS — Z13.220 LIPID SCREENING: ICD-10-CM

## 2023-10-27 DIAGNOSIS — Z13.29 SCREENING FOR THYROID DISORDER: ICD-10-CM

## 2023-10-27 DIAGNOSIS — Z83.49 FAMILY HISTORY OF THYROID DISEASE IN MOTHER: ICD-10-CM

## 2023-10-27 LAB
ALBUMIN SERPL BCG-MCNC: 4.2 G/DL (ref 3.5–5.2)
ALP SERPL-CCNC: 61 U/L (ref 35–104)
ALT SERPL W P-5'-P-CCNC: 26 U/L (ref 0–50)
ANION GAP SERPL CALCULATED.3IONS-SCNC: 13 MMOL/L (ref 7–15)
AST SERPL W P-5'-P-CCNC: 28 U/L (ref 0–45)
BILIRUB SERPL-MCNC: 0.4 MG/DL
BUN SERPL-MCNC: 14.3 MG/DL (ref 6–20)
CALCIUM SERPL-MCNC: 9.5 MG/DL (ref 8.6–10)
CHLORIDE SERPL-SCNC: 103 MMOL/L (ref 98–107)
CHOLEST SERPL-MCNC: 214 MG/DL
CREAT SERPL-MCNC: 0.9 MG/DL (ref 0.51–0.95)
DEPRECATED HCO3 PLAS-SCNC: 22 MMOL/L (ref 22–29)
EGFRCR SERPLBLD CKD-EPI 2021: 81 ML/MIN/1.73M2
ERYTHROCYTE [DISTWIDTH] IN BLOOD BY AUTOMATED COUNT: 12.7 % (ref 10–15)
GLUCOSE SERPL-MCNC: 84 MG/DL (ref 70–99)
HCT VFR BLD AUTO: 42.8 % (ref 35–47)
HDLC SERPL-MCNC: 63 MG/DL
HGB BLD-MCNC: 14 G/DL (ref 11.7–15.7)
LDLC SERPL CALC-MCNC: 139 MG/DL
MCH RBC QN AUTO: 30.3 PG (ref 26.5–33)
MCHC RBC AUTO-ENTMCNC: 32.7 G/DL (ref 31.5–36.5)
MCV RBC AUTO: 93 FL (ref 78–100)
NONHDLC SERPL-MCNC: 151 MG/DL
PLATELET # BLD AUTO: 316 10E3/UL (ref 150–450)
POTASSIUM SERPL-SCNC: 3.9 MMOL/L (ref 3.4–5.3)
PROT SERPL-MCNC: 7.7 G/DL (ref 6.4–8.3)
RBC # BLD AUTO: 4.62 10E6/UL (ref 3.8–5.2)
SODIUM SERPL-SCNC: 138 MMOL/L (ref 135–145)
TRIGL SERPL-MCNC: 61 MG/DL
TSH SERPL DL<=0.005 MIU/L-ACNC: 1.67 UIU/ML (ref 0.3–4.2)
WBC # BLD AUTO: 5.7 10E3/UL (ref 4–11)

## 2023-10-27 PROCEDURE — 36415 COLL VENOUS BLD VENIPUNCTURE: CPT | Performed by: PHYSICIAN ASSISTANT

## 2023-10-27 PROCEDURE — 80053 COMPREHEN METABOLIC PANEL: CPT | Performed by: PHYSICIAN ASSISTANT

## 2023-10-27 PROCEDURE — 84443 ASSAY THYROID STIM HORMONE: CPT | Performed by: PHYSICIAN ASSISTANT

## 2023-10-27 PROCEDURE — 85027 COMPLETE CBC AUTOMATED: CPT | Performed by: PHYSICIAN ASSISTANT

## 2023-10-27 PROCEDURE — 99213 OFFICE O/P EST LOW 20 MIN: CPT | Mod: 25 | Performed by: PHYSICIAN ASSISTANT

## 2023-10-27 PROCEDURE — 80061 LIPID PANEL: CPT | Performed by: PHYSICIAN ASSISTANT

## 2023-10-27 PROCEDURE — 99386 PREV VISIT NEW AGE 40-64: CPT | Performed by: PHYSICIAN ASSISTANT

## 2023-10-27 RX ORDER — HYDROXYZINE HYDROCHLORIDE 10 MG/1
10-20 TABLET, FILM COATED ORAL EVERY 6 HOURS PRN
Qty: 40 TABLET | Refills: 1 | Status: SHIPPED | OUTPATIENT
Start: 2023-10-27 | End: 2024-03-05

## 2023-10-27 RX ORDER — CETIRIZINE HYDROCHLORIDE 10 MG/1
10 TABLET ORAL DAILY
COMMUNITY

## 2023-10-27 ASSESSMENT — ENCOUNTER SYMPTOMS
DIZZINESS: 0
DIARRHEA: 0
FREQUENCY: 0
CHILLS: 0
EYE PAIN: 0
DYSURIA: 0
WEAKNESS: 0
HEADACHES: 0
HEARTBURN: 0
NERVOUS/ANXIOUS: 1
SORE THROAT: 0
JOINT SWELLING: 0
CONSTIPATION: 0
BREAST MASS: 0
MYALGIAS: 0
COUGH: 0
NAUSEA: 0
PALPITATIONS: 0
HEMATURIA: 0
FEVER: 0
HEMATOCHEZIA: 0
ABDOMINAL PAIN: 0
SHORTNESS OF BREATH: 0
PARESTHESIAS: 0
ARTHRALGIAS: 0

## 2023-10-27 ASSESSMENT — PAIN SCALES - GENERAL: PAINLEVEL: NO PAIN (0)

## 2023-10-27 NOTE — RESULT ENCOUNTER NOTE
Elsa,    I have reviewed your lab results below:    - electrolytes, blood sugar, kidney and liver function normal   - thyroid function is normal  - blood counts are normal - normal hemoglobin/red blood cells (no anemia), normal white blood cells (infection fighting cells), normal platelets (affect ability to clot normally)    - total cholesterol is slightly elevated, triglycerides are normal, HDL (good) cholesterol is normal, LDL (bad) cholesterol is a bit elevated (normal is under 130). You do not need a cholesterol medication at this point, but I would advise working on a heart healthy diet (see below) and getting regular exercise. We can recheck this yearly.     Eat less fat (especially butter, Crisco and other saturated fats)  Buy lean cuts of meat; reduce your portions of red meat or substitute poultry or fish  Use skim milk and low-fat dairy products  Eat no more than 4 egg yolks per week  Avoid fried or fast foods that are high in fat  Eat more fruits and vegetables  Get regular exercise (at least 150 minutes per week)     Please let me know if you have any further questions.    Take care,  Rand Oleary PA-C   10/27/2023   3:15 PM

## 2023-10-27 NOTE — PATIENT INSTRUCTIONS
Hydroxyzine 10-20 mg every 6-8 hours if needed for anxiety. Can also help with itching. Caution sedation    Schedule with therapy    Schedule eye and dental appointments    Let me know if you'd like to consider daily anxiety medication         Preventive Health Recommendations  Female Ages 40 to 49    Yearly exam:   See your health care provider every year in order to  Review health changes.   Discuss preventive care.    Review your medicines if your doctor prescribed any.    Get a Pap test every three years (unless you have an abnormal result and your provider advises testing more often).    If you get Pap tests with HPV test, you only need to test every 5 years, unless you have an abnormal result. You do not need a Pap test if your uterus was removed (hysterectomy) and you have not had cancer.    You should be tested each year for STDs (sexually transmitted diseases), if you're at risk.   Ask your doctor if you should have a mammogram.    Have a colonoscopy (test for colon cancer) beginning at age 45.  Ask your provider about other options like a yearly FIT test or Cologuard test every 3 years (stool tests)      Have a cholesterol test every 5 years.     Have a diabetes test (fasting glucose) after age 45. If you are at risk for diabetes, you should have this test every 3 years.    Shots: Get a flu shot each year. Get a tetanus shot every 10 years.     Nutrition:   Eat at least 5 servings of fruits and vegetables each day.  Eat whole-grain bread, whole-wheat pasta and brown rice instead of white grains and rice.  Get adequate Calcium and Vitamin D.      Lifestyle  Exercise at least 150 minutes a week (an average of 30 minutes a day, 5 days a week). This will help you control your weight and prevent disease.  Limit alcohol to one drink per day.  No smoking.   Wear sunscreen to prevent skin cancer.  See your dentist every six months for an exam and cleaning.

## 2023-10-27 NOTE — PROGRESS NOTES
SUBJECTIVE:   CC: Elsa is an 43 year old who presents for preventive health visit.       10/27/2023     8:19 AM   Additional Questions   Roomed by Sary VIRGEN       Healthy Habits:     Getting at least 3 servings of Calcium per day:  Yes    Bi-annual eye exam:  NO    Dental care twice a year:  NO    Sleep apnea or symptoms of sleep apnea:  Excessive snoring    Diet:  Regular (no restrictions)    Frequency of exercise:  2-3 days/week    Duration of exercise:  Less than 15 minutes    Taking medications regularly:  Yes    Medication side effects:  Not applicable    Additional concerns today:  No    - mammogram - scheduled for 12/12/23  - pap negative cotesting 4/2020, next due 4/2025    Family history of ovarian cancer in maternal grandmother and maternal aunt. Did genetic evaluation - no markers found.     Anxiety issues lately   Referred to therapy   History of ADHD, terrible experience with these medications and the providers prescribing them - hesitant to take meds because of this  Anxiety is generalized     Today's PHQ-2 Score:       10/26/2023     9:01 PM   PHQ-2 ( 1999 [employer])   Q1: Little interest or pleasure in doing things 1   Q2: Feeling down, depressed or hopeless 1   PHQ-2 Score 2   Q1: Little interest or pleasure in doing things Several days   Q2: Feeling down, depressed or hopeless Several days   PHQ-2 Score 2       Social History     Tobacco Use    Smoking status: Never    Smokeless tobacco: Never   Substance Use Topics    Alcohol use: Yes     Comment: occ         10/26/2023     9:01 PM   Alcohol Use   Prescreen: >3 drinks/day or >7 drinks/week? No     Reviewed orders with patient.  Reviewed health maintenance and updated orders accordingly - Yes      Breast Cancer Screening:    FHS-7:        No data to display                Mammogram Screening - Offered annual screening and updated Health Maintenance for mutual plan based on risk factor consideration  Pertinent mammograms are reviewed under the  imaging tab.    History of abnormal Pap smear: NO - age 30-65 PAP every 5 years with negative HPV co-testing recommended      Latest Ref Rng & Units 4/13/2020    12:45 PM 4/13/2020    12:43 PM 6/2/2017     2:19 PM   PAP / HPV   PAP (Historical)  NIL      HPV 16 DNA NEG^Negative  Negative  Negative    HPV 18 DNA NEG^Negative  Negative  Negative    Other HR HPV NEG^Negative  Negative  Negative      Reviewed and updated as needed this visit by clinical staff   Tobacco  Allergies  Meds  Problems  Med Hx  Surg Hx  Fam Hx          Reviewed and updated as needed this visit by Provider   Tobacco  Allergies  Meds  Problems  Med Hx  Surg Hx  Fam Hx         Past Medical History:   Diagnosis Date    ADD (attention deficit disorder)     Family history of malignant melanoma of skin 02/03/2017    Goiter 04/27/2015    Hx of migraines     late teens/20's      Past Surgical History:   Procedure Laterality Date    CHOLECYSTECTOMY  2011       Review of Systems   Constitutional:  Negative for chills and fever.   HENT:  Negative for congestion, ear pain, hearing loss and sore throat.    Eyes:  Negative for pain and visual disturbance.   Respiratory:  Negative for cough and shortness of breath.    Cardiovascular:  Negative for chest pain, palpitations and peripheral edema.   Gastrointestinal:  Negative for abdominal pain, constipation, diarrhea, heartburn, hematochezia and nausea.   Breasts:  Negative for tenderness, breast mass and discharge.   Genitourinary:  Positive for vaginal bleeding. Negative for dysuria, frequency, genital sores, hematuria, pelvic pain, urgency and vaginal discharge.   Musculoskeletal:  Negative for arthralgias, joint swelling and myalgias.   Skin:  Negative for rash.   Neurological:  Negative for dizziness, weakness, headaches and paresthesias.   Psychiatric/Behavioral:  Negative for mood changes. The patient is nervous/anxious.         OBJECTIVE:   /76   Pulse 84   Temp 97.4  F (36.3  C)  "(Temporal)   Resp 16   Ht 1.69 m (5' 6.54\")   Wt 83.2 kg (183 lb 6.4 oz)   LMP 09/25/2023 (Exact Date)   SpO2 97%   BMI 29.13 kg/m    Physical Exam  GENERAL: healthy, alert and no distress  EYES: Eyes grossly normal to inspection, PERRL and conjunctivae and sclerae normal  HENT: ear canals and TM's normal, nose and mouth without ulcers or lesions  NECK: no adenopathy, no asymmetry, masses, or scars and thyroid normal to palpation  RESP: lungs clear to auscultation - no rales, rhonchi or wheezes  CV: regular rate and rhythm, normal S1 S2, no S3 or S4, no murmur, click or rub, no peripheral edema and peripheral pulses strong  ABDOMEN: soft, nontender, no hepatosplenomegaly, no masses and bowel sounds normal  MS: no gross musculoskeletal defects noted, no edema  SKIN: no suspicious lesions or rashes  NEURO: Normal strength and tone, mentation intact and speech normal  PSYCH: mentation appears normal, occasional tearful     Diagnostic Test Results:  See orders, pending     ASSESSMENT/PLAN:   Routine general medical examination at a health care facility  - Lipid panel reflex to direct LDL Fasting  - Comprehensive metabolic panel (BMP + Alb, Alk Phos, ALT, AST, Total. Bili, TP)  - CBC with platelets  - TSH with free T4 reflex    Screening for thyroid disorder  Family history of thyroid disease in mother  - TSH with free T4 reflex    Lipid screening  - Lipid panel reflex to direct LDL Fasting    Anxiety  Has referral for therapy which she plans to schedule. Declines daily meds for now - discussed options. She is open to trying hydroxyzine prn and will let me know if she changes her mind on daily med (likely would start with prozac)  - trial hydrOXYzine (ATARAX) 10 MG tablet  Dispense: 40 tablet; Refill: 1       COUNSELING:  Reviewed preventive health counseling, as reflected in patient instructions      She reports that she has never smoked. She has never used smokeless tobacco.      [facility] " [facility] [place]

## 2023-12-12 ENCOUNTER — ANCILLARY PROCEDURE (OUTPATIENT)
Dept: MAMMOGRAPHY | Facility: CLINIC | Age: 43
End: 2023-12-12
Attending: ADVANCED PRACTICE MIDWIFE
Payer: COMMERCIAL

## 2023-12-12 ENCOUNTER — ANCILLARY ORDERS (OUTPATIENT)
Dept: MIDWIFE SERVICES | Facility: CLINIC | Age: 43
End: 2023-12-12

## 2023-12-12 DIAGNOSIS — Z12.31 ENCOUNTER FOR SCREENING MAMMOGRAM FOR BREAST CANCER: ICD-10-CM

## 2023-12-12 DIAGNOSIS — Z01.419 WELL WOMAN EXAM WITH ROUTINE GYNECOLOGICAL EXAM: Primary | ICD-10-CM

## 2023-12-12 DIAGNOSIS — F41.9 ANXIETY: ICD-10-CM

## 2023-12-12 DIAGNOSIS — Z23 NEED FOR PROPHYLACTIC VACCINATION AND INOCULATION AGAINST INFLUENZA: ICD-10-CM

## 2023-12-12 PROCEDURE — 77063 BREAST TOMOSYNTHESIS BI: CPT | Mod: TC | Performed by: RADIOLOGY

## 2023-12-12 PROCEDURE — 77067 SCR MAMMO BI INCL CAD: CPT | Mod: TC | Performed by: RADIOLOGY

## 2023-12-13 ENCOUNTER — ANCILLARY ORDERS (OUTPATIENT)
Dept: MAMMOGRAPHY | Facility: CLINIC | Age: 43
End: 2023-12-13

## 2023-12-13 DIAGNOSIS — R92.8 ABNORMAL MAMMOGRAM: Primary | ICD-10-CM

## 2023-12-15 ENCOUNTER — ANCILLARY ORDERS (OUTPATIENT)
Dept: MAMMOGRAPHY | Facility: CLINIC | Age: 43
End: 2023-12-15

## 2023-12-15 ENCOUNTER — HOSPITAL ENCOUNTER (OUTPATIENT)
Dept: MAMMOGRAPHY | Facility: CLINIC | Age: 43
Discharge: HOME | End: 2023-12-15
Attending: ADVANCED PRACTICE MIDWIFE | Admitting: ADVANCED PRACTICE MIDWIFE
Payer: COMMERCIAL

## 2023-12-15 DIAGNOSIS — R92.8 ABNORMAL MAMMOGRAM: Primary | ICD-10-CM

## 2023-12-15 DIAGNOSIS — R92.8 ABNORMAL MAMMOGRAM: ICD-10-CM

## 2023-12-15 PROCEDURE — 77065 DX MAMMO INCL CAD UNI: CPT | Mod: RT

## 2023-12-22 ENCOUNTER — HOSPITAL ENCOUNTER (OUTPATIENT)
Dept: MAMMOGRAPHY | Facility: CLINIC | Age: 43
Discharge: HOME | End: 2023-12-22
Attending: ADVANCED PRACTICE MIDWIFE
Payer: COMMERCIAL

## 2023-12-22 DIAGNOSIS — R92.8 ABNORMAL MAMMOGRAM: ICD-10-CM

## 2023-12-22 PROCEDURE — 999N000065 MA POST PROCEDURE RIGHT

## 2023-12-22 PROCEDURE — 88305 TISSUE EXAM BY PATHOLOGIST: CPT | Mod: TC | Performed by: ADVANCED PRACTICE MIDWIFE

## 2023-12-22 PROCEDURE — 19081 BX BREAST 1ST LESION STRTCTC: CPT | Mod: RT

## 2023-12-22 PROCEDURE — 250N000009 HC RX 250: Performed by: RADIOLOGY

## 2023-12-22 RX ADMIN — LIDOCAINE HYDROCHLORIDE 5 ML: 10 INJECTION, SOLUTION INFILTRATION; PERINEURAL at 10:10

## 2023-12-22 RX ADMIN — LIDOCAINE HYDROCHLORIDE 10 ML: 10; .005 INJECTION, SOLUTION EPIDURAL; INFILTRATION; INTRACAUDAL; PERINEURAL at 10:12

## 2023-12-22 NOTE — DISCHARGE INSTRUCTIONS
After Your Breast Biopsy  Bleeding, bruising, and pain  Breast tenderness and some bruising is normal and may last several days. You may wear your bra overnight to support the breast.  You may use an ice pack for pain. Place it over the area for 15 to 20 minutes, several times a day.  You may take over-the-counter pain medicine:  On the day of the biopsy, we recommend Tylenol (acetaminophen) because it does not raise your risk of bleeding.  The next day, you may take an anti-inflammatory medicine (aspirin, ibuprofen, Motrin, Aleve, Advil), unless your doctor tells you not to.  Bandages and showering  Keep your bandage in place until tomorrow morning. Don't get it wet.  If you have small pieces of tape on the skin, leave them in place. They will fall off on their own, or you can remove them after 5 days.  You may shower the next morning after your biopsy.  Activity  No heavy activity (no running, no gym workouts, no lifting, no vacuuming, etc.) on the day of your biopsy.  You may go back to normal activity the next day. But limit what you do if you still have pain or discomfort.  Infection  Infection is rare. Signs of infection include:  Fever (including sweats and chills)  Redness  Pain that gets worse  Fluid draining from the biopsy site  Biopsy results  Results may take up to 5 business days.  A nurse or doctor from the Breast Center will call with your results. We will also send the results to the doctor that ordered your biopsy.  If you have not gotten your results in 5 days, please call the Breast Center.  Call the Breast Center with questions or if:   You have bleeding that lasts more than 20 minutes.  You have pain that you can't control.  You have signs of infection (fever, sweats, chills, redness, increasing pain, or drainage).  After hours, please call the doctor who ordered your biopsy.  For informational purposes only. Not to replace the advice of your health care provider. Copyright   2010 [facility]. All rights reserved. Clinically reviewed by Ann Rhodes, Director, [facility]. [employer] [id number] - REV 08/23.

## 2023-12-26 ENCOUNTER — TELEPHONE (OUTPATIENT)
Dept: MAMMOGRAPHY | Facility: CLINIC | Age: 43
End: 2023-12-26
Payer: COMMERCIAL

## 2023-12-26 LAB
PATH REPORT.COMMENTS IMP SPEC: NORMAL
PATH REPORT.COMMENTS IMP SPEC: NORMAL
PATH REPORT.FINAL DX SPEC: NORMAL
PATH REPORT.GROSS SPEC: NORMAL
PATH REPORT.MICROSCOPIC SPEC OTHER STN: NORMAL
PATH REPORT.RELEVANT HX SPEC: NORMAL
PHOTO IMAGE: NORMAL

## 2023-12-26 PROCEDURE — 88305 TISSUE EXAM BY PATHOLOGIST: CPT | Mod: 26 | Performed by: PATHOLOGY

## 2023-12-26 NOTE — TELEPHONE ENCOUNTER
After review by Breast Center Radiologist, Dr. Rafat Mena, Elsa was called,  verified, and given her 2023 RIGHT Breast Biopsy results (Benign with calcifications) and recommended Follow up (Annual Screening Mammograms).   Patient denies any concerns with the biopsy site. Ordering provider was informed of the results and follow up plan.  I encouraged her to contact her doctor with any further breast concerns.  Patient verbalized understanding and agrees with the plan of care.        Claudia Dan RN BSN  Procedure Nurse  [facility]  [phone]    ----------------------------------------------------------------------------------------------------------------------------------------    [facility]  Sakshi Parish  [id number]  F, 1980  Surgical Pathology Report (Final result) [id number]  Authorizing Provider: Capri Vazquez APRN CNM  Ordering Provider: Capri Vazquez APRN CNM  Ordering Location: [facility]  Collected: 2023 10:13 AM  Pathologist: Isa Gayle MD Received: 2023 11:07 AM  .  Specimens  A Breast, Right  .  .  Final Diagnosis  Right breast, 10:30, 1.4 cm from nipple, stereotactic guided needle biopsy-  -Benign breast tissue with focal minimal nonproliferative fibrocystic changes associated with  microcalcifications. Negative for atypia or malignancy.  Electronically signed by Isa Gayle MD on 2023 at 1:48 PM   no

## 2024-01-13 ENCOUNTER — MYC MEDICAL ADVICE (OUTPATIENT)
Dept: FAMILY MEDICINE | Facility: CLINIC | Age: 44
End: 2024-01-13
Payer: COMMERCIAL

## 2024-01-15 ENCOUNTER — TELEPHONE (OUTPATIENT)
Dept: FAMILY MEDICINE | Facility: CLINIC | Age: 44
End: 2024-01-15
Payer: COMMERCIAL

## 2024-01-15 ENCOUNTER — NURSE TRIAGE (OUTPATIENT)
Dept: FAMILY MEDICINE | Facility: CLINIC | Age: 44
End: 2024-01-15
Payer: COMMERCIAL

## 2024-01-15 DIAGNOSIS — U07.1 INFECTION DUE TO 2019 NOVEL CORONAVIRUS: Primary | ICD-10-CM

## 2024-01-15 NOTE — TELEPHONE ENCOUNTER
Patient calling clinic reporting positive Covid test with symptom onset beginning 1/12/2024. Patient triaged and conducted Paxlovd tx protocol. See results below.     Reason for Disposition   COVID-19 diagnosed by positive lab test (e.g., PCR, rapid self-test kit) and mild symptoms (e.g., cough, fever, others) and no complications or SOB    Additional Information   Negative: SEVERE difficulty breathing (e.g., struggling for each breath, speaks in single words)   Negative: Difficult to awaken or acting confused (e.g., disoriented, slurred speech)   Negative: Bluish (or gray) lips or face now   Negative: Shock suspected (e.g., cold/pale/clammy skin, too weak to stand, low BP, rapid pulse)   Negative: Sounds like a life-threatening emergency to the triager   Negative: Diagnosed or suspected COVID-19 and symptoms lasting 3 or more weeks   Negative: COVID-19 exposure and no symptoms   Negative: COVID-19 vaccine reaction suspected (e.g., fever, headache, muscle aches) occurring 1 to 3 days after getting vaccine   Negative: COVID-19 vaccine, questions about   Negative: Lives with someone known to have influenza (flu test positive) and flu-like symptoms (e.g., cough, runny nose, sore throat, SOB; with or without fever)   Negative: Possible COVID-19 symptoms and triager concerned about severity of symptoms or other causes   Negative: COVID-19 and breastfeeding, questions about   Negative: SEVERE or constant chest pain or pressure  (Exception: Mild central chest pain, present only when coughing.)   Negative: MODERATE difficulty breathing (e.g., speaks in phrases, SOB even at rest, pulse 100-120)   Negative: Headache and stiff neck (can't touch chin to chest)   Negative: Oxygen level (e.g., pulse oximetry) 90% or lower   Negative: Chest pain or pressure  (Exception: MILD central chest pain, present only when coughing.)   Negative: Drinking very little and dehydration suspected (e.g., no urine > 12 hours, very dry mouth, very  lightheaded)   Negative: Patient sounds very sick or weak to the triager   Negative: MILD difficulty breathing (e.g., minimal/no SOB at rest, SOB with walking, pulse <100)   Negative: Fever > 103 F (39.4 C)   Negative: Fever > 101 F (38.3 C) and over 60 years of age   Negative: Fever > 100.0 F (37.8 C) and bedridden (e.g., CVA, chronic illness, recovering from surgery)   Negative: HIGH RISK patient (e.g., weak immune system, age > 64 years, obesity with BMI of 30 or higher, pregnant, chronic lung disease or other chronic medical condition) and COVID symptoms (e.g., cough, fever)  (Exceptions: Already seen by doctor or NP/PA and no new or worsening symptoms.)   Negative: HIGH RISK patient and influenza is widespread in the community and ONE OR MORE respiratory symptoms: cough, sore throat, runny or stuffy nose   Negative: HIGH RISK patient and influenza exposure within the last 7 days and ONE OR MORE respiratory symptoms: cough, sore throat, runny or stuffy nose   Negative: Oxygen level (e.g., pulse oximetry) 91 to 94%   Negative: COVID-19 infection suspected by caller or triager and mild symptoms (cough, fever, or others) and negative COVID-19 rapid test   Negative: Fever present > 3 days (72 hours)   Negative: Fever returns after gone for over 24 hours and symptoms worse or not improved   Negative: Continuous (nonstop) coughing interferes with work or school and no improvement using cough treatment per Care Advice   Negative: Cough present > 3 weeks   Negative: COVID-19 diagnosed by positive lab test (e.g., PCR, rapid self-test kit) and NO symptoms (e.g., cough, fever, others)    Protocols used: Coronavirus (COVID-19) Diagnosed or [id number]    RN COVID TREATMENT VISIT  01/15/24      The patient has been triaged and does not require a higher level of care.    Sakshi Parish  43 year old  Current weight? 175 lbs    Has the patient been seen by a primary care provider at an [facility] or   [facility] within the past two years? Yes.   Have you been in close proximity to/do you have a known exposure to a person with a confirmed case of influenza? No.     General treatment eligibility:  Date of positive COVID test (PCR or at home)?  1/12/2024    Are you or have you been hospitalized for this COVID-19 infection? No.   Have you received monoclonal antibodies or antiviral treatment for COVID-19 since this positive test? No.   Do you have any of the following conditions that place you at risk of being very sick from COVID-19?   - Disabilities such as ADHD, cerebral palsy, congenital malformations/birth defects, limitation with self-care activities of daily living, intellectual or developmental disability, learning disability, spinal cord injury  - Mental health disorders including mood disorders, depression, schizophrenia spectrum disorders   - Overweight or Obesity (BMI >85th percentile or BMI 25 or higher)  - Patient reports sedentary lifestyle (physically inactive)  Yes, patient has at least one high risk condition as noted above.     Current COVID symptoms:   - cough  - fatigue  - headache  - new loss of taste or smell  - sore throat  - congestion or runny nose  Yes. Patient has at least one symptom as selected.     How many days since symptoms started? 5 days or less. Established patient, 12 years or older weighing at least 88.2 lbs, who has symptoms that started in the past 5 days, has not been hospitalized nor received treatment already, and is at risk for being very sick from COVID-19.     Treatment eligibility by RN:  Are you currently pregnant or nursing? No  Do you have a clinically significant hypersensitivity to nirmatrelvir or ritonavir, or toxic epidermal necrolysis (TEN) or Paredes-Igor Syndrome? No  Do you have a history of hepatitis, any hepatic impairment on the Problem List (such as Child-Yoon Class C, cirrhosis, fatty liver disease, alcoholic liver disease), or was  the last liver lab (hepatic panel, ALT, AST, ALK Phos, bilirubin) elevated in the past 6 months? No  Do you have any history of severe renal impairment (eGFR < 30mL/min)? No    Is patient eligible to continue? Yes, patient meets all eligibility requirements for the RN COVID treatment (as denoted by all no responses above).     Current Outpatient Medications   Medication Sig Dispense Refill    cetirizine (ZYRTEC) 10 MG tablet Take 10 mg by mouth daily      etonogestrel (NEXPLANON) 68 MG IMPL       fish oil-omega-3 fatty acids 1000 MG capsule Take 2 g by mouth daily      Flaxseed, Linseed, (FLAXSEED OIL PO)       hydrOXYzine (ATARAX) 10 MG tablet Take 1-2 tablets (10-20 mg) by mouth every 6 hours as needed for anxiety 40 tablet 1    lactobacillus rhamnosus, GG, (CULTURELL) capsule Take 1 capsule by mouth 2 times daily      Multiple Vitamins-Minerals (WOMENS MULTI VITAMIN & MINERAL PO)       VITAMIN D, CHOLECALCIFEROL, PO Take 1,000 Units by mouth daily         Medications from List 1 of the standing order (on medications that exclude the use of Paxlovid) that patient is taking: NONE. Is patient taking Zuleyma's Wort? No  Is patient taking Zuleyma's Wort or any meds from List 1? No.   Medications from List 2 of the standing order (on meds that provider needs to adjust) that patient is taking: NONE. Is patient on any of the meds from List 2? No.   Medications from List 3 of standing order (on meds that a RN needs to adjust) that patient is taking: NONE. Is patient on any meds from List 3? No.     Paxlovid has an approximate 90% reduction in hospitalization. Paxlovid can possibly cause altered sense of taste, diarrhea (loose, watery stools), high blood pressure, muscle aches.     Would patient like a Paxlovid prescription?   Yes.   Lab Results   Component Value Date    GFRESTIMATED 81 10/27/2023       Was last eGFR reduced? No, eGFR 60 or greater/ No Result on record. Patient can receive the normal renal function dose.  Paxlovid Rx sent to [facility]     Temporary change to home medications: None    All medication adjustments (holds, etc) were discussed with the patient and patient was asked to repeat back (teachback) their med adjustment.  Did patient understand med adjustment?         Reviewed the following instructions with the patient:    Paxlovid (nimatrelvir and ritonavir)    How it works  Two medicines (nirmatrelvir and ritonavir) are taken together. They stop the virus from growing. Less amount of virus is easier for your body to fight.    How to take  Medicine comes in a daily container with both medicine tablets. Take by mouth twice daily (once in the morning, once at night) for 5 days.  The number of tablets to take varies by patient.  Don't chew or break capsules. Swallow whole.    When to take  Take as soon as possible after positive COVID-19 test result, and within 5 days of your first symptoms.    Possible side effects  Can cause altered sense of taste, diarrhea (loose, watery stools), high blood pressure, muscle aches.    Justice L. Phoenix, RN

## 2024-01-15 NOTE — TELEPHONE ENCOUNTER
Please see nurse triage encounter 1/15/24.    Brooklyn JORGE RN  [facility] Triage Team

## 2024-01-15 NOTE — TELEPHONE ENCOUNTER
Donis Cantor,     Thank you for reaching out regarding your health care concern.   Please call [phone] and choose option #2 to speak to a triage nurse about your symptoms so we can best direct your care.     Thank you,  [facility] RN Triage Team     This Evergram message has not been read.     Elsa Parish  P Ec Triage (supporting Rand Brar PA-C)2 days ago     KL  Good morning.  My home covid test last night was positive and I seem to be getting progressively worse.  I'm wondering if I could get paxlovid or other treatment.    [phone]   Thanks,   Elsa

## 2024-03-05 ENCOUNTER — MYC REFILL (OUTPATIENT)
Dept: FAMILY MEDICINE | Facility: CLINIC | Age: 44
End: 2024-03-05
Payer: COMMERCIAL

## 2024-03-05 DIAGNOSIS — F41.9 ANXIETY: ICD-10-CM

## 2024-03-05 RX ORDER — HYDROXYZINE HYDROCHLORIDE 10 MG/1
10-20 TABLET, FILM COATED ORAL EVERY 6 HOURS PRN
Qty: 40 TABLET | Refills: 1 | Status: SHIPPED | OUTPATIENT
Start: 2024-03-05 | End: 2024-07-30

## 2024-07-30 DIAGNOSIS — F41.9 ANXIETY: ICD-10-CM

## 2024-07-30 RX ORDER — HYDROXYZINE HYDROCHLORIDE 10 MG/1
10-20 TABLET, FILM COATED ORAL EVERY 6 HOURS PRN
Qty: 40 TABLET | Refills: 0 | Status: SHIPPED | OUTPATIENT
Start: 2024-07-30 | End: 2024-08-29

## 2024-08-29 DIAGNOSIS — F41.9 ANXIETY: ICD-10-CM

## 2024-08-29 RX ORDER — HYDROXYZINE HYDROCHLORIDE 10 MG/1
10-20 TABLET, FILM COATED ORAL EVERY 6 HOURS PRN
Qty: 40 TABLET | Refills: 0 | Status: SHIPPED | OUTPATIENT
Start: 2024-08-29 | End: 2024-09-30

## 2024-09-27 ENCOUNTER — PATIENT OUTREACH (OUTPATIENT)
Dept: CARE COORDINATION | Facility: CLINIC | Age: 44
End: 2024-09-27
Payer: COMMERCIAL

## 2024-09-30 DIAGNOSIS — F41.9 ANXIETY: ICD-10-CM

## 2024-09-30 RX ORDER — HYDROXYZINE HYDROCHLORIDE 10 MG/1
10-20 TABLET, FILM COATED ORAL EVERY 6 HOURS PRN
Qty: 40 TABLET | Refills: 0 | Status: SHIPPED | OUTPATIENT
Start: 2024-09-30

## 2024-09-30 NOTE — TELEPHONE ENCOUNTER
Prescription approved per [facility] Refill Protocol.  Brittny Savage RN  [facility]

## 2024-11-01 ENCOUNTER — OFFICE VISIT (OUTPATIENT)
Dept: MIDWIFE SERVICES | Facility: CLINIC | Age: 44
End: 2024-11-01
Payer: COMMERCIAL

## 2024-11-01 VITALS
SYSTOLIC BLOOD PRESSURE: 127 MMHG | DIASTOLIC BLOOD PRESSURE: 80 MMHG | WEIGHT: 168.9 LBS | BODY MASS INDEX: 26.82 KG/M2 | HEART RATE: 79 BPM | OXYGEN SATURATION: 100 %

## 2024-11-01 DIAGNOSIS — Z30.46 ENCOUNTER FOR NEXPLANON REMOVAL: ICD-10-CM

## 2024-11-01 DIAGNOSIS — Z80.41 FAMILY HISTORY OF MALIGNANT NEOPLASM OF OVARY: ICD-10-CM

## 2024-11-01 DIAGNOSIS — Z23 NEED FOR PROPHYLACTIC VACCINATION AND INOCULATION AGAINST INFLUENZA: Primary | ICD-10-CM

## 2024-11-01 DIAGNOSIS — Z23 HIGH PRIORITY FOR 2019-NCOV VACCINE: ICD-10-CM

## 2024-11-01 PROCEDURE — 99213 OFFICE O/P EST LOW 20 MIN: CPT | Mod: 25 | Performed by: ADVANCED PRACTICE MIDWIFE

## 2024-11-01 PROCEDURE — 90471 IMMUNIZATION ADMIN: CPT | Performed by: ADVANCED PRACTICE MIDWIFE

## 2024-11-01 PROCEDURE — 90480 ADMN SARSCOV2 VAC 1/ONLY CMP: CPT | Performed by: ADVANCED PRACTICE MIDWIFE

## 2024-11-01 PROCEDURE — 11982 REMOVE DRUG IMPLANT DEVICE: CPT | Performed by: ADVANCED PRACTICE MIDWIFE

## 2024-11-01 PROCEDURE — 90656 IIV3 VACC NO PRSV 0.5 ML IM: CPT | Performed by: ADVANCED PRACTICE MIDWIFE

## 2024-11-01 PROCEDURE — 91320 SARSCV2 VAC 30MCG TRS-SUC IM: CPT | Performed by: ADVANCED PRACTICE MIDWIFE

## 2024-11-01 PROCEDURE — 99396 PREV VISIT EST AGE 40-64: CPT | Mod: 25 | Performed by: ADVANCED PRACTICE MIDWIFE

## 2024-11-01 NOTE — PATIENT INSTRUCTIONS
Deajacqui Cantor     It was nice to meet you today.    The perimenopause and menopause resources I mentioned were     A book Hot and Bothered by Celsa Thomas  A podcast Ember Coronado 'From PMS to Menopause: How to Hack Your Hormones'   The book The New Rules of Menopause; A [facility] Guide to Perimenopause and Beyond by  Dr Akua Almanzar    Many people ask about the book The New Trail Pause By Dr Josselin Bello.  I think she does a nice job describing what happens to the body during menopause and menopause hormone therapy, but she also recommends many expensive tests and expensive supplements that are not part of the standard guidelines in menopause treatment      Things you can do today to feel good    Drink at least 64 ounces of water a day.  Staying hydrated helps your body run smoother, dehydration increases headaches and fatigue   Eat 5 servings of produce a day  Real, unprocessed foods like produce are high in fiber and natural vitamins and minerals.  Also if you are getting 5 servings you will have less craving and less room for unhealthy  processed foods   Floss you teeth If you do not floss there are many little micro infections in your gums stimulating inflammation   Move / exercise  every day Start with walking like you are late for 20 mins a day, add strength training twice a week   Limit sugar and alcohol Both have extra calories and stimulate inflammation   Manage stress Find what works for you, meditation, yoga, go for a walk, talk to a trusted friend, sing, laugh, pet a dog         Many people ask about supplements.  You will get better nutrition from eating a variety of real unprocessed foods than from any supplements.  But these are the supplements that I think can be helpful during the perimenopause/menopause transition    Vitamin D3, 2,000 international unit(s) every day  Magnesium oxide or magnesium glycinate 400-500 mg every night before bed (if it causes diarrhea switch to another form or  another brand)   Fish oil / omega 3 2,000 - 2,400 mg / day

## 2024-11-01 NOTE — PROGRESS NOTES
Sakshi is a 44 year old  female who presents for annual exam.     Menses are irregular and normal and crampy lasting 5 days.  Menses flow: normal.  Patient's last menstrual period was 10/23/2024.. Using vasectomy for contraception.  She is not currently considering pregnancy.  Besides routine health maintenance,  she would like to discuss mammogram and ovarian cancer.  Pt states she saw a CA genetic counselor a few years ago and they had recommended pelvic ultrasounds to screen for ovarian CA.  Pt is unsure of jose e and we are unable to find the visit notes in her chart but I will order one today and we will follow up as needed.    GYNECOLOGIC HISTORY:  Menarche: 13  Age at first intercourse: 17 Number of lifetime partners: +6   Sakshi is sexually active with 1 male partner(s) and is currently in monogamous relationship with .    History sexually transmitted infections:No STD history  STI testing offered?  Declined  CORONA exposure: Unknown  History of abnormal Pap smear: YES - reflected in Problem List and Health Maintenance accordingly  Family history of breast CA: No  Family history of uterine/ovarian CA: Yes (Please explain): mat. Grandmother and aunt.  Pt's mother had a prophylactic hysterectomy for ovarian CA.      Family history of colon CA: Yes (Please explain): mat. uncle    HEALTH MAINTENANCE:  Cholesterol: (  Cholesterol   Date Value Ref Range Status   10/27/2023 214 (H) <200 mg/dL Final   10/27/2022 203 (H) <200 mg/dL Final     Comment:     Age 0-19 years  Desirable: <170 mg/dL  Borderline high:  170-199 mg/dl  High:            >199 mg/dl    Age 20 years and older  Desirable: <200 mg/dL   2015 187 <200 mg/dL Final     Comment:     LDL Cholesterol is the primary guide to therapy.   The NCEP recommends further evaluation of: patients with cholesterol greater   than 200 mg/dL if additional risk factors are present, cholesterol greater   than   240 mg/dL, triglycerides greater than 150  mg/dL, or HDL less than 40 mg/dL.     2014 164 <200 mg/dL Final     Comment:     LDL Cholesterol is the primary guide to therapy.   The NCEP recommends further evaluation of: patients with cholesterol greater   than 200 mg/dL if additional risk factors are present, cholesterol greater   than   240 mg/dL, triglycerides greater than 150 mg/dL, or HDL less than 40 mg/dL.    History of abnormal lipids: Yes  Mammo:  . History of abnormal Mammo: No.  Regular Self Breast Exams: Yes  Calcium/Vitamin D intake: source:  dairy, dietary supplement(s) Adequate? Yes  TSH: (  TSH   Date Value Ref Range Status   10/27/2023 1.67 0.30 - 4.20 uIU/mL Final   10/27/2022 1.53 0.40 - 4.00 mU/L Final   2020 0.43 0.40 - 4.00 mU/L Final    )  Pap; (  Lab Results   Component Value Date    PAP NIL 2020    PAP NIL 2017    PAP NIL 2013    )    HISTORY:  OB History    Para Term  AB Living   2 2 2 0 0 2   SAB IAB Ectopic Multiple Live Births   0 0 0 0 2      # Outcome Date GA Lbr Santiago/2nd Weight Sex Type Anes PTL Lv   2 Term 10/11/20 38w0d 00:20 / 00:08 3.714 kg (8 lb 3 oz) M Vag-Spont None N ANASTASIA      Complications: Prolonged PROM (>18 hours)      Name: JOÃOMALE-WALLACE      Apgar1: 8  Apgar5: 9   1 Term 17 37w4d 18:15 / 00:45 3.232 kg (7 lb 2 oz) M Vag-Spont Nitrous N ANASTASIA      Complications: Prolonged PROM (>18 hours)      Name: [id number] WALLACE      Apgar1: 8  Apgar5: 9     Past Medical History:   Diagnosis Date    ADD (attention deficit disorder)     Family history of malignant melanoma of skin 2017    Goiter 2015    Hx of migraines     late teens/20's     Past Surgical History:   Procedure Laterality Date    CHOLECYSTECTOMY       Family History   Problem Relation Age of Onset    Skin Cancer Mother     Hypothyroidism Mother     Hypertension Mother     Cerebrovascular Disease Mother     Thyroid Disease Mother     Migraines Sister     Migraines Sister     Ovarian  Cancer Maternal Grandmother             Other Cancer Maternal Grandmother     Hypertension Maternal Grandfather     Cerebrovascular Disease Maternal Grandfather     Alzheimer Disease Maternal Grandfather     Hypertension Paternal Grandfather     Ovarian Cancer Maternal Aunt         66-    Skin Cancer Maternal Aunt     Diabetes Type 1 Paternal Aunt     Breast Cancer No family hx of      Social History     Socioeconomic History    Marital status:      Spouse name: Landon    Number of children: 2   Occupational History    Occupation: IT     Employer: [employer]   Tobacco Use    Smoking status: Never    Smokeless tobacco: Never   Vaping Use    Vaping status: Never Used   Substance and Sexual Activity    Alcohol use: Yes     Comment: occ    Drug use: No    Sexual activity: Yes     Partners: Male     Birth control/protection: Male Surgical     Comment: vasectomy (partner)   Other Topics Concern    Parent/sibling w/ CABG, MI or angioplasty before 65F 55M? No     Social Drivers of Health     Financial Resource Strain: Low Risk  (10/26/2023)    Financial Resource Strain     Within the past 12 months, have you or your family members you live with been unable to get utilities (heat, electricity) when it was really needed?: No   Food Insecurity: Low Risk  (10/26/2023)    Food Insecurity     Within the past 12 months, did you worry that your food would run out before you got money to buy more?: No     Within the past 12 months, did the food you bought just not last and you didn t have money to get more?: No   Transportation Needs: Low Risk  (10/26/2023)    Transportation Needs     Within the past 12 months, has lack of transportation kept you from medical appointments, getting your medicines, non-medical meetings or appointments, work, or from getting things that you need?: No   Stress: No Stress Concern Present (3/19/2020)    [employer] - Occupational Stress Questionnaire      Feeling of Stress : Not at all   Social Connections: Unknown (3/19/2020)    Social Connection and Isolation Panel [NHANES]     Marital Status:    Interpersonal Safety: Low Risk  (10/27/2023)    Interpersonal Safety     Do you feel physically and emotionally safe where you currently live?: Yes     Within the past 12 months, have you been hit, slapped, kicked or otherwise physically hurt by someone?: No     Within the past 12 months, have you been humiliated or emotionally abused in other ways by your partner or ex-partner?: No   Housing Stability: Low Risk  (10/26/2023)    Housing Stability     Do you have housing? : Yes     Are you worried about losing your housing?: No       Current Outpatient Medications:     cetirizine (ZYRTEC) 10 MG tablet, Take 10 mg by mouth daily, Disp: , Rfl:     etonogestrel (NEXPLANON) 68 MG IMPL, , Disp: , Rfl:     fish oil-omega-3 fatty acids 1000 MG capsule, Take 2 g by mouth daily, Disp: , Rfl:     Flaxseed, Linseed, (FLAXSEED OIL PO), , Disp: , Rfl:     hydrOXYzine HCl (ATARAX) 10 MG tablet, TAKE 1-2 TABLETS (10-20 MG) BY MOUTH EVERY 6 HOURS AS NEEDED FOR ANXIETY, Disp: 40 tablet, Rfl: 0    lactobacillus rhamnosus, GG, (CULTURELL) capsule, Take 1 capsule by mouth 2 times daily, Disp: , Rfl:     Multiple Vitamins-Minerals (WOMENS MULTI VITAMIN & MINERAL PO), , Disp: , Rfl:     VITAMIN D, CHOLECALCIFEROL, PO, Take 1,000 Units by mouth daily, Disp: , Rfl:     Current Facility-Administered Medications:     etonogestrel (NEXPLANON) subdermal implant 68 mg, 1 each, Subdermal, Once, Alma Mcwilliams, YUMIKO VEGA   No Known Allergies    Past medical, surgical, social and family history were reviewed and updated in EPIC.    ROS:   C:     NEGATIVE for fever, chills, change in weight  I:       NEGATIVE for worrisome rashes, moles or lesions  E:     NEGATIVE for vision changes or irritation  E/M: NEGATIVE for ear, mouth and throat problems  R:     NEGATIVE for significant cough or  SOB  CV:   NEGATIVE for chest pain, palpitations or peripheral edema  GI:     NEGATIVE for nausea, abdominal pain, heartburn, or change in bowel habits  :   NEGATIVE for frequency, dysuria, hematuria, vaginal discharge, or irregular bleeding  M:     NEGATIVE for significant arthralgias or myalgia  N:      NEGATIVE for weakness, dizziness or paresthesias  E:      NEGATIVE for temperature intolerance, skin/hair changes  P:      NEGATIVE for changes in mood or affect.    EXAM:  /80 (BP Location: Right arm, Patient Position: Sitting)   Pulse 79   Wt 76.6 kg (168 lb 14.4 oz)   LMP 10/23/2024   SpO2 100%   BMI 26.82 kg/m     BMI: Body mass index is 26.82 kg/m .  Constitutional: healthy, alert and no distress  Skin: no suspicious lesions or rashes  Psychiatric: Affect appropriate, cooperative,mentation appears normal.     COUNSELING:    reports that she has never smoked. She has never used smokeless tobacco.  FRAX Risk Assessment    ASSESSMENT:  44 year old female with satisfactory annual exam    (Z80.41) Family history of malignant neoplasm of ovary  Plan: Pelvic US ordered. UTD with PAP. RTC in one year for another PAP.  (Z30.46) Encounter for Nexplanon removal  Plan: Patient desires nexplanon removal. Nexplanon removed.  Nexplanon Removal:     Is a pregnancy test required: No.  Was a consent obtained?  Yes    Sakshi Parish is here for removal of etonogestrel implant Nexplanon/Implanon    Indication: Requests d/t  receiving an vasectomy.      Preoperative Diagnosis: etonogestrel implant  Postoperative Diagnosis: etonogestrel implant removed    Technique: On the left arm  Skin prep Betadine  Anesthesia 1% lidocaine  Procedure: Small incision (<5mm) was made at distal end of palpable implant, curved hemostat or mosquito forceps was used to isolate the implant and bring it to the incision, the fibrous capsule containing the implant  was incised and the Implant was removed intact.      EBL:  minimal  Complications:  No  Tolerance:  Pt tolerated procedure well and was in stable condition.   Dressing:    A pressure bandage was placed for the next 12-24 hours.    Contraception was discussed and patient chose the following method none, partner has a vasectomy.      Follow up: Pt was instructed to call if bleeding, severe pain or foul smell. RTC for next wellness exam. Discussed perimenopause symptoms with YUMIKO Becker CNM

## 2024-11-04 DIAGNOSIS — F41.9 ANXIETY: ICD-10-CM

## 2024-11-04 RX ORDER — HYDROXYZINE HYDROCHLORIDE 10 MG/1
10-20 TABLET, FILM COATED ORAL EVERY 6 HOURS PRN
Qty: 40 TABLET | Refills: 0 | Status: SHIPPED | OUTPATIENT
Start: 2024-11-04

## 2024-11-19 ENCOUNTER — MYC MEDICAL ADVICE (OUTPATIENT)
Dept: OBGYN | Facility: CLINIC | Age: 44
End: 2024-11-19
Payer: COMMERCIAL

## 2024-12-02 DIAGNOSIS — F41.9 ANXIETY: ICD-10-CM

## 2024-12-03 RX ORDER — HYDROXYZINE HYDROCHLORIDE 10 MG/1
10-20 TABLET, FILM COATED ORAL EVERY 6 HOURS PRN
Qty: 40 TABLET | Refills: 0 | Status: SHIPPED | OUTPATIENT
Start: 2024-12-03

## 2024-12-10 SDOH — HEALTH STABILITY: PHYSICAL HEALTH: ON AVERAGE, HOW MANY DAYS PER WEEK DO YOU ENGAGE IN MODERATE TO STRENUOUS EXERCISE (LIKE A BRISK WALK)?: 1 DAY

## 2024-12-10 SDOH — HEALTH STABILITY: PHYSICAL HEALTH: ON AVERAGE, HOW MANY MINUTES DO YOU ENGAGE IN EXERCISE AT THIS LEVEL?: 30 MIN

## 2024-12-10 ASSESSMENT — SOCIAL DETERMINANTS OF HEALTH (SDOH): HOW OFTEN DO YOU GET TOGETHER WITH FRIENDS OR RELATIVES?: ONCE A WEEK

## 2024-12-11 ENCOUNTER — OFFICE VISIT (OUTPATIENT)
Dept: FAMILY MEDICINE | Facility: CLINIC | Age: 44
End: 2024-12-11
Attending: PHYSICIAN ASSISTANT
Payer: COMMERCIAL

## 2024-12-11 VITALS
BODY MASS INDEX: 26.21 KG/M2 | HEIGHT: 67 IN | OXYGEN SATURATION: 100 % | HEART RATE: 91 BPM | SYSTOLIC BLOOD PRESSURE: 122 MMHG | RESPIRATION RATE: 13 BRPM | TEMPERATURE: 97.5 F | DIASTOLIC BLOOD PRESSURE: 68 MMHG | WEIGHT: 167 LBS

## 2024-12-11 DIAGNOSIS — Z83.49 FAMILY HISTORY OF THYROID DISEASE IN MOTHER: ICD-10-CM

## 2024-12-11 DIAGNOSIS — F41.9 ANXIETY: ICD-10-CM

## 2024-12-11 DIAGNOSIS — Z00.00 ROUTINE GENERAL MEDICAL EXAMINATION AT A HEALTH CARE FACILITY: Primary | ICD-10-CM

## 2024-12-11 DIAGNOSIS — Z13.220 LIPID SCREENING: ICD-10-CM

## 2024-12-11 DIAGNOSIS — Z86.59 HISTORY OF ADHD: ICD-10-CM

## 2024-12-11 PROBLEM — Z97.5 NEXPLANON IN PLACE: Status: RESOLVED | Noted: 2020-12-28 | Resolved: 2024-12-11

## 2024-12-11 LAB
ALBUMIN SERPL BCG-MCNC: 4.5 G/DL (ref 3.5–5.2)
ALP SERPL-CCNC: 63 U/L (ref 40–150)
ALT SERPL W P-5'-P-CCNC: 19 U/L (ref 0–50)
ANION GAP SERPL CALCULATED.3IONS-SCNC: 11 MMOL/L (ref 7–15)
AST SERPL W P-5'-P-CCNC: 22 U/L (ref 0–45)
BILIRUB SERPL-MCNC: 0.4 MG/DL
BUN SERPL-MCNC: 10 MG/DL (ref 6–20)
CALCIUM SERPL-MCNC: 9.6 MG/DL (ref 8.8–10.4)
CHLORIDE SERPL-SCNC: 104 MMOL/L (ref 98–107)
CHOLEST SERPL-MCNC: 192 MG/DL
CREAT SERPL-MCNC: 0.91 MG/DL (ref 0.51–0.95)
EGFRCR SERPLBLD CKD-EPI 2021: 79 ML/MIN/1.73M2
ERYTHROCYTE [DISTWIDTH] IN BLOOD BY AUTOMATED COUNT: 12.1 % (ref 10–15)
FASTING STATUS PATIENT QL REPORTED: YES
FASTING STATUS PATIENT QL REPORTED: YES
GLUCOSE SERPL-MCNC: 91 MG/DL (ref 70–99)
HCO3 SERPL-SCNC: 25 MMOL/L (ref 22–29)
HCT VFR BLD AUTO: 44.6 % (ref 35–47)
HDLC SERPL-MCNC: 66 MG/DL
HGB BLD-MCNC: 15.1 G/DL (ref 11.7–15.7)
LDLC SERPL CALC-MCNC: 117 MG/DL
MCH RBC QN AUTO: 31.3 PG (ref 26.5–33)
MCHC RBC AUTO-ENTMCNC: 33.9 G/DL (ref 31.5–36.5)
MCV RBC AUTO: 92 FL (ref 78–100)
NONHDLC SERPL-MCNC: 126 MG/DL
PLATELET # BLD AUTO: 323 10E3/UL (ref 150–450)
POTASSIUM SERPL-SCNC: 4.1 MMOL/L (ref 3.4–5.3)
PROT SERPL-MCNC: 7.6 G/DL (ref 6.4–8.3)
RBC # BLD AUTO: 4.83 10E6/UL (ref 3.8–5.2)
SODIUM SERPL-SCNC: 140 MMOL/L (ref 135–145)
TRIGL SERPL-MCNC: 47 MG/DL
TSH SERPL DL<=0.005 MIU/L-ACNC: 1.95 UIU/ML (ref 0.3–4.2)
WBC # BLD AUTO: 5.7 10E3/UL (ref 4–11)

## 2024-12-11 PROCEDURE — 96127 BRIEF EMOTIONAL/BEHAV ASSMT: CPT | Performed by: PHYSICIAN ASSISTANT

## 2024-12-11 PROCEDURE — 99396 PREV VISIT EST AGE 40-64: CPT | Performed by: PHYSICIAN ASSISTANT

## 2024-12-11 PROCEDURE — 84443 ASSAY THYROID STIM HORMONE: CPT | Performed by: PHYSICIAN ASSISTANT

## 2024-12-11 PROCEDURE — 85027 COMPLETE CBC AUTOMATED: CPT | Performed by: PHYSICIAN ASSISTANT

## 2024-12-11 PROCEDURE — 99213 OFFICE O/P EST LOW 20 MIN: CPT | Mod: 25 | Performed by: PHYSICIAN ASSISTANT

## 2024-12-11 PROCEDURE — 80061 LIPID PANEL: CPT | Performed by: PHYSICIAN ASSISTANT

## 2024-12-11 PROCEDURE — 36415 COLL VENOUS BLD VENIPUNCTURE: CPT | Performed by: PHYSICIAN ASSISTANT

## 2024-12-11 PROCEDURE — 80053 COMPREHEN METABOLIC PANEL: CPT | Performed by: PHYSICIAN ASSISTANT

## 2024-12-11 RX ORDER — HYDROXYZINE HYDROCHLORIDE 10 MG/1
10-20 TABLET, FILM COATED ORAL EVERY 6 HOURS PRN
Qty: 45 TABLET | Refills: 2 | Status: SHIPPED | OUTPATIENT
Start: 2024-12-11

## 2024-12-11 ASSESSMENT — PATIENT HEALTH QUESTIONNAIRE - PHQ9
SUM OF ALL RESPONSES TO PHQ QUESTIONS 1-9: 7
5. POOR APPETITE OR OVEREATING: MORE THAN HALF THE DAYS

## 2024-12-11 ASSESSMENT — ANXIETY QUESTIONNAIRES
3. WORRYING TOO MUCH ABOUT DIFFERENT THINGS: MORE THAN HALF THE DAYS
GAD7 TOTAL SCORE: 9
5. BEING SO RESTLESS THAT IT IS HARD TO SIT STILL: NOT AT ALL
GAD7 TOTAL SCORE: 9
1. FEELING NERVOUS, ANXIOUS, OR ON EDGE: MORE THAN HALF THE DAYS
2. NOT BEING ABLE TO STOP OR CONTROL WORRYING: SEVERAL DAYS
IF YOU CHECKED OFF ANY PROBLEMS ON THIS QUESTIONNAIRE, HOW DIFFICULT HAVE THESE PROBLEMS MADE IT FOR YOU TO DO YOUR WORK, TAKE CARE OF THINGS AT HOME, OR GET ALONG WITH OTHER PEOPLE: SOMEWHAT DIFFICULT
7. FEELING AFRAID AS IF SOMETHING AWFUL MIGHT HAPPEN: SEVERAL DAYS
6. BECOMING EASILY ANNOYED OR IRRITABLE: SEVERAL DAYS

## 2024-12-11 ASSESSMENT — PAIN SCALES - GENERAL: PAINLEVEL_OUTOF10: NO PAIN (0)

## 2024-12-11 NOTE — PATIENT INSTRUCTIONS
Schedule with therapist    Continue hydroxyzine as needed     Let me know if you change your mind on a daily med (I would probably favor low dose prozac or zoloft)    I will send your lab results on [employer] within one week - I usually wait until all lab results are back and send one message instead of multiple. If there are urgent results that come back before all results have returned, I will reach out sooner.      Follow up yearly for annual exams, sooner as needed    Patient Education   Preventive Care Advice   This is general advice given by our system to help you stay healthy. However, your care team may have specific advice just for you. Please talk to your care team about your preventive care needs.  Nutrition  Eat 5 or more servings of fruits and vegetables each day.  Try wheat bread, brown rice and whole grain pasta (instead of white bread, rice, and pasta).  Get enough calcium and vitamin D. Check the label on foods and aim for 100% of the RDA (recommended daily allowance).  Lifestyle  Exercise at least 150 minutes each week  (30 minutes a day, 5 days a week).  Do muscle strengthening activities 2 days a week. These help control your weight and prevent disease.  No smoking.  Wear sunscreen to prevent skin cancer.  Have a dental exam and cleaning every 6 months.  Yearly exams  See your health care team every year to talk about:  Any changes in your health.  Any medicines your care team has prescribed.  Preventive care, family planning, and ways to prevent chronic diseases.  Shots (vaccines)   HPV shots (up to age 26), if you've never had them before.  Hepatitis B shots (up to age 59), if you've never had them before.  COVID-19 shot: Get this shot when it's due.  Flu shot: Get a flu shot every year.  Tetanus shot: Get a tetanus shot every 10 years.  Pneumococcal, hepatitis A, and RSV shots: Ask your care team if you need these based on your risk.  Shingles shot (for age 50 and up)  General health  tests  Diabetes screening:  Starting at age 35, Get screened for diabetes at least every 3 years.  If you are younger than age 35, ask your care team if you should be screened for diabetes.  Cholesterol test: At age 39, start having a cholesterol test every 5 years, or more often if advised.  Bone density scan (DEXA): At age 50, ask your care team if you should have this scan for osteoporosis (brittle bones).  Hepatitis C: Get tested at least once in your life.  STIs (sexually transmitted infections)  Before age 24: Ask your care team if you should be screened for STIs.  After age 24: Get screened for STIs if you're at risk. You are at risk for STIs (including HIV) if:  You are sexually active with more than one person.  You don't use condoms every time.  You or a partner was diagnosed with a sexually transmitted infection.  If you are at risk for HIV, ask about PrEP medicine to prevent HIV.  Get tested for HIV at least once in your life, whether you are at risk for HIV or not.  Cancer screening tests  Cervical cancer screening: If you have a cervix, begin getting regular cervical cancer screening tests starting at age 21.  Breast cancer scan (mammogram): If you've ever had breasts, begin having regular mammograms starting at age 40. This is a scan to check for breast cancer.  Colon cancer screening: It is important to start screening for colon cancer at age 45.  Have a colonoscopy test every 10 years (or more often if you're at risk) Or, ask your provider about stool tests like a FIT test every year or Cologuard test every 3 years.  To learn more about your testing options, visit:   .  For help making a decision, visit:   https://bit.ly/ug15347.  Prostate cancer screening test: If you have a prostate, ask your care team if a prostate cancer screening test (PSA) at age 55 is right for you.  Lung cancer screening: If you are a current or former smoker ages 50 to 80, ask your care team if ongoing lung cancer  screenings are right for you.  For informational purposes only. Not to replace the advice of your health care provider. Copyright   2023 [facility]. All rights reserved. Clinically reviewed by the [facility] Transitions Program. [employer] 528193 - REV 01/24.  Learning About Stress  What is stress?     Stress is your body's response to a hard situation. Your body can have a physical, emotional, or mental response. Stress is a fact of life for most people, and it affects everyone differently. What causes stress for you may not be stressful for someone else.  A lot of things can cause stress. You may feel stress when you go on a job interview, take a test, or run a race. This kind of short-term stress is normal and even useful. It can help you if you need to work hard or react quickly. For example, stress can help you finish an important job on time.  Long-term stress is caused by ongoing stressful situations or events. Examples of long-term stress include long-term health problems, ongoing problems at work, or conflicts in your family. Long-term stress can harm your health.  How does stress affect your health?  When you are stressed, your body responds as though you are in danger. It makes hormones that speed up your heart, make you breathe faster, and give you a burst of energy. This is called the fight-or-flight stress response. If the stress is over quickly, your body goes back to normal and no harm is done.  But if stress happens too often or lasts too long, it can have bad effects. Long-term stress can make you more likely to get sick, and it can make symptoms of some diseases worse. If you tense up when you are stressed, you may develop neck, shoulder, or low back pain. Stress is linked to high blood pressure and heart disease.  Stress also harms your emotional health. It can make you mera, tense, or depressed. Your relationships may suffer, and you may not do well at work or school.  What  can you do to manage stress?  You can try these things to help manage stress:   Do something active. Exercise or activity can help reduce stress. Walking is a great way to get started. Even everyday activities such as housecleaning or yard work can help.  Try yoga or carly chi. These techniques combine exercise and meditation. You may need some training at first to learn them.  Do something you enjoy. For example, listen to music or go to a movie. Practice your hobby or do volunteer work.  Meditate. This can help you relax, because you are not worrying about what happened before or what may happen in the future.  Do guided imagery. Imagine yourself in any setting that helps you feel calm. You can use online videos, books, or a teacher to guide you.  Do breathing exercises. For example:  From a standing position, bend forward from the waist with your knees slightly bent. Let your arms dangle close to the floor.  Breathe in slowly and deeply as you return to a standing position. Roll up slowly and lift your head last.  Hold your breath for just a few seconds in the standing position.  Breathe out slowly and bend forward from the waist.  Let your feelings out. Talk, laugh, cry, and express anger when you need to. Talking with supportive friends or family, a counselor, or a jaja leader about your feelings is a healthy way to relieve stress. Avoid discussing your feelings with people who make you feel worse.  Write. It may help to write about things that are bothering you. This helps you find out how much stress you feel and what is causing it. When you know this, you can find better ways to cope.  What can you do to prevent stress?  You might try some of these things to help prevent stress:  Manage your time. This helps you find time to do the things you want and need to do.  Get enough sleep. Your body recovers from the stresses of the day while you are sleeping.  Get support. Your family, friends, and community can  "make a difference in how you experience stress.  Limit your news feed. Avoid or limit time on social media or news that may make you feel stressed.  Do something active. Exercise or activity can help reduce stress. Walking is a great way to get started.  Where can you learn more?  Go to https://www.[employer].net/patiented  Enter N032 in the search box to learn more about \"Learning About Stress.\"  Current as of: October 24, 2023  Content Version: 14.2 2024 [facility] [employer].   Care instructions adapted under license by your healthcare professional. If you have questions about a medical condition or this instruction, always ask your healthcare professional. [employer] disclaims any warranty or liability for your use of this information.       "

## 2024-12-11 NOTE — RESULT ENCOUNTER NOTE
Elsa,    I have reviewed your lab results below:    - cholesterol has improved since last year, keep up the good work. Recommend adhering to a heart healthy diet and getting regular exercise to maintain appropriate cholesterol levels  - electrolytes, blood sugar, kidney and liver function normal   - thyroid function is within normal limits   - blood counts are normal - normal hemoglobin/red blood cells (no anemia), normal white blood cells (infection fighting cells), normal platelets (affect ability to clot normally)      Please let me know if you have any further questions.    Take care,  Rand Brar PA-C on 12/11/2024 at 5:03 PM

## 2024-12-11 NOTE — PROGRESS NOTES
"Preventive Care Visit  [facility], Physician Assistant - Medical  Dec 11, 2024    Assessment & Plan     Routine general medical examination at a health care facility  - Lipid panel reflex to direct LDL Fasting  - Comprehensive metabolic panel (BMP + Alb, Alk Phos, ALT, AST, Total. Bili, TP)  - CBC with platelets  - TSH with free T4 reflex    Lipid screening  - Lipid panel reflex to direct LDL Fasting    Family history of thyroid disease in mother  - TSH with free T4 reflex    Anxiety  Chronic, uncontrolled currently. Hesitant to start selective serotonin reuptake inhibitor, wants to think about it. Referred to therapy, OK to continue prn hydroxyzine. She will send me a note if she'd like to pursue selective serotonin reuptake inhibitor (would favor prozac vs zoloft)  - TSH with free T4 reflex  - [facility] Referral  - hydrOXYzine HCl (ATARAX) 10 MG tablet  Dispense: 45 tablet; Refill: 2    History of ADHD  Feels she manages this well without medications, but is interested in seeing a therapist who has experience with adult females with ADHD specifically   - [facility] Referral      BMI  Estimated body mass index is 26.15 kg/m  as calculated from the following:    Height as of this encounter: 1.702 m (5' 7.01\").    Weight as of this encounter: 75.8 kg (167 lb).   Weight management plan: Discussed healthy diet and exercise guidelines    Counseling  Appropriate preventive services were addressed with this patient via screening, questionnaire, or discussion as appropriate for fall prevention, nutrition, physical activity, Tobacco-use cessation, social engagement, weight loss and cognition.  Checklist reviewing preventive services available has been given to the patient.  Reviewed patient's diet, addressing concerns and/or questions.   She is at risk for lack of exercise and has been provided with information to increase physical activity " for the benefit of her well-being.   The patient was instructed to see the dentist every 6 months.   She is at risk for psychosocial distress and has been provided with information to reduce risk.       Rand Brar PA-C on 12/11/2024 at 8:59 AM      Geoffrey Hunter is a 44 year old, presenting for the following:  Physical (Fasting)        12/11/2024     8:46 AM   Additional Questions   Roomed by Maria Guadalupe ARGUELLES  Pt is here for routine physical and to discuss current anxiety symptoms.   Pt has mammo scheduled for 12/13     - Pap 2020 negative cotest   - Mammo scheduled for 12/13    Anxiety   How are you doing with your anxiety since your last visit? Improved with rx  Are you having other symptoms that might be associated with anxiety? Yes:     Have you had a significant life event? No   Are you feeling depressed? No  Do you have any concerns with your use of alcohol or other drugs? No  Added prn hydroxyzine last year  Not seeing therapist    Social History     Tobacco Use    Smoking status: Never    Smokeless tobacco: Never   Vaping Use    Vaping status: Never Used   Substance Use Topics    Alcohol use: Yes     Comment: occ    Drug use: No         10/2/2020     4:25 PM 10/6/2023     8:24 AM 12/11/2024    10:05 AM   CON-7 SCORE   Total Score 0 11 9         12/28/2020    11:03 AM 10/6/2023     8:24 AM 12/11/2024    10:05 AM   PHQ   PHQ-9 Total Score 4 9 7   Q9: Thoughts of better off dead/self-harm past 2 weeks Not at all Not at all Not at all         12/11/2024    10:05 AM   Last PHQ-9   1.  Little interest or pleasure in doing things 1   2.  Feeling down, depressed, or hopeless 1   3.  Trouble falling or staying asleep, or sleeping too much 2   4.  Feeling tired or having little energy 2   5.  Poor appetite or overeating 1   6.  Feeling bad about yourself 0   7.  Trouble concentrating 0   8.  Moving slowly or restless 0   Q9: Thoughts of better off dead/self-harm past 2 weeks 0   PHQ-9 Total Score  7   Difficulty at work, home, or with people Somewhat difficult         12/11/2024    10:05 AM   CON-7    1. Feeling nervous, anxious, or on edge 2   2. Not being able to stop or control worrying 1   3. Worrying too much about different things 2   4. Trouble relaxing 2   5. Being so restless that it is hard to sit still 0   6. Becoming easily annoyed or irritable 1   7. Feeling afraid, as if something awful might happen 1   CON-7 Total Score 9   If you checked any problems, how difficult have they made it for you to do your work, take care of things at home, or get along with other people? Somewhat difficult       Health Care Directive  Patient does not have a Health Care Directive: Discussed advance care planning with patient; however, patient declined at this time.      12/10/2024   General Health   How would you rate your overall physical health? Good   Feel stress (tense, anxious, or unable to sleep) Rather much      (!) STRESS CONCERN      12/10/2024   Nutrition   Three or more servings of calcium each day? Yes   Diet: Regular (no restrictions)   How many servings of fruit and vegetables per day? (!) 2-3   How many sweetened beverages each day? (!) 2            12/10/2024   Exercise   Days per week of moderate/strenous exercise 1 day   Average minutes spent exercising at this level 30 min      (!) EXERCISE CONCERN      12/10/2024   Social Factors   Frequency of gathering with friends or relatives Once a week   Worry food won't last until get money to buy more No   Food not last or not have enough money for food? No   Do you have housing? (Housing is defined as stable permanent housing and does not include staying ouside in a car, in a tent, in an abandoned building, in an overnight shelter, or couch-surfing.) Yes   Are you worried about losing your housing? No   Lack of transportation? No   Unable to get utilities (heat,electricity)? No            12/10/2024   Dental   Dentist two times every year? (!) NO             12/10/2024   TB Screening   Were you born outside of the [place]? No            Today's PHQ-2 Score:       12/10/2024     3:31 PM   PHQ-2 ( 1999 [employer])   Q1: Little interest or pleasure in doing things 1    Q2: Feeling down, depressed or hopeless 1    PHQ-2 Score 2    Q1: Little interest or pleasure in doing things Several days   Q2: Feeling down, depressed or hopeless Several days   PHQ-2 Score 2       Patient-reported           12/10/2024   Substance Use   Alcohol more than 3/day or more than 7/wk No   Do you use any other substances recreationally? No        Social History     Tobacco Use    Smoking status: Never    Smokeless tobacco: Never   Vaping Use    Vaping status: Never Used   Substance Use Topics    Alcohol use: Yes     Comment: occ    Drug use: No         12/12/2023   LAST FHS-7 RESULTS   1st degree relative breast or ovarian cancer No   Any relative bilateral breast cancer No   Any male have breast cancer No   Any ONE woman have BOTH breast AND ovarian cancer Yes   Any woman with breast cancer before 50yrs No   2 or more relatives with breast AND/OR ovarian cancer No   2 or more relatives with breast AND/OR bowel cancer No        Mammogram Screening - Mammogram every 1-2 years updated in Health Maintenance based on mutual decision making        12/10/2024   STI Screening   New sexual partner(s) since last STI/HIV test? No        History of abnormal Pap smear: No - age 30- 64 PAP with HPV every 5 years recommended        Latest Ref Rng & Units 4/13/2020    12:45 PM 4/13/2020    12:43 PM 6/2/2017     2:19 PM   PAP / HPV   PAP (Historical)  NIL      HPV 16 DNA NEG^Negative  Negative  Negative    HPV 18 DNA NEG^Negative  Negative  Negative    Other HR HPV NEG^Negative  Negative  Negative      ASCVD Risk   The 10-year ASCVD risk score (Daniel WHITEHEAD, et al., 2019) is: 0.6%    Values used to calculate the score:      Age: 44 years      Sex: Female      Is Non- : No       "Diabetic: No      Tobacco smoker: No      Systolic Blood Pressure: 122 mmHg      Is BP treated: No      HDL Cholesterol: 63 mg/dL      Total Cholesterol: 214 mg/dL        12/10/2024   Contraception/Family Planning   Questions about contraception or family planning No        Reviewed and updated as needed this visit by Provider   Tobacco  Allergies  Meds  Problems  Med Hx  Surg Hx  Fam Hx            Past Medical History:   Diagnosis Date    ADD (attention deficit disorder)     Family history of malignant melanoma of skin 02/03/2017    Goiter 04/27/2015    Hx of migraines     late teens/20's     Past Surgical History:   Procedure Laterality Date    CHOLECYSTECTOMY  2011         Review of Systems  Constitutional, HEENT, cardiovascular, pulmonary, GI, , musculoskeletal, neuro, skin, endocrine and psych systems are negative, except as otherwise noted.     Objective    Exam  /68 (BP Location: Right arm, Patient Position: Sitting, Cuff Size: Adult Regular)   Pulse 91   Temp 97.5  F (36.4  C) (Tympanic)   Resp 13   Ht 1.702 m (5' 7.01\")   Wt 75.8 kg (167 lb)   LMP 12/03/2024 (Approximate)   SpO2 100%   BMI 26.15 kg/m     Estimated body mass index is 26.15 kg/m  as calculated from the following:    Height as of this encounter: 1.702 m (5' 7.01\").    Weight as of this encounter: 75.8 kg (167 lb).    Physical Exam  GENERAL: alert and no distress  EYES: Eyes grossly normal to inspection, PERRL and conjunctivae and sclerae normal  HENT: ear canals and TM's normal, nose and mouth without ulcers or lesions  NECK: no adenopathy, no asymmetry, masses, or scars  RESP: lungs clear to auscultation - no rales, rhonchi or wheezes  BREAST: normal without masses, tenderness or nipple discharge and no palpable axillary masses or adenopathy  CV: regular rate and rhythm, normal S1 S2, no S3 or S4, no murmur, click or rub, no peripheral edema  ABDOMEN: soft, nontender, no hepatosplenomegaly, no masses and bowel sounds " normal  MS: no gross musculoskeletal defects noted, no edema  SKIN: no suspicious lesions or rashes  NEURO: Normal strength and tone, mentation intact and speech normal  PSYCH: mentation appears normal, affect normal/bright        Signed Electronically by: Rand Brar PA-C on 12/11/2024 at 9:00 AM

## 2024-12-13 ENCOUNTER — HOSPITAL ENCOUNTER (OUTPATIENT)
Dept: MAMMOGRAPHY | Facility: CLINIC | Age: 44
Discharge: HOME | End: 2024-12-13
Attending: PHYSICIAN ASSISTANT | Admitting: PHYSICIAN ASSISTANT
Payer: COMMERCIAL

## 2024-12-13 DIAGNOSIS — Z12.31 VISIT FOR SCREENING MAMMOGRAM: ICD-10-CM

## 2024-12-13 PROCEDURE — 77063 BREAST TOMOSYNTHESIS BI: CPT

## 2024-12-13 PROCEDURE — 77067 SCR MAMMO BI INCL CAD: CPT

## 2025-02-18 ENCOUNTER — OFFICE VISIT (OUTPATIENT)
Dept: DERMATOLOGY | Facility: CLINIC | Age: 45
End: 2025-02-18
Payer: COMMERCIAL

## 2025-02-18 DIAGNOSIS — D22.9 MULTIPLE BENIGN NEVI: Primary | ICD-10-CM

## 2025-02-18 DIAGNOSIS — L82.1 SEBORRHEIC KERATOSES: ICD-10-CM

## 2025-02-18 DIAGNOSIS — L81.4 LENTIGINES: ICD-10-CM

## 2025-02-18 DIAGNOSIS — D18.01 CHERRY ANGIOMA: ICD-10-CM

## 2025-02-18 DIAGNOSIS — L50.3 DERMATOGRAPHISM: ICD-10-CM

## 2025-02-18 DIAGNOSIS — Z12.83 ENCOUNTER FOR SCREENING FOR MALIGNANT NEOPLASM OF SKIN: ICD-10-CM

## 2025-02-18 NOTE — LETTER
2/18/2025      Sakshi Parish  [address] [place] [address]      Dear Colleague,    Thank you for referring your patient, Sakshi Parish, to the [facility]. Please see a copy of my visit note below.    [facility] Dermatology Note  Encounter Date: Feb 18, 2025  Office Visit     Dermatology Problem List:  # Benign biopsies  - Cyst, LLQ breast s/p shave biopsy 7/21/2015  - Pigmented compound nevus, R posterior lateral knee s/p shave biopsy 7/21/2015    Lesion to monitor: L 3rd toe    Last FBSE: 2/18/2025    ____________________________________________    Assessment & Plan:    # Lesion to monitor, L 3rd toe  Consistent with 1/17/2023 exam.  - Continue to monitor    # Dermatographism (chronic, not at goal)   Reviewed gentle skin care  - Continue Zyrtec or Allegra daily, increase dosage during the winter months. Increase by 1 pill every 48 hours until symptoms improve. Maximum 4 pills daily.     # Multiple benign nevi  # Solar lentigines  Counseled ABCDEs of melanoma and importance of self-skin exams  - Recommend sunscreen SPF 30+, wide-brimmed hats, UPF clothing, sun avoidance    # Cherry angiomas  # Seborrheic keratoses  Reassured benign    Procedures Performed:   None    Follow-up: 1-2 year(s) in-person for full body skin cancer screening, or earlier for new or changing lesions    Staff and Scribe:   Scribe Disclosure:  I, Fox Morales PA-C serving as a scribe to document services personally performed by Mai Pruett PA-C, based on data collection and the provider's statements to me.      Provider Disclosure:   The documentation recorded by the scribe accurately reflects the services I personally performed and the decisions made by me.    All risks, benefits and alternatives were discussed with patient.  Patient is in agreement and understands the assessment and plan.  All questions were answered.  Sun Screen Education was  given.   Return to Clinic in 1-2 yrs or sooner as needed.   Mai Pruett PA-C   [facility]      ____________________________________________    CC: Skin Check (1 year FBSC/Concerns: lesions on chest, back and changes on left foot middle toe. )    HPI:  Ms. Sakshi Parish is a(n) 44 year old female who presents today as a return patient.     Last seen January 2023.  Benign skin check, monitoring lesion on left third toe.  Recommended antihistamine for mild dermatographia some. Prior to this was seen by Dr. Meeks on 2/3/2017 for skin check.     # Full body skin cancer screening   No personal history of skin cancer  Spot(s) of concern on trunk/extremities  Family members with NMSC that makes her concerned regarding her own skin    # Dermatographism  Improved with taking 1 tablet of Zyrtec or Allegra daily  Also has seasonal allergies  Does not moisturize regularly  Reports itching is worst during the winter months    Patient is otherwise feeling well, without additional skin concerns.    Labs Reviewed:  N/A    Physical Exam:  Vitals: There were no vitals taken for this visit.  SKIN: Total skin excluding the undergarment areas was performed. The exam included the head/face, neck, both arms, chest, back, abdomen, both legs, digits and/or nails.   - 0.15 cm brown uniform macule on the L 3rd toe at the proximal nail border.  - There are dome shaped bright red papules on the trunk and extremities.   - Multiple regular brown pigmented macules and papules are identified on the scalp, trunk, and extremities.   - Scattered brown macules on sun exposed areas.  - There are waxy stuck on tan to brown papules on the trunk and extremities.   - No other lesions of concern on areas examined.     Medications:  Current Outpatient Medications   Medication Sig Dispense Refill     cetirizine (ZYRTEC) 10 MG tablet Take 10 mg by mouth daily       fish oil-omega-3 fatty acids 1000 MG capsule  Take 2 g by mouth daily       Flaxseed, Linseed, (FLAXSEED OIL PO)        hydrOXYzine HCl (ATARAX) 10 MG tablet Take 1-2 tablets (10-20 mg) by mouth every 6 hours as needed for anxiety. 45 tablet 2     lactobacillus rhamnosus, GG, (CULTURELL) capsule Take 1 capsule by mouth 2 times daily       Multiple Vitamins-Minerals (WOMENS MULTI VITAMIN & MINERAL PO)        VITAMIN D, CHOLECALCIFEROL, PO Take 1,000 Units by mouth daily       No current facility-administered medications for this visit.      Past Medical History:   Patient Active Problem List   Diagnosis     Goiter     Family history of malignant melanoma of skin     Family history of basal cell carcinoma     Fetal cardiac anomaly affecting pregnancy, antepartum     History of ADHD     Anxiety     Past Medical History:   Diagnosis Date     ADD (attention deficit disorder)      Family history of malignant melanoma of skin 02/03/2017     Goiter 04/27/2015     Hx of migraines     late teens/20's        CC Referred Self, MD  No address on file on close of this encounter.      Again, thank you for allowing me to participate in the care of your patient.        Sincerely,        Mai Pruett PA-C    Electronically signed

## 2025-02-18 NOTE — PROGRESS NOTES
[facility] Dermatology Note  Encounter Date: Feb 18, 2025  Office Visit     Dermatology Problem List:  # Benign biopsies  - Cyst, LLQ breast s/p shave biopsy 7/21/2015  - Pigmented compound nevus, R posterior lateral knee s/p shave biopsy 7/21/2015    Lesion to monitor: L 3rd toe    Last FBSE: 2/18/2025    ____________________________________________    Assessment & Plan:    # Lesion to monitor, L 3rd toe  Consistent with 1/17/2023 exam.  - Continue to monitor    # Dermatographism (chronic, not at goal)   Reviewed gentle skin care  - Continue Zyrtec or Allegra daily, increase dosage during the winter months. Increase by 1 pill every 48 hours until symptoms improve. Maximum 4 pills daily.     # Multiple benign nevi  # Solar lentigines  Counseled ABCDEs of melanoma and importance of self-skin exams  - Recommend sunscreen SPF 30+, wide-brimmed hats, UPF clothing, sun avoidance    # Cherry angiomas  # Seborrheic keratoses  Reassured benign    Procedures Performed:   None    Follow-up: 1-2 year(s) in-person for full body skin cancer screening, or earlier for new or changing lesions    Staff and Scribe:   Scribe Disclosure:  Fox DELCID PA-C serving as a scribe to document services personally performed by Mai Pruett PA-C, based on data collection and the provider's statements to me.      Provider Disclosure:   The documentation recorded by the scribe accurately reflects the services I personally performed and the decisions made by me.    All risks, benefits and alternatives were discussed with patient.  Patient is in agreement and understands the assessment and plan.  All questions were answered.  Sun Screen Education was given.   Return to Clinic in 1-2 yrs or sooner as needed.   Mai Pruett PA-C   [facility]      ____________________________________________    CC: Skin Check (1 year FBSC/Concerns: lesions on chest, back and changes on left foot middle  toe. )    HPI:  Ms. Sakshi Parish is a(n) 44 year old female who presents today as a return patient.     Last seen January 2023.  Benign skin check, monitoring lesion on left third toe.  Recommended antihistamine for mild dermatographia some. Prior to this was seen by Dr. Meeks on 2/3/2017 for skin check.     # Full body skin cancer screening   No personal history of skin cancer  Spot(s) of concern on trunk/extremities  Family members with NMSC that makes her concerned regarding her own skin    # Dermatographism  Improved with taking 1 tablet of Zyrtec or Allegra daily  Also has seasonal allergies  Does not moisturize regularly  Reports itching is worst during the winter months    Patient is otherwise feeling well, without additional skin concerns.    Labs Reviewed:  N/A    Physical Exam:  Vitals: There were no vitals taken for this visit.  SKIN: Total skin excluding the undergarment areas was performed. The exam included the head/face, neck, both arms, chest, back, abdomen, both legs, digits and/or nails.   - 0.15 cm brown uniform macule on the L 3rd toe at the proximal nail border.  - There are dome shaped bright red papules on the trunk and extremities.   - Multiple regular brown pigmented macules and papules are identified on the scalp, trunk, and extremities.   - Scattered brown macules on sun exposed areas.  - There are waxy stuck on tan to brown papules on the trunk and extremities.   - No other lesions of concern on areas examined.     Medications:  Current Outpatient Medications   Medication Sig Dispense Refill    cetirizine (ZYRTEC) 10 MG tablet Take 10 mg by mouth daily      fish oil-omega-3 fatty acids 1000 MG capsule Take 2 g by mouth daily      Flaxseed, Linseed, (FLAXSEED OIL PO)       hydrOXYzine HCl (ATARAX) 10 MG tablet Take 1-2 tablets (10-20 mg) by mouth every 6 hours as needed for anxiety. 45 tablet 2    lactobacillus rhamnosus, GG, (CULTURELL) capsule Take 1 capsule by mouth 2  times daily      Multiple Vitamins-Minerals (WOMENS MULTI VITAMIN & MINERAL PO)       VITAMIN D, CHOLECALCIFEROL, PO Take 1,000 Units by mouth daily       No current facility-administered medications for this visit.      Past Medical History:   Patient Active Problem List   Diagnosis    Goiter    Family history of malignant melanoma of skin    Family history of basal cell carcinoma    Fetal cardiac anomaly affecting pregnancy, antepartum    History of ADHD    Anxiety     Past Medical History:   Diagnosis Date    ADD (attention deficit disorder)     Family history of malignant melanoma of skin 02/03/2017    Goiter 04/27/2015    Hx of migraines     late teens/20's        CC Referred Self, MD  No address on file on close of this encounter.

## 2025-03-09 DIAGNOSIS — F41.9 ANXIETY: ICD-10-CM

## 2025-03-10 RX ORDER — HYDROXYZINE HYDROCHLORIDE 10 MG/1
10-20 TABLET, FILM COATED ORAL EVERY 6 HOURS PRN
Qty: 45 TABLET | Refills: 2 | Status: SHIPPED | OUTPATIENT
Start: 2025-03-10

## 2025-04-13 NOTE — PLAN OF CARE
[facility]. Baby transferred via parent's arms.  Action: Receiving unit notified of transfer: Yes. Patient and family notified of room change. Report given to Angeles HORNER at 1825. Belongings sent to receiving unit. Oriented patient to surroundings. Call light within reach. ID bands double-checked with receiving RN.  Response: Patient tolerated transfer and is stable.   APPTS ARE READY TO BE MADE: [ X] YES    Best Family or Patient Contact (if needed):    Additional Information about above appointments (if needed):    1: PCP  2:   3:     Other comments or requests:    APPTS ARE READY TO BE MADE: [ X] YES    Best Family or Patient Contact (if needed):    Additional Information about above appointments (if needed):    1: PCP  2:   3:     Other comments or requests:   Patient advises they do not want our assistance and prefer to coordinate the non - [facility] appointments on their own. No information was provided to the patient. APPTS ARE READY TO BE MADE: [ X] YES    Best Family or Patient Contact (if needed):    Additional Information about above appointments (if needed):    1: PCP - Dr. Shelton  2:   3:     Other comments or requests:   Patient advises they do not want our assistance and prefer to coordinate the non - [facility] appointments on their own. No information was provided to the patient.

## 2025-05-12 NOTE — PATIENT INSTRUCTIONS
"Begin Allegra or Zyrtec daily for next 3-6 months. Take 1 pill daily x 2 days. Increase by 1 pill every 48 hours until symptoms improve. Maximum 4 pills daily.   ___________________________________________________________    GENTLE SKIN CARE    SKIN CARE RECOMMENDATIONS  Creams are more moisturizing than lotions  Keep showers less than 15 min,use lukewarm water (avoid extreme HOT/COLD)  Only use soap in \"dirty\" areas\" (face, body folds, feet), do not vigorously scrub  PAT skin dry, do not rub dry  Apply moisturizing cream within 3 minutes of bathing while skin is slightly damp to lock in moisture.   If you were prescribed a topical medication, apply the medicine first and then cover with moisturizer.   Best practice to moisturize whole body at least twice daily regardless of showering     LIFESTYLE TIPS  Do not use powders, perfumes, or colognes on skin.  Diffusers can also be irritating to sensitive skin.   Avoid saunas and steam baths - too hot, will dry out skin  Avoid tight or scratchy clothing, such as wool.  Wash new clothing before wearing.  A humidifier or vaporizer can help dry skin - clean weekly to avoid mold.    Moisturizers (Emollients)    Creams preferred over lotions - typically come in tubs you need to scoop from, rather than pump    CeraVe cream  Eucerin cream  Cetaphil cream  Vanicream  La Roche-Posay  Elta MD    Hydrating ointments    Vaseline  Aquaphor   Mild Cleansers / Body Washes    Look for \"fragrance free\"     CeraVe Hydrating Body Wash or Bar  Cetaphil Ultra Gentle Body Wash or Bar  Cetaphil Soothing Wash  Vanicream Gentle Body Wash or Bar  Dove Sensitive Body Wash or Bar  Dove Ultra-Sensitive Body Wash or Bar  Aveeno Fragrance-Free Body Wash     Laundry Products    Avoid fabric softeners & dryer sheets - can be irritating    Look for products labeled   \"Free & Clear\" or Free & Gentle\" - these are fragrance free    Cheer Free & Gentle detergent  Tide Free & Gentle detergent  All Free & " "Clear detergent  Target Brand Free & Clear detergent  Arm & Hammer Free & Clear detergent   Sunscreens    Recommend SPF 30 or greater     Suncreen containing zinc oxide or titanium dioxide are best in patients with sensitive skin. These minerals are \"physical\" blockers of UV.     Eucerin Sensitive Mineral Sunscreen - tinted or non-tinted  CeraVe Hydrating Mineral Sunscreen  La Roche-Posay Tinted Mineral Anthelios Sunscreen  Elta MD UV Clear Tinted 46 SPF  Neutrogena Mineral UV-Tint    Other sunscreens (including spray formulations) use chemicals to block UV and can irritate sensitive skin and clog pores.       Oils    Mineral oil   Coconut oil (raw, unrefined, organic)   Foster butter   Colloidal oatmeal   Tea tree oil   FYI    Generic products are okay substitute, but make sure they are fragrance-free.    Organic does not mean fragrance-free and can be irritating - reading the ingredients list is very important.     ___________________________________________________________      " s/p fall

## 2025-05-25 DIAGNOSIS — F41.9 ANXIETY: ICD-10-CM

## 2025-05-27 RX ORDER — HYDROXYZINE HYDROCHLORIDE 10 MG/1
10-20 TABLET, FILM COATED ORAL EVERY 6 HOURS PRN
Qty: 45 TABLET | Refills: 2 | Status: SHIPPED | OUTPATIENT
Start: 2025-05-27

## 2025-08-03 DIAGNOSIS — F41.9 ANXIETY: ICD-10-CM

## 2025-08-04 RX ORDER — HYDROXYZINE HYDROCHLORIDE 10 MG/1
10-20 TABLET, FILM COATED ORAL EVERY 6 HOURS PRN
Qty: 45 TABLET | Refills: 2 | Status: SHIPPED | OUTPATIENT
Start: 2025-08-04